# Patient Record
Sex: MALE | Race: WHITE | NOT HISPANIC OR LATINO | URBAN - METROPOLITAN AREA
[De-identification: names, ages, dates, MRNs, and addresses within clinical notes are randomized per-mention and may not be internally consistent; named-entity substitution may affect disease eponyms.]

---

## 2018-09-30 ENCOUNTER — EMERGENCY (EMERGENCY)
Facility: HOSPITAL | Age: 73
LOS: 1 days | Discharge: HOME | End: 2018-09-30
Attending: EMERGENCY MEDICINE
Payer: OTHER GOVERNMENT

## 2018-09-30 VITALS
HEIGHT: 67 IN | HEART RATE: 92 BPM | RESPIRATION RATE: 22 BRPM | WEIGHT: 274.92 LBS | TEMPERATURE: 98 F | DIASTOLIC BLOOD PRESSURE: 88 MMHG | OXYGEN SATURATION: 92 % | SYSTOLIC BLOOD PRESSURE: 156 MMHG

## 2018-09-30 VITALS
RESPIRATION RATE: 20 BRPM | DIASTOLIC BLOOD PRESSURE: 79 MMHG | HEART RATE: 102 BPM | SYSTOLIC BLOOD PRESSURE: 180 MMHG | OXYGEN SATURATION: 91 % | TEMPERATURE: 98 F

## 2018-09-30 LAB
ALBUMIN SERPL ELPH-MCNC: 3.8 G/DL — SIGNIFICANT CHANGE UP (ref 3.3–5.2)
ALP SERPL-CCNC: 46 U/L — SIGNIFICANT CHANGE UP (ref 40–120)
ALT FLD-CCNC: 17 U/L — SIGNIFICANT CHANGE UP
AMPHET UR-MCNC: NEGATIVE — SIGNIFICANT CHANGE UP
ANION GAP SERPL CALC-SCNC: 22 MMOL/L — HIGH (ref 5–17)
APPEARANCE UR: CLEAR — SIGNIFICANT CHANGE UP
AST SERPL-CCNC: 18 U/L — SIGNIFICANT CHANGE UP
BACTERIA # UR AUTO: ABNORMAL
BARBITURATES UR SCN-MCNC: NEGATIVE — SIGNIFICANT CHANGE UP
BENZODIAZ UR-MCNC: NEGATIVE — SIGNIFICANT CHANGE UP
BILIRUB SERPL-MCNC: 0.2 MG/DL — LOW (ref 0.4–2)
BILIRUB UR-MCNC: NEGATIVE — SIGNIFICANT CHANGE UP
BUN SERPL-MCNC: 19 MG/DL — SIGNIFICANT CHANGE UP (ref 8–20)
CALCIUM SERPL-MCNC: 9.6 MG/DL — SIGNIFICANT CHANGE UP (ref 8.6–10.2)
CHLORIDE SERPL-SCNC: 102 MMOL/L — SIGNIFICANT CHANGE UP (ref 98–107)
CK MB CFR SERPL CALC: 3.1 NG/ML — SIGNIFICANT CHANGE UP (ref 0–6.7)
CK SERPL-CCNC: 152 U/L — SIGNIFICANT CHANGE UP (ref 30–200)
CO2 SERPL-SCNC: 17 MMOL/L — LOW (ref 22–29)
COCAINE METAB.OTHER UR-MCNC: NEGATIVE — SIGNIFICANT CHANGE UP
COLOR SPEC: YELLOW — SIGNIFICANT CHANGE UP
CREAT SERPL-MCNC: 1.3 MG/DL — SIGNIFICANT CHANGE UP (ref 0.5–1.3)
DIFF PNL FLD: ABNORMAL
EPI CELLS # UR: SIGNIFICANT CHANGE UP
ETHANOL SERPL-MCNC: <10 MG/DL — SIGNIFICANT CHANGE UP
GLUCOSE SERPL-MCNC: 327 MG/DL — HIGH (ref 70–115)
GLUCOSE UR QL: 1000 MG/DL
HCT VFR BLD CALC: 40.8 % — LOW (ref 42–52)
HGB BLD-MCNC: 13.1 G/DL — LOW (ref 14–18)
HYALINE CASTS # UR AUTO: ABNORMAL /LPF
KETONES UR-MCNC: ABNORMAL
LEUKOCYTE ESTERASE UR-ACNC: NEGATIVE — SIGNIFICANT CHANGE UP
MCHC RBC-ENTMCNC: 30.8 PG — SIGNIFICANT CHANGE UP (ref 27–31)
MCHC RBC-ENTMCNC: 32.1 G/DL — SIGNIFICANT CHANGE UP (ref 32–36)
MCV RBC AUTO: 95.8 FL — HIGH (ref 80–94)
METHADONE UR-MCNC: NEGATIVE — SIGNIFICANT CHANGE UP
NITRITE UR-MCNC: NEGATIVE — SIGNIFICANT CHANGE UP
OPIATES UR-MCNC: NEGATIVE — SIGNIFICANT CHANGE UP
PCP SPEC-MCNC: SIGNIFICANT CHANGE UP
PCP UR-MCNC: NEGATIVE — SIGNIFICANT CHANGE UP
PH UR: 5 — SIGNIFICANT CHANGE UP (ref 5–8)
PLATELET # BLD AUTO: 212 K/UL — SIGNIFICANT CHANGE UP (ref 150–400)
POTASSIUM SERPL-MCNC: 4 MMOL/L — SIGNIFICANT CHANGE UP (ref 3.5–5.3)
POTASSIUM SERPL-SCNC: 4 MMOL/L — SIGNIFICANT CHANGE UP (ref 3.5–5.3)
PROT SERPL-MCNC: 7 G/DL — SIGNIFICANT CHANGE UP (ref 6.6–8.7)
PROT UR-MCNC: 100 MG/DL
RBC # BLD: 4.26 M/UL — LOW (ref 4.6–6.2)
RBC # FLD: 14.9 % — SIGNIFICANT CHANGE UP (ref 11–15.6)
RBC CASTS # UR COMP ASSIST: SIGNIFICANT CHANGE UP /HPF (ref 0–4)
SODIUM SERPL-SCNC: 141 MMOL/L — SIGNIFICANT CHANGE UP (ref 135–145)
SP GR SPEC: 1.02 — SIGNIFICANT CHANGE UP (ref 1.01–1.02)
THC UR QL: NEGATIVE — SIGNIFICANT CHANGE UP
TROPONIN T SERPL-MCNC: <0.01 NG/ML — SIGNIFICANT CHANGE UP (ref 0–0.06)
UROBILINOGEN FLD QL: NEGATIVE MG/DL — SIGNIFICANT CHANGE UP
WBC # BLD: 8.8 K/UL — SIGNIFICANT CHANGE UP (ref 4.8–10.8)
WBC # FLD AUTO: 8.8 K/UL — SIGNIFICANT CHANGE UP (ref 4.8–10.8)
WBC UR QL: SIGNIFICANT CHANGE UP

## 2018-09-30 PROCEDURE — 99284 EMERGENCY DEPT VISIT MOD MDM: CPT | Mod: 25

## 2018-09-30 PROCEDURE — 82553 CREATINE MB FRACTION: CPT

## 2018-09-30 PROCEDURE — 72131 CT LUMBAR SPINE W/O DYE: CPT | Mod: 26

## 2018-09-30 PROCEDURE — 80307 DRUG TEST PRSMV CHEM ANLYZR: CPT

## 2018-09-30 PROCEDURE — 81001 URINALYSIS AUTO W/SCOPE: CPT

## 2018-09-30 PROCEDURE — 80053 COMPREHEN METABOLIC PANEL: CPT

## 2018-09-30 PROCEDURE — 93005 ELECTROCARDIOGRAM TRACING: CPT

## 2018-09-30 PROCEDURE — 72131 CT LUMBAR SPINE W/O DYE: CPT

## 2018-09-30 PROCEDURE — 85027 COMPLETE CBC AUTOMATED: CPT

## 2018-09-30 PROCEDURE — 93010 ELECTROCARDIOGRAM REPORT: CPT

## 2018-09-30 PROCEDURE — 99053 MED SERV 10PM-8AM 24 HR FAC: CPT

## 2018-09-30 PROCEDURE — 36415 COLL VENOUS BLD VENIPUNCTURE: CPT

## 2018-09-30 PROCEDURE — 84484 ASSAY OF TROPONIN QUANT: CPT

## 2018-09-30 PROCEDURE — 82550 ASSAY OF CK (CPK): CPT

## 2018-09-30 NOTE — ED ADULT NURSE NOTE - OBJECTIVE STATEMENT
Pt states "I was on the toilet taking a leak when I slid off, I didn't not fall off, and I couldn't get back up because my legs felt weak", pt AOx4, denies hitting head or LOC, denies n/v, hx of DM, no obvious deformity/injury noted at this time, denies needing to use assistive devices at home, states "Edith been lazy lately and not doing as much, my daughter goes food shopping for me now, I just had a physical through the [facility] and they told me my blood work was good"

## 2018-09-30 NOTE — ED PROVIDER NOTE - OBJECTIVE STATEMENT
74 y/o M with hx of DM, HTN presents to the ED c/o weakness to lower extremities. Pt states he was in bathroom, as he went to stand up from toilet bowl his feet slipped and landed onto his lower back. Pt had difficulty standing back up as feet constantly slipped from underneath him. Pt  required assistance to stand up. PO intake normal today, medications taken. Pt states he consumed EtOH drinks today, no abnormal amount. Denies fevers/chills, ha, loc, focal neuro deficits, cp/sob/palp, cough, abd pain/n/v/d, urinary symptoms, recent travel and sick contacts. Pt lives alone.

## 2018-09-30 NOTE — ED ADULT NURSE NOTE - NSIMPLEMENTINTERV_GEN_ALL_ED
Implemented All Fall Risk Interventions:  [place] to call system. Call bell, personal items and telephone within reach. Instruct patient to call for assistance. Room bathroom lighting operational. Non-slip footwear when patient is off stretcher. Physically safe environment: no spills, clutter or unnecessary equipment. Stretcher in lowest position, wheels locked, appropriate side rails in place. Provide visual cue, wrist band, yellow gown, etc. Monitor gait and stability. Monitor for mental status changes and reorient to person, place, and time. Review medications for side effects contributing to fall risk. Reinforce activity limits and safety measures with patient and family.

## 2018-09-30 NOTE — ED PROVIDER NOTE - PHYSICAL EXAMINATION
Constitutional: Appears comfortably, talking in full sentences  Head: NC/AT, no swelling  Eyes: EOMI, no swelling  Mouth: mm moist  Neck: supple, trachea is midline  Chest: Bilateral air entry, symmetrical chest expansion, no distress  Heart: S1 S2 distant  Abdomen: abd soft, non tender  Musc/Skel: extremities with no swelling, no deformity, no spine tenderness, distal pulses present  Neuro: A&Ox3, no focal deficits sitting comfortably, talking in full sentences  pupilsa reactive, eomi,   mm moist, no oral injury, no facial pain  supple, no c/t/l spine tenderness, from, trachea in midline  Markie chest expansion, no cheat wall tenderness, no rib tenderness, no deformity, no clavicular pain  S1 S2 distant  abd soft, obese, non tender, no g/r,   no pelvic pain  moves all ext, trace edema b/l lower extremities  Neuro aao3, cn intact grossly, no focal deficits  skin no bruises, no swelling

## 2018-09-30 NOTE — ED ADULT TRIAGE NOTE - CHIEF COMPLAINT QUOTE
Pt c/o lower extremity weakness.  Pt states he was using the bathroom when he slipped off the toilet bowl on a wet floor.  Pt states he exhausted himself trying to get off the floor.  Denies cp.  Reports cardiac hx and DM.  No acute distress noted.

## 2018-09-30 NOTE — ED PROVIDER NOTE - NS ED ROS FT
no weight change, no fever or chills  no recent travel, no recent abox, no sick contacts  no recent change in medications  no rash, no bruises  no visual changes no eye discharge  no cough cold or congestion,   no sob, no chest pain  no orthopnea, no pnd  no abd pain, no n/v/d  no hematuria, no change in urinary habits  no joint pain, no deformity  no headache, no paresthesia +weakness to legs

## 2018-09-30 NOTE — ED PROVIDER NOTE - CONDUCTED A DETAILED DISCUSSION WITH PATIENT AND/OR GUARDIAN REGARDING, MDM
radiology results/lab results need for outpatient follow-up/return to ED if symptoms worsen, persist or questions arise/radiology results/lab results

## 2019-10-18 NOTE — ED ADULT NURSE REASSESSMENT NOTE - NS ED NURSE REASSESS COMMENT FT1
Pt able to ambulate safely and steadily w/out assistance, denies dizziness/weakness upon standing, IV removed, pt d/c home w/ family, ambulated to ED exit w/ assistance of wheelchair.
Pt ambulated well w/ use of wheelchair to restroom, pt tolerated well.
No

## 2020-01-01 ENCOUNTER — INPATIENT (INPATIENT)
Facility: HOSPITAL | Age: 75
LOS: 36 days | DRG: 207 | End: 2020-05-09
Attending: SURGERY | Admitting: FAMILY MEDICINE
Payer: MEDICARE

## 2020-01-01 VITALS
SYSTOLIC BLOOD PRESSURE: 135 MMHG | OXYGEN SATURATION: 85 % | HEIGHT: 66 IN | WEIGHT: 289.91 LBS | DIASTOLIC BLOOD PRESSURE: 84 MMHG | RESPIRATION RATE: 22 BRPM | TEMPERATURE: 100 F | HEART RATE: 65 BPM

## 2020-01-01 VITALS — TEMPERATURE: 99 F

## 2020-01-01 DIAGNOSIS — E87.1 HYPO-OSMOLALITY AND HYPONATREMIA: ICD-10-CM

## 2020-01-01 DIAGNOSIS — R55 SYNCOPE AND COLLAPSE: ICD-10-CM

## 2020-01-01 DIAGNOSIS — E11.65 TYPE 2 DIABETES MELLITUS WITH HYPERGLYCEMIA: ICD-10-CM

## 2020-01-01 DIAGNOSIS — N17.9 ACUTE KIDNEY FAILURE, UNSPECIFIED: ICD-10-CM

## 2020-01-01 DIAGNOSIS — B34.2 CORONAVIRUS INFECTION, UNSPECIFIED: ICD-10-CM

## 2020-01-01 DIAGNOSIS — I10 ESSENTIAL (PRIMARY) HYPERTENSION: ICD-10-CM

## 2020-01-01 DIAGNOSIS — E11.9 TYPE 2 DIABETES MELLITUS WITHOUT COMPLICATIONS: ICD-10-CM

## 2020-01-01 DIAGNOSIS — Z71.89 OTHER SPECIFIED COUNSELING: ICD-10-CM

## 2020-01-01 DIAGNOSIS — Z29.9 ENCOUNTER FOR PROPHYLACTIC MEASURES, UNSPECIFIED: ICD-10-CM

## 2020-01-01 DIAGNOSIS — R09.02 HYPOXEMIA: ICD-10-CM

## 2020-01-01 LAB
-  AMIKACIN: SIGNIFICANT CHANGE UP
-  AMIKACIN: SIGNIFICANT CHANGE UP
-  AMOXICILLIN/CLAVULANIC ACID: SIGNIFICANT CHANGE UP
-  AMOXICILLIN/CLAVULANIC ACID: SIGNIFICANT CHANGE UP
-  AMPICILLIN/SULBACTAM: SIGNIFICANT CHANGE UP
-  AMPICILLIN: SIGNIFICANT CHANGE UP
-  AMPICILLIN: SIGNIFICANT CHANGE UP
-  AZTREONAM: SIGNIFICANT CHANGE UP
-  AZTREONAM: SIGNIFICANT CHANGE UP
-  CANDIDA TROPICALIS: SIGNIFICANT CHANGE UP
-  CEFAZOLIN: SIGNIFICANT CHANGE UP
-  CEFEPIME: SIGNIFICANT CHANGE UP
-  CEFEPIME: SIGNIFICANT CHANGE UP
-  CEFOXITIN: SIGNIFICANT CHANGE UP
-  CEFOXITIN: SIGNIFICANT CHANGE UP
-  CEFTRIAXONE: SIGNIFICANT CHANGE UP
-  CEFTRIAXONE: SIGNIFICANT CHANGE UP
-  CIPROFLOXACIN: SIGNIFICANT CHANGE UP
-  CIPROFLOXACIN: SIGNIFICANT CHANGE UP
-  CLINDAMYCIN: SIGNIFICANT CHANGE UP
-  CLINDAMYCIN: SIGNIFICANT CHANGE UP
-  ERTAPENEM: SIGNIFICANT CHANGE UP
-  ERTAPENEM: SIGNIFICANT CHANGE UP
-  ERYTHROMYCIN: SIGNIFICANT CHANGE UP
-  ERYTHROMYCIN: SIGNIFICANT CHANGE UP
-  GENTAMICIN: SIGNIFICANT CHANGE UP
-  IMIPENEM: SIGNIFICANT CHANGE UP
-  IMIPENEM: SIGNIFICANT CHANGE UP
-  LEVOFLOXACIN: SIGNIFICANT CHANGE UP
-  LEVOFLOXACIN: SIGNIFICANT CHANGE UP
-  LINEZOLID: SIGNIFICANT CHANGE UP
-  LINEZOLID: SIGNIFICANT CHANGE UP
-  MEROPENEM: SIGNIFICANT CHANGE UP
-  MEROPENEM: SIGNIFICANT CHANGE UP
-  OXACILLIN: SIGNIFICANT CHANGE UP
-  OXACILLIN: SIGNIFICANT CHANGE UP
-  PENICILLIN: SIGNIFICANT CHANGE UP
-  PENICILLIN: SIGNIFICANT CHANGE UP
-  PIPERACILLIN/TAZOBACTAM: SIGNIFICANT CHANGE UP
-  PIPERACILLIN/TAZOBACTAM: SIGNIFICANT CHANGE UP
-  RIFAMPIN: SIGNIFICANT CHANGE UP
-  RIFAMPIN: SIGNIFICANT CHANGE UP
-  TETRACYCLINE: SIGNIFICANT CHANGE UP
-  TETRACYCLINE: SIGNIFICANT CHANGE UP
-  TOBRAMYCIN: SIGNIFICANT CHANGE UP
-  TOBRAMYCIN: SIGNIFICANT CHANGE UP
-  TRIMETHOPRIM/SULFAMETHOXAZOLE: SIGNIFICANT CHANGE UP
-  VANCOMYCIN: SIGNIFICANT CHANGE UP
-  VANCOMYCIN: SIGNIFICANT CHANGE UP
ABO RH CONFIRMATION: SIGNIFICANT CHANGE UP
ALBUMIN SERPL ELPH-MCNC: 1.9 G/DL — LOW (ref 3.3–5.2)
ALBUMIN SERPL ELPH-MCNC: 2 G/DL — LOW (ref 3.3–5.2)
ALBUMIN SERPL ELPH-MCNC: 2 G/DL — LOW (ref 3.3–5.2)
ALBUMIN SERPL ELPH-MCNC: 2.1 G/DL — LOW (ref 3.3–5.2)
ALBUMIN SERPL ELPH-MCNC: 2.3 G/DL — LOW (ref 3.3–5.2)
ALBUMIN SERPL ELPH-MCNC: 2.3 G/DL — LOW (ref 3.3–5.2)
ALBUMIN SERPL ELPH-MCNC: 2.5 G/DL — LOW (ref 3.3–5.2)
ALBUMIN SERPL ELPH-MCNC: 2.7 G/DL — LOW (ref 3.3–5.2)
ALBUMIN SERPL ELPH-MCNC: 2.7 G/DL — LOW (ref 3.3–5.2)
ALBUMIN SERPL ELPH-MCNC: 2.8 G/DL — LOW (ref 3.3–5.2)
ALBUMIN SERPL ELPH-MCNC: 3.1 G/DL — LOW (ref 3.3–5.2)
ALBUMIN SERPL ELPH-MCNC: 3.6 G/DL — SIGNIFICANT CHANGE UP (ref 3.3–5.2)
ALP SERPL-CCNC: 35 U/L — LOW (ref 40–120)
ALP SERPL-CCNC: 37 U/L — LOW (ref 40–120)
ALP SERPL-CCNC: 42 U/L — SIGNIFICANT CHANGE UP (ref 40–120)
ALP SERPL-CCNC: 43 U/L — SIGNIFICANT CHANGE UP (ref 40–120)
ALP SERPL-CCNC: 44 U/L — SIGNIFICANT CHANGE UP (ref 40–120)
ALP SERPL-CCNC: 45 U/L — SIGNIFICANT CHANGE UP (ref 40–120)
ALP SERPL-CCNC: 48 U/L — SIGNIFICANT CHANGE UP (ref 40–120)
ALP SERPL-CCNC: 51 U/L — SIGNIFICANT CHANGE UP (ref 40–120)
ALP SERPL-CCNC: 52 U/L — SIGNIFICANT CHANGE UP (ref 40–120)
ALP SERPL-CCNC: 53 U/L — SIGNIFICANT CHANGE UP (ref 40–120)
ALP SERPL-CCNC: 54 U/L — SIGNIFICANT CHANGE UP (ref 40–120)
ALP SERPL-CCNC: 55 U/L — SIGNIFICANT CHANGE UP (ref 40–120)
ALP SERPL-CCNC: 60 U/L — SIGNIFICANT CHANGE UP (ref 40–120)
ALP SERPL-CCNC: 62 U/L — SIGNIFICANT CHANGE UP (ref 40–120)
ALT FLD-CCNC: 18 U/L — SIGNIFICANT CHANGE UP
ALT FLD-CCNC: 19 U/L — SIGNIFICANT CHANGE UP
ALT FLD-CCNC: 19 U/L — SIGNIFICANT CHANGE UP
ALT FLD-CCNC: 21 U/L — SIGNIFICANT CHANGE UP
ALT FLD-CCNC: 23 U/L — SIGNIFICANT CHANGE UP
ALT FLD-CCNC: 24 U/L — SIGNIFICANT CHANGE UP
ALT FLD-CCNC: 27 U/L — SIGNIFICANT CHANGE UP
ALT FLD-CCNC: 30 U/L — SIGNIFICANT CHANGE UP
ALT FLD-CCNC: 30 U/L — SIGNIFICANT CHANGE UP
ALT FLD-CCNC: 32 U/L — SIGNIFICANT CHANGE UP
ALT FLD-CCNC: 33 U/L — SIGNIFICANT CHANGE UP
ALT FLD-CCNC: 40 U/L — SIGNIFICANT CHANGE UP
ALT FLD-CCNC: 48 U/L — HIGH
ALT FLD-CCNC: 53 U/L — HIGH
ANION GAP SERPL CALC-SCNC: 10 MMOL/L — SIGNIFICANT CHANGE UP (ref 5–17)
ANION GAP SERPL CALC-SCNC: 11 MMOL/L — SIGNIFICANT CHANGE UP (ref 5–17)
ANION GAP SERPL CALC-SCNC: 12 MMOL/L — SIGNIFICANT CHANGE UP (ref 5–17)
ANION GAP SERPL CALC-SCNC: 13 MMOL/L — SIGNIFICANT CHANGE UP (ref 5–17)
ANION GAP SERPL CALC-SCNC: 14 MMOL/L — SIGNIFICANT CHANGE UP (ref 5–17)
ANION GAP SERPL CALC-SCNC: 15 MMOL/L — SIGNIFICANT CHANGE UP (ref 5–17)
ANION GAP SERPL CALC-SCNC: 16 MMOL/L — SIGNIFICANT CHANGE UP (ref 5–17)
ANION GAP SERPL CALC-SCNC: 18 MMOL/L — HIGH (ref 5–17)
ANION GAP SERPL CALC-SCNC: 9 MMOL/L — SIGNIFICANT CHANGE UP (ref 5–17)
ANISOCYTOSIS BLD QL: SLIGHT — SIGNIFICANT CHANGE UP
APPEARANCE UR: CLEAR — SIGNIFICANT CHANGE UP
APTT BLD: 109.5 SEC — HIGH (ref 27.5–36.3)
APTT BLD: 111.6 SEC — HIGH (ref 27.5–36.3)
APTT BLD: 122.5 SEC — CRITICAL HIGH (ref 27.5–36.3)
APTT BLD: 123.7 SEC — CRITICAL HIGH (ref 27.5–36.3)
APTT BLD: 124.1 SEC — CRITICAL HIGH (ref 27.5–36.3)
APTT BLD: 25.4 SEC — LOW (ref 27.5–36.3)
APTT BLD: 26.4 SEC — LOW (ref 27.5–36.3)
APTT BLD: 27.4 SEC — LOW (ref 27.5–36.3)
APTT BLD: 38.5 SEC — HIGH (ref 27.5–36.3)
APTT BLD: 48.8 SEC — HIGH (ref 27.5–36.3)
APTT BLD: 52.1 SEC — HIGH (ref 27.5–36.3)
APTT BLD: 54.3 SEC — HIGH (ref 27.5–36.3)
APTT BLD: 56.1 SEC — HIGH (ref 27.5–36.3)
APTT BLD: 56.3 SEC — HIGH (ref 27.5–36.3)
APTT BLD: 57.8 SEC — HIGH (ref 27.5–36.3)
APTT BLD: 57.8 SEC — HIGH (ref 27.5–36.3)
APTT BLD: 61 SEC — HIGH (ref 27.5–36.3)
APTT BLD: 66 SEC — HIGH (ref 27.5–36.3)
APTT BLD: 66.8 SEC — HIGH (ref 27.5–36.3)
APTT BLD: 68.2 SEC — HIGH (ref 27.5–36.3)
APTT BLD: 68.3 SEC — HIGH (ref 27.5–36.3)
APTT BLD: 68.8 SEC — HIGH (ref 27.5–36.3)
APTT BLD: 68.9 SEC — HIGH (ref 27.5–36.3)
APTT BLD: 69.9 SEC — HIGH (ref 27.5–36.3)
APTT BLD: 70.2 SEC — HIGH (ref 27.5–36.3)
APTT BLD: 74.8 SEC — HIGH (ref 27.5–36.3)
APTT BLD: 75 SEC — HIGH (ref 27.5–36.3)
APTT BLD: 75.3 SEC — HIGH (ref 27.5–36.3)
APTT BLD: 79.3 SEC — HIGH (ref 27.5–36.3)
APTT BLD: 80 SEC — HIGH (ref 27.5–36.3)
APTT BLD: 81.9 SEC — HIGH (ref 27.5–36.3)
APTT BLD: 83.5 SEC — HIGH (ref 27.5–36.3)
APTT BLD: 83.5 SEC — HIGH (ref 27.5–36.3)
APTT BLD: 83.7 SEC — HIGH (ref 27.5–36.3)
APTT BLD: 85.4 SEC — HIGH (ref 27.5–36.3)
APTT BLD: 85.5 SEC — HIGH (ref 27.5–36.3)
APTT BLD: 86.7 SEC — HIGH (ref 27.5–36.3)
APTT BLD: 88.2 SEC — HIGH (ref 27.5–36.3)
APTT BLD: 88.4 SEC — HIGH (ref 27.5–36.3)
APTT BLD: 91.2 SEC — HIGH (ref 27.5–36.3)
APTT BLD: 96.4 SEC — HIGH (ref 27.5–36.3)
APTT BLD: 97.2 SEC — HIGH (ref 27.5–36.3)
APTT BLD: 97.8 SEC — HIGH (ref 27.5–36.3)
APTT BLD: 98.4 SEC — HIGH (ref 27.5–36.3)
APTT BLD: 99.4 SEC — HIGH (ref 27.5–36.3)
APTT BLD: >200 SEC — CRITICAL HIGH (ref 27.5–36.3)
AST SERPL-CCNC: 142 U/L — HIGH
AST SERPL-CCNC: 16 U/L — SIGNIFICANT CHANGE UP
AST SERPL-CCNC: 19 U/L — SIGNIFICANT CHANGE UP
AST SERPL-CCNC: 21 U/L — SIGNIFICANT CHANGE UP
AST SERPL-CCNC: 22 U/L — SIGNIFICANT CHANGE UP
AST SERPL-CCNC: 22 U/L — SIGNIFICANT CHANGE UP
AST SERPL-CCNC: 23 U/L — SIGNIFICANT CHANGE UP
AST SERPL-CCNC: 23 U/L — SIGNIFICANT CHANGE UP
AST SERPL-CCNC: 29 U/L — SIGNIFICANT CHANGE UP
AST SERPL-CCNC: 32 U/L — SIGNIFICANT CHANGE UP
AST SERPL-CCNC: 32 U/L — SIGNIFICANT CHANGE UP
AST SERPL-CCNC: 36 U/L — SIGNIFICANT CHANGE UP
AST SERPL-CCNC: 55 U/L — HIGH
AST SERPL-CCNC: 62 U/L — HIGH
BACTERIA # UR AUTO: ABNORMAL
BASE EXCESS BLDA CALC-SCNC: -0.6 MMOL/L — SIGNIFICANT CHANGE UP (ref -3–3)
BASE EXCESS BLDA CALC-SCNC: -0.9 MMOL/L — SIGNIFICANT CHANGE UP (ref -3–3)
BASE EXCESS BLDA CALC-SCNC: 6.1 MMOL/L — HIGH (ref -2–2)
BASE EXCESS BLDA CALC-SCNC: 7.1 MMOL/L — HIGH (ref -2–2)
BASO STIPL BLD QL SMEAR: PRESENT — SIGNIFICANT CHANGE UP
BASOPHILS # BLD AUTO: 0 K/UL — SIGNIFICANT CHANGE UP (ref 0–0.2)
BASOPHILS # BLD AUTO: 0.01 K/UL — SIGNIFICANT CHANGE UP (ref 0–0.2)
BASOPHILS # BLD AUTO: 0.02 K/UL — SIGNIFICANT CHANGE UP (ref 0–0.2)
BASOPHILS # BLD AUTO: 0.03 K/UL — SIGNIFICANT CHANGE UP (ref 0–0.2)
BASOPHILS # BLD AUTO: 0.04 K/UL — SIGNIFICANT CHANGE UP (ref 0–0.2)
BASOPHILS # BLD AUTO: 0.05 K/UL — SIGNIFICANT CHANGE UP (ref 0–0.2)
BASOPHILS # BLD AUTO: 0.06 K/UL — SIGNIFICANT CHANGE UP (ref 0–0.2)
BASOPHILS NFR BLD AUTO: 0 % — SIGNIFICANT CHANGE UP (ref 0–2)
BASOPHILS NFR BLD AUTO: 0.1 % — SIGNIFICANT CHANGE UP (ref 0–2)
BASOPHILS NFR BLD AUTO: 0.2 % — SIGNIFICANT CHANGE UP (ref 0–2)
BASOPHILS NFR BLD AUTO: 0.3 % — SIGNIFICANT CHANGE UP (ref 0–2)
BASOPHILS NFR BLD AUTO: 0.4 % — SIGNIFICANT CHANGE UP (ref 0–2)
BASOPHILS NFR BLD AUTO: 0.5 % — SIGNIFICANT CHANGE UP (ref 0–2)
BASOPHILS NFR BLD AUTO: 0.6 % — SIGNIFICANT CHANGE UP (ref 0–2)
BASOPHILS NFR BLD AUTO: 0.6 % — SIGNIFICANT CHANGE UP (ref 0–2)
BASOPHILS NFR BLD AUTO: 0.7 % — SIGNIFICANT CHANGE UP (ref 0–2)
BASOPHILS NFR BLD AUTO: 0.9 % — SIGNIFICANT CHANGE UP (ref 0–2)
BASOPHILS NFR BLD AUTO: 1.6 % — SIGNIFICANT CHANGE UP (ref 0–2)
BILIRUB SERPL-MCNC: 0.3 MG/DL — LOW (ref 0.4–2)
BILIRUB SERPL-MCNC: 0.4 MG/DL — SIGNIFICANT CHANGE UP (ref 0.4–2)
BILIRUB SERPL-MCNC: 0.4 MG/DL — SIGNIFICANT CHANGE UP (ref 0.4–2)
BILIRUB SERPL-MCNC: 0.6 MG/DL — SIGNIFICANT CHANGE UP (ref 0.4–2)
BILIRUB SERPL-MCNC: <0.2 MG/DL — LOW (ref 0.4–2)
BILIRUB SERPL-MCNC: <0.2 MG/DL — LOW (ref 0.4–2)
BILIRUB UR-MCNC: NEGATIVE — SIGNIFICANT CHANGE UP
BLD GP AB SCN SERPL QL: SIGNIFICANT CHANGE UP
BLD GP AB SCN SERPL QL: SIGNIFICANT CHANGE UP
BLOOD GAS COMMENTS ARTERIAL: SIGNIFICANT CHANGE UP
BUN SERPL-MCNC: 21 MG/DL — HIGH (ref 8–20)
BUN SERPL-MCNC: 22 MG/DL — HIGH (ref 8–20)
BUN SERPL-MCNC: 25 MG/DL — HIGH (ref 8–20)
BUN SERPL-MCNC: 26 MG/DL — HIGH (ref 8–20)
BUN SERPL-MCNC: 29 MG/DL — HIGH (ref 8–20)
BUN SERPL-MCNC: 29 MG/DL — HIGH (ref 8–20)
BUN SERPL-MCNC: 31 MG/DL — HIGH (ref 8–20)
BUN SERPL-MCNC: 33 MG/DL — HIGH (ref 8–20)
BUN SERPL-MCNC: 33 MG/DL — HIGH (ref 8–20)
BUN SERPL-MCNC: 35 MG/DL — HIGH (ref 8–20)
BUN SERPL-MCNC: 35 MG/DL — HIGH (ref 8–20)
BUN SERPL-MCNC: 36 MG/DL — HIGH (ref 8–20)
BUN SERPL-MCNC: 37 MG/DL — HIGH (ref 8–20)
BUN SERPL-MCNC: 38 MG/DL — HIGH (ref 8–20)
BUN SERPL-MCNC: 40 MG/DL — HIGH (ref 8–20)
BUN SERPL-MCNC: 41 MG/DL — HIGH (ref 8–20)
BUN SERPL-MCNC: 43 MG/DL — HIGH (ref 8–20)
BUN SERPL-MCNC: 43 MG/DL — HIGH (ref 8–20)
BUN SERPL-MCNC: 44 MG/DL — HIGH (ref 8–20)
BUN SERPL-MCNC: 44 MG/DL — HIGH (ref 8–20)
BUN SERPL-MCNC: 45 MG/DL — HIGH (ref 8–20)
BUN SERPL-MCNC: 45 MG/DL — HIGH (ref 8–20)
BUN SERPL-MCNC: 49 MG/DL — HIGH (ref 8–20)
BUN SERPL-MCNC: 50 MG/DL — HIGH (ref 8–20)
BUN SERPL-MCNC: 50 MG/DL — HIGH (ref 8–20)
BUN SERPL-MCNC: 52 MG/DL — HIGH (ref 8–20)
BUN SERPL-MCNC: 53 MG/DL — HIGH (ref 8–20)
BUN SERPL-MCNC: 53 MG/DL — HIGH (ref 8–20)
BUN SERPL-MCNC: 55 MG/DL — HIGH (ref 8–20)
BUN SERPL-MCNC: 57 MG/DL — HIGH (ref 8–20)
BUN SERPL-MCNC: 58 MG/DL — HIGH (ref 8–20)
BUN SERPL-MCNC: 59 MG/DL — HIGH (ref 8–20)
BUN SERPL-MCNC: 60 MG/DL — HIGH (ref 8–20)
BUN SERPL-MCNC: 60 MG/DL — HIGH (ref 8–20)
BUN SERPL-MCNC: 64 MG/DL — HIGH (ref 8–20)
BUN SERPL-MCNC: 65 MG/DL — HIGH (ref 8–20)
BUN SERPL-MCNC: 73 MG/DL — HIGH (ref 8–20)
BUN SERPL-MCNC: 73 MG/DL — HIGH (ref 8–20)
BUN SERPL-MCNC: 79 MG/DL — HIGH (ref 8–20)
BUN SERPL-MCNC: 80 MG/DL — HIGH (ref 8–20)
BUN SERPL-MCNC: 80 MG/DL — HIGH (ref 8–20)
BUN SERPL-MCNC: 85 MG/DL — HIGH (ref 8–20)
BUN SERPL-MCNC: 85 MG/DL — HIGH (ref 8–20)
BUN SERPL-MCNC: 86 MG/DL — HIGH (ref 8–20)
BUN SERPL-MCNC: 88 MG/DL — HIGH (ref 8–20)
BUN SERPL-MCNC: 88 MG/DL — HIGH (ref 8–20)
CALCIUM SERPL-MCNC: 7.6 MG/DL — LOW (ref 8.6–10.2)
CALCIUM SERPL-MCNC: 7.7 MG/DL — LOW (ref 8.6–10.2)
CALCIUM SERPL-MCNC: 7.8 MG/DL — LOW (ref 8.6–10.2)
CALCIUM SERPL-MCNC: 7.8 MG/DL — LOW (ref 8.6–10.2)
CALCIUM SERPL-MCNC: 7.9 MG/DL — LOW (ref 8.6–10.2)
CALCIUM SERPL-MCNC: 8 MG/DL — LOW (ref 8.6–10.2)
CALCIUM SERPL-MCNC: 8.1 MG/DL — LOW (ref 8.6–10.2)
CALCIUM SERPL-MCNC: 8.2 MG/DL — LOW (ref 8.6–10.2)
CALCIUM SERPL-MCNC: 8.3 MG/DL — LOW (ref 8.6–10.2)
CALCIUM SERPL-MCNC: 8.4 MG/DL — LOW (ref 8.6–10.2)
CALCIUM SERPL-MCNC: 8.4 MG/DL — LOW (ref 8.6–10.2)
CALCIUM SERPL-MCNC: 8.5 MG/DL — LOW (ref 8.6–10.2)
CALCIUM SERPL-MCNC: 8.6 MG/DL — SIGNIFICANT CHANGE UP (ref 8.6–10.2)
CALCIUM SERPL-MCNC: 8.8 MG/DL — SIGNIFICANT CHANGE UP (ref 8.6–10.2)
CALCIUM SERPL-MCNC: 8.8 MG/DL — SIGNIFICANT CHANGE UP (ref 8.6–10.2)
CALCIUM SERPL-MCNC: 8.9 MG/DL — SIGNIFICANT CHANGE UP (ref 8.6–10.2)
CALCIUM SERPL-MCNC: 8.9 MG/DL — SIGNIFICANT CHANGE UP (ref 8.6–10.2)
CD3-/CD16+CD56+(%): 5 % — SIGNIFICANT CHANGE UP (ref 4–25)
CD3-CD16+CD56+(ABS): 46 CELLS/UL — LOW (ref 70–760)
CHLORIDE SERPL-SCNC: 100 MMOL/L — SIGNIFICANT CHANGE UP (ref 98–107)
CHLORIDE SERPL-SCNC: 100 MMOL/L — SIGNIFICANT CHANGE UP (ref 98–107)
CHLORIDE SERPL-SCNC: 101 MMOL/L — SIGNIFICANT CHANGE UP (ref 98–107)
CHLORIDE SERPL-SCNC: 102 MMOL/L — SIGNIFICANT CHANGE UP (ref 98–107)
CHLORIDE SERPL-SCNC: 103 MMOL/L — SIGNIFICANT CHANGE UP (ref 98–107)
CHLORIDE SERPL-SCNC: 103 MMOL/L — SIGNIFICANT CHANGE UP (ref 98–107)
CHLORIDE SERPL-SCNC: 105 MMOL/L — SIGNIFICANT CHANGE UP (ref 98–107)
CHLORIDE SERPL-SCNC: 105 MMOL/L — SIGNIFICANT CHANGE UP (ref 98–107)
CHLORIDE SERPL-SCNC: 106 MMOL/L — SIGNIFICANT CHANGE UP (ref 98–107)
CHLORIDE SERPL-SCNC: 106 MMOL/L — SIGNIFICANT CHANGE UP (ref 98–107)
CHLORIDE SERPL-SCNC: 107 MMOL/L — SIGNIFICANT CHANGE UP (ref 98–107)
CHLORIDE SERPL-SCNC: 107 MMOL/L — SIGNIFICANT CHANGE UP (ref 98–107)
CHLORIDE SERPL-SCNC: 110 MMOL/L — HIGH (ref 98–107)
CHLORIDE SERPL-SCNC: 110 MMOL/L — HIGH (ref 98–107)
CHLORIDE SERPL-SCNC: 111 MMOL/L — HIGH (ref 98–107)
CHLORIDE SERPL-SCNC: 112 MMOL/L — HIGH (ref 98–107)
CHLORIDE SERPL-SCNC: 114 MMOL/L — HIGH (ref 98–107)
CHLORIDE SERPL-SCNC: 115 MMOL/L — HIGH (ref 98–107)
CHLORIDE SERPL-SCNC: 116 MMOL/L — HIGH (ref 98–107)
CHLORIDE SERPL-SCNC: 117 MMOL/L — HIGH (ref 98–107)
CHLORIDE SERPL-SCNC: 117 MMOL/L — HIGH (ref 98–107)
CHLORIDE SERPL-SCNC: 118 MMOL/L — HIGH (ref 98–107)
CHLORIDE SERPL-SCNC: 119 MMOL/L — HIGH (ref 98–107)
CHLORIDE SERPL-SCNC: 120 MMOL/L — HIGH (ref 98–107)
CHLORIDE SERPL-SCNC: 121 MMOL/L — HIGH (ref 98–107)
CHLORIDE SERPL-SCNC: 89 MMOL/L — LOW (ref 98–107)
CHLORIDE SERPL-SCNC: 92 MMOL/L — LOW (ref 98–107)
CHLORIDE SERPL-SCNC: 93 MMOL/L — LOW (ref 98–107)
CHLORIDE SERPL-SCNC: 94 MMOL/L — LOW (ref 98–107)
CHLORIDE SERPL-SCNC: 95 MMOL/L — LOW (ref 98–107)
CHLORIDE SERPL-SCNC: 96 MMOL/L — LOW (ref 98–107)
CHLORIDE SERPL-SCNC: 98 MMOL/L — SIGNIFICANT CHANGE UP (ref 98–107)
CHLORIDE SERPL-SCNC: 99 MMOL/L — SIGNIFICANT CHANGE UP (ref 98–107)
CHLORIDE SERPL-SCNC: 99 MMOL/L — SIGNIFICANT CHANGE UP (ref 98–107)
CHOLEST SERPL-MCNC: 80 MG/DL — LOW (ref 110–199)
CHOLEST SERPL-MCNC: 94 MG/DL — LOW (ref 110–199)
CK MB CFR SERPL CALC: 2.9 NG/ML — SIGNIFICANT CHANGE UP (ref 0–6.7)
CK MB CFR SERPL CALC: 6.4 NG/ML — SIGNIFICANT CHANGE UP (ref 0–6.7)
CK MB CFR SERPL CALC: <1 NG/ML — SIGNIFICANT CHANGE UP (ref 0–6.7)
CK SERPL-CCNC: 383 U/L — HIGH (ref 30–200)
CK SERPL-CCNC: 397 U/L — HIGH (ref 30–200)
CK SERPL-CCNC: 577 U/L — HIGH (ref 30–200)
CK SERPL-CCNC: 75 U/L — SIGNIFICANT CHANGE UP (ref 30–200)
CO2 SERPL-SCNC: 20 MMOL/L — LOW (ref 22–29)
CO2 SERPL-SCNC: 21 MMOL/L — LOW (ref 22–29)
CO2 SERPL-SCNC: 22 MMOL/L — SIGNIFICANT CHANGE UP (ref 22–29)
CO2 SERPL-SCNC: 23 MMOL/L — SIGNIFICANT CHANGE UP (ref 22–29)
CO2 SERPL-SCNC: 24 MMOL/L — SIGNIFICANT CHANGE UP (ref 22–29)
CO2 SERPL-SCNC: 24 MMOL/L — SIGNIFICANT CHANGE UP (ref 22–29)
CO2 SERPL-SCNC: 25 MMOL/L — SIGNIFICANT CHANGE UP (ref 22–29)
CO2 SERPL-SCNC: 26 MMOL/L — SIGNIFICANT CHANGE UP (ref 22–29)
CO2 SERPL-SCNC: 27 MMOL/L — SIGNIFICANT CHANGE UP (ref 22–29)
CO2 SERPL-SCNC: 28 MMOL/L — SIGNIFICANT CHANGE UP (ref 22–29)
CO2 SERPL-SCNC: 29 MMOL/L — SIGNIFICANT CHANGE UP (ref 22–29)
CO2 SERPL-SCNC: 29 MMOL/L — SIGNIFICANT CHANGE UP (ref 22–29)
CO2 SERPL-SCNC: 30 MMOL/L — HIGH (ref 22–29)
CO2 SERPL-SCNC: 31 MMOL/L — HIGH (ref 22–29)
COLOR SPEC: YELLOW — SIGNIFICANT CHANGE UP
CREAT SERPL-MCNC: 0.77 MG/DL — SIGNIFICANT CHANGE UP (ref 0.5–1.3)
CREAT SERPL-MCNC: 0.8 MG/DL — SIGNIFICANT CHANGE UP (ref 0.5–1.3)
CREAT SERPL-MCNC: 0.88 MG/DL — SIGNIFICANT CHANGE UP (ref 0.5–1.3)
CREAT SERPL-MCNC: 0.89 MG/DL — SIGNIFICANT CHANGE UP (ref 0.5–1.3)
CREAT SERPL-MCNC: 0.92 MG/DL — SIGNIFICANT CHANGE UP (ref 0.5–1.3)
CREAT SERPL-MCNC: 0.93 MG/DL — SIGNIFICANT CHANGE UP (ref 0.5–1.3)
CREAT SERPL-MCNC: 1.03 MG/DL — SIGNIFICANT CHANGE UP (ref 0.5–1.3)
CREAT SERPL-MCNC: 1.12 MG/DL — SIGNIFICANT CHANGE UP (ref 0.5–1.3)
CREAT SERPL-MCNC: 1.21 MG/DL — SIGNIFICANT CHANGE UP (ref 0.5–1.3)
CREAT SERPL-MCNC: 1.23 MG/DL — SIGNIFICANT CHANGE UP (ref 0.5–1.3)
CREAT SERPL-MCNC: 1.25 MG/DL — SIGNIFICANT CHANGE UP (ref 0.5–1.3)
CREAT SERPL-MCNC: 1.26 MG/DL — SIGNIFICANT CHANGE UP (ref 0.5–1.3)
CREAT SERPL-MCNC: 1.26 MG/DL — SIGNIFICANT CHANGE UP (ref 0.5–1.3)
CREAT SERPL-MCNC: 1.28 MG/DL — SIGNIFICANT CHANGE UP (ref 0.5–1.3)
CREAT SERPL-MCNC: 1.38 MG/DL — HIGH (ref 0.5–1.3)
CREAT SERPL-MCNC: 1.39 MG/DL — HIGH (ref 0.5–1.3)
CREAT SERPL-MCNC: 1.42 MG/DL — HIGH (ref 0.5–1.3)
CREAT SERPL-MCNC: 1.44 MG/DL — HIGH (ref 0.5–1.3)
CREAT SERPL-MCNC: 1.49 MG/DL — HIGH (ref 0.5–1.3)
CREAT SERPL-MCNC: 1.61 MG/DL — HIGH (ref 0.5–1.3)
CREAT SERPL-MCNC: 1.63 MG/DL — HIGH (ref 0.5–1.3)
CREAT SERPL-MCNC: 1.67 MG/DL — HIGH (ref 0.5–1.3)
CREAT SERPL-MCNC: 1.67 MG/DL — HIGH (ref 0.5–1.3)
CREAT SERPL-MCNC: 1.7 MG/DL — HIGH (ref 0.5–1.3)
CREAT SERPL-MCNC: 1.7 MG/DL — HIGH (ref 0.5–1.3)
CREAT SERPL-MCNC: 1.71 MG/DL — HIGH (ref 0.5–1.3)
CREAT SERPL-MCNC: 1.72 MG/DL — HIGH (ref 0.5–1.3)
CREAT SERPL-MCNC: 1.73 MG/DL — HIGH (ref 0.5–1.3)
CREAT SERPL-MCNC: 1.73 MG/DL — HIGH (ref 0.5–1.3)
CREAT SERPL-MCNC: 1.74 MG/DL — HIGH (ref 0.5–1.3)
CREAT SERPL-MCNC: 1.76 MG/DL — HIGH (ref 0.5–1.3)
CREAT SERPL-MCNC: 1.76 MG/DL — HIGH (ref 0.5–1.3)
CREAT SERPL-MCNC: 1.77 MG/DL — HIGH (ref 0.5–1.3)
CREAT SERPL-MCNC: 1.78 MG/DL — HIGH (ref 0.5–1.3)
CREAT SERPL-MCNC: 1.78 MG/DL — HIGH (ref 0.5–1.3)
CREAT SERPL-MCNC: 1.79 MG/DL — HIGH (ref 0.5–1.3)
CREAT SERPL-MCNC: 1.79 MG/DL — HIGH (ref 0.5–1.3)
CREAT SERPL-MCNC: 1.8 MG/DL — HIGH (ref 0.5–1.3)
CREAT SERPL-MCNC: 1.82 MG/DL — HIGH (ref 0.5–1.3)
CREAT SERPL-MCNC: 1.83 MG/DL — HIGH (ref 0.5–1.3)
CREAT SERPL-MCNC: 1.87 MG/DL — HIGH (ref 0.5–1.3)
CREAT SERPL-MCNC: 1.88 MG/DL — HIGH (ref 0.5–1.3)
CREAT SERPL-MCNC: 1.88 MG/DL — HIGH (ref 0.5–1.3)
CREAT SERPL-MCNC: 1.97 MG/DL — HIGH (ref 0.5–1.3)
CREAT SERPL-MCNC: 2 MG/DL — HIGH (ref 0.5–1.3)
CREAT SERPL-MCNC: 2.01 MG/DL — HIGH (ref 0.5–1.3)
CRP SERPL-MCNC: 23.64 MG/DL — HIGH (ref 0–0.4)
CRP SERPL-MCNC: 6.89 MG/DL — HIGH (ref 0–0.4)
CRP SERPL-MCNC: 7.27 MG/DL — HIGH (ref 0–0.4)
CRP SERPL-MCNC: 7.79 MG/DL — HIGH (ref 0–0.4)
CRP SERPL-MCNC: 8.55 MG/DL — HIGH (ref 0–0.4)
CRP SERPL-MCNC: 9.96 MG/DL — HIGH (ref 0–0.4)
CULTURE RESULTS: SIGNIFICANT CHANGE UP
D DIMER BLD IA.RAPID-MCNC: 349 NG/ML DDU — HIGH
D DIMER BLD IA.RAPID-MCNC: 368 NG/ML DDU — HIGH
D DIMER BLD IA.RAPID-MCNC: 509 NG/ML DDU — HIGH
D DIMER BLD IA.RAPID-MCNC: 521 NG/ML DDU — HIGH
D DIMER BLD IA.RAPID-MCNC: 541 NG/ML DDU — HIGH
D DIMER BLD IA.RAPID-MCNC: 554 NG/ML DDU — HIGH
D DIMER BLD IA.RAPID-MCNC: 573 NG/ML DDU — HIGH
D DIMER BLD IA.RAPID-MCNC: 786 NG/ML DDU — HIGH
D DIMER BLD IA.RAPID-MCNC: 840 NG/ML DDU — HIGH
DACRYOCYTES BLD QL SMEAR: SLIGHT — SIGNIFICANT CHANGE UP
DIFF PNL FLD: ABNORMAL
ELLIPTOCYTES BLD QL SMEAR: SLIGHT — SIGNIFICANT CHANGE UP
EOSINOPHIL # BLD AUTO: 0 K/UL — SIGNIFICANT CHANGE UP (ref 0–0.5)
EOSINOPHIL # BLD AUTO: 0.01 K/UL — SIGNIFICANT CHANGE UP (ref 0–0.5)
EOSINOPHIL # BLD AUTO: 0.02 K/UL — SIGNIFICANT CHANGE UP (ref 0–0.5)
EOSINOPHIL # BLD AUTO: 0.03 K/UL — SIGNIFICANT CHANGE UP (ref 0–0.5)
EOSINOPHIL # BLD AUTO: 0.04 K/UL — SIGNIFICANT CHANGE UP (ref 0–0.5)
EOSINOPHIL # BLD AUTO: 0.06 K/UL — SIGNIFICANT CHANGE UP (ref 0–0.5)
EOSINOPHIL # BLD AUTO: 0.06 K/UL — SIGNIFICANT CHANGE UP (ref 0–0.5)
EOSINOPHIL # BLD AUTO: 0.07 K/UL — SIGNIFICANT CHANGE UP (ref 0–0.5)
EOSINOPHIL NFR BLD AUTO: 0 % — SIGNIFICANT CHANGE UP (ref 0–6)
EOSINOPHIL NFR BLD AUTO: 0.1 % — SIGNIFICANT CHANGE UP (ref 0–6)
EOSINOPHIL NFR BLD AUTO: 0.2 % — SIGNIFICANT CHANGE UP (ref 0–6)
EOSINOPHIL NFR BLD AUTO: 0.3 % — SIGNIFICANT CHANGE UP (ref 0–6)
EOSINOPHIL NFR BLD AUTO: 0.4 % — SIGNIFICANT CHANGE UP (ref 0–6)
EOSINOPHIL NFR BLD AUTO: 0.7 % — SIGNIFICANT CHANGE UP (ref 0–6)
EOSINOPHIL NFR BLD AUTO: 0.8 % — SIGNIFICANT CHANGE UP (ref 0–6)
EOSINOPHIL NFR BLD AUTO: 0.9 % — SIGNIFICANT CHANGE UP (ref 0–6)
EOSINOPHIL NFR BLD AUTO: 1.1 % — SIGNIFICANT CHANGE UP (ref 0–6)
EOSINOPHIL NFR BLD AUTO: 1.2 % — SIGNIFICANT CHANGE UP (ref 0–6)
EOSINOPHIL NFR BLD AUTO: 1.6 % — SIGNIFICANT CHANGE UP (ref 0–6)
EPI CELLS # UR: NEGATIVE — SIGNIFICANT CHANGE UP
ERYTHROCYTE [SEDIMENTATION RATE] IN BLOOD: 42 MM/HR — HIGH (ref 0–20)
ERYTHROCYTE [SEDIMENTATION RATE] IN BLOOD: 49 MM/HR — HIGH (ref 0–20)
ERYTHROCYTE [SEDIMENTATION RATE] IN BLOOD: 55 MM/HR — HIGH (ref 0–20)
ERYTHROCYTE [SEDIMENTATION RATE] IN BLOOD: 64 MM/HR — HIGH (ref 0–20)
ERYTHROCYTE [SEDIMENTATION RATE] IN BLOOD: 70 MM/HR — HIGH (ref 0–20)
FERRITIN SERPL-MCNC: 398 NG/ML — SIGNIFICANT CHANGE UP (ref 30–400)
FERRITIN SERPL-MCNC: 400 NG/ML — SIGNIFICANT CHANGE UP (ref 30–400)
FERRITIN SERPL-MCNC: 441 NG/ML — HIGH (ref 30–400)
FERRITIN SERPL-MCNC: 462 NG/ML — HIGH (ref 30–400)
FERRITIN SERPL-MCNC: 764 NG/ML — HIGH (ref 30–400)
FERRITIN SERPL-MCNC: 793 NG/ML — HIGH (ref 30–400)
FIBRINOGEN PPP-MCNC: 391 MG/DL — SIGNIFICANT CHANGE UP (ref 300–520)
FIBRINOGEN PPP-MCNC: 525 MG/DL — HIGH (ref 300–520)
FIBRINOGEN PPP-MCNC: 687 MG/DL — HIGH (ref 300–520)
GAS PNL BLDA: SIGNIFICANT CHANGE UP
GAS PNL BLDV: SIGNIFICANT CHANGE UP
GIANT PLATELETS BLD QL SMEAR: PRESENT — SIGNIFICANT CHANGE UP
GLUCOSE BLDC GLUCOMTR-MCNC: 100 MG/DL — HIGH (ref 70–99)
GLUCOSE BLDC GLUCOMTR-MCNC: 101 MG/DL — HIGH (ref 70–99)
GLUCOSE BLDC GLUCOMTR-MCNC: 103 MG/DL — HIGH (ref 70–99)
GLUCOSE BLDC GLUCOMTR-MCNC: 105 MG/DL — HIGH (ref 70–99)
GLUCOSE BLDC GLUCOMTR-MCNC: 107 MG/DL — HIGH (ref 70–99)
GLUCOSE BLDC GLUCOMTR-MCNC: 108 MG/DL — HIGH (ref 70–99)
GLUCOSE BLDC GLUCOMTR-MCNC: 108 MG/DL — HIGH (ref 70–99)
GLUCOSE BLDC GLUCOMTR-MCNC: 111 MG/DL — HIGH (ref 70–99)
GLUCOSE BLDC GLUCOMTR-MCNC: 111 MG/DL — HIGH (ref 70–99)
GLUCOSE BLDC GLUCOMTR-MCNC: 112 MG/DL — HIGH (ref 70–99)
GLUCOSE BLDC GLUCOMTR-MCNC: 113 MG/DL — HIGH (ref 70–99)
GLUCOSE BLDC GLUCOMTR-MCNC: 113 MG/DL — HIGH (ref 70–99)
GLUCOSE BLDC GLUCOMTR-MCNC: 114 MG/DL — HIGH (ref 70–99)
GLUCOSE BLDC GLUCOMTR-MCNC: 114 MG/DL — HIGH (ref 70–99)
GLUCOSE BLDC GLUCOMTR-MCNC: 115 MG/DL — HIGH (ref 70–99)
GLUCOSE BLDC GLUCOMTR-MCNC: 116 MG/DL — HIGH (ref 70–99)
GLUCOSE BLDC GLUCOMTR-MCNC: 116 MG/DL — HIGH (ref 70–99)
GLUCOSE BLDC GLUCOMTR-MCNC: 118 MG/DL — HIGH (ref 70–99)
GLUCOSE BLDC GLUCOMTR-MCNC: 120 MG/DL — HIGH (ref 70–99)
GLUCOSE BLDC GLUCOMTR-MCNC: 122 MG/DL — HIGH (ref 70–99)
GLUCOSE BLDC GLUCOMTR-MCNC: 124 MG/DL — HIGH (ref 70–99)
GLUCOSE BLDC GLUCOMTR-MCNC: 126 MG/DL — HIGH (ref 70–99)
GLUCOSE BLDC GLUCOMTR-MCNC: 128 MG/DL — HIGH (ref 70–99)
GLUCOSE BLDC GLUCOMTR-MCNC: 129 MG/DL — HIGH (ref 70–99)
GLUCOSE BLDC GLUCOMTR-MCNC: 129 MG/DL — HIGH (ref 70–99)
GLUCOSE BLDC GLUCOMTR-MCNC: 130 MG/DL — HIGH (ref 70–99)
GLUCOSE BLDC GLUCOMTR-MCNC: 131 MG/DL — HIGH (ref 70–99)
GLUCOSE BLDC GLUCOMTR-MCNC: 132 MG/DL — HIGH (ref 70–99)
GLUCOSE BLDC GLUCOMTR-MCNC: 133 MG/DL — HIGH (ref 70–99)
GLUCOSE BLDC GLUCOMTR-MCNC: 134 MG/DL — HIGH (ref 70–99)
GLUCOSE BLDC GLUCOMTR-MCNC: 135 MG/DL — HIGH (ref 70–99)
GLUCOSE BLDC GLUCOMTR-MCNC: 136 MG/DL — HIGH (ref 70–99)
GLUCOSE BLDC GLUCOMTR-MCNC: 136 MG/DL — HIGH (ref 70–99)
GLUCOSE BLDC GLUCOMTR-MCNC: 137 MG/DL — HIGH (ref 70–99)
GLUCOSE BLDC GLUCOMTR-MCNC: 138 MG/DL — HIGH (ref 70–99)
GLUCOSE BLDC GLUCOMTR-MCNC: 139 MG/DL — HIGH (ref 70–99)
GLUCOSE BLDC GLUCOMTR-MCNC: 140 MG/DL — HIGH (ref 70–99)
GLUCOSE BLDC GLUCOMTR-MCNC: 141 MG/DL — HIGH (ref 70–99)
GLUCOSE BLDC GLUCOMTR-MCNC: 141 MG/DL — HIGH (ref 70–99)
GLUCOSE BLDC GLUCOMTR-MCNC: 142 MG/DL — HIGH (ref 70–99)
GLUCOSE BLDC GLUCOMTR-MCNC: 144 MG/DL — HIGH (ref 70–99)
GLUCOSE BLDC GLUCOMTR-MCNC: 145 MG/DL — HIGH (ref 70–99)
GLUCOSE BLDC GLUCOMTR-MCNC: 145 MG/DL — HIGH (ref 70–99)
GLUCOSE BLDC GLUCOMTR-MCNC: 146 MG/DL — HIGH (ref 70–99)
GLUCOSE BLDC GLUCOMTR-MCNC: 147 MG/DL — HIGH (ref 70–99)
GLUCOSE BLDC GLUCOMTR-MCNC: 148 MG/DL — HIGH (ref 70–99)
GLUCOSE BLDC GLUCOMTR-MCNC: 148 MG/DL — HIGH (ref 70–99)
GLUCOSE BLDC GLUCOMTR-MCNC: 149 MG/DL — HIGH (ref 70–99)
GLUCOSE BLDC GLUCOMTR-MCNC: 150 MG/DL — HIGH (ref 70–99)
GLUCOSE BLDC GLUCOMTR-MCNC: 151 MG/DL — HIGH (ref 70–99)
GLUCOSE BLDC GLUCOMTR-MCNC: 152 MG/DL — HIGH (ref 70–99)
GLUCOSE BLDC GLUCOMTR-MCNC: 152 MG/DL — HIGH (ref 70–99)
GLUCOSE BLDC GLUCOMTR-MCNC: 153 MG/DL — HIGH (ref 70–99)
GLUCOSE BLDC GLUCOMTR-MCNC: 154 MG/DL — HIGH (ref 70–99)
GLUCOSE BLDC GLUCOMTR-MCNC: 154 MG/DL — HIGH (ref 70–99)
GLUCOSE BLDC GLUCOMTR-MCNC: 155 MG/DL — HIGH (ref 70–99)
GLUCOSE BLDC GLUCOMTR-MCNC: 155 MG/DL — HIGH (ref 70–99)
GLUCOSE BLDC GLUCOMTR-MCNC: 157 MG/DL — HIGH (ref 70–99)
GLUCOSE BLDC GLUCOMTR-MCNC: 157 MG/DL — HIGH (ref 70–99)
GLUCOSE BLDC GLUCOMTR-MCNC: 158 MG/DL — HIGH (ref 70–99)
GLUCOSE BLDC GLUCOMTR-MCNC: 160 MG/DL — HIGH (ref 70–99)
GLUCOSE BLDC GLUCOMTR-MCNC: 160 MG/DL — HIGH (ref 70–99)
GLUCOSE BLDC GLUCOMTR-MCNC: 162 MG/DL — HIGH (ref 70–99)
GLUCOSE BLDC GLUCOMTR-MCNC: 163 MG/DL — HIGH (ref 70–99)
GLUCOSE BLDC GLUCOMTR-MCNC: 164 MG/DL — HIGH (ref 70–99)
GLUCOSE BLDC GLUCOMTR-MCNC: 166 MG/DL — HIGH (ref 70–99)
GLUCOSE BLDC GLUCOMTR-MCNC: 166 MG/DL — HIGH (ref 70–99)
GLUCOSE BLDC GLUCOMTR-MCNC: 167 MG/DL — HIGH (ref 70–99)
GLUCOSE BLDC GLUCOMTR-MCNC: 168 MG/DL — HIGH (ref 70–99)
GLUCOSE BLDC GLUCOMTR-MCNC: 169 MG/DL — HIGH (ref 70–99)
GLUCOSE BLDC GLUCOMTR-MCNC: 170 MG/DL — HIGH (ref 70–99)
GLUCOSE BLDC GLUCOMTR-MCNC: 170 MG/DL — HIGH (ref 70–99)
GLUCOSE BLDC GLUCOMTR-MCNC: 171 MG/DL — HIGH (ref 70–99)
GLUCOSE BLDC GLUCOMTR-MCNC: 172 MG/DL — HIGH (ref 70–99)
GLUCOSE BLDC GLUCOMTR-MCNC: 173 MG/DL — HIGH (ref 70–99)
GLUCOSE BLDC GLUCOMTR-MCNC: 174 MG/DL — HIGH (ref 70–99)
GLUCOSE BLDC GLUCOMTR-MCNC: 175 MG/DL — HIGH (ref 70–99)
GLUCOSE BLDC GLUCOMTR-MCNC: 176 MG/DL — HIGH (ref 70–99)
GLUCOSE BLDC GLUCOMTR-MCNC: 177 MG/DL — HIGH (ref 70–99)
GLUCOSE BLDC GLUCOMTR-MCNC: 178 MG/DL — HIGH (ref 70–99)
GLUCOSE BLDC GLUCOMTR-MCNC: 178 MG/DL — HIGH (ref 70–99)
GLUCOSE BLDC GLUCOMTR-MCNC: 179 MG/DL — HIGH (ref 70–99)
GLUCOSE BLDC GLUCOMTR-MCNC: 179 MG/DL — HIGH (ref 70–99)
GLUCOSE BLDC GLUCOMTR-MCNC: 180 MG/DL — HIGH (ref 70–99)
GLUCOSE BLDC GLUCOMTR-MCNC: 181 MG/DL — HIGH (ref 70–99)
GLUCOSE BLDC GLUCOMTR-MCNC: 182 MG/DL — HIGH (ref 70–99)
GLUCOSE BLDC GLUCOMTR-MCNC: 182 MG/DL — HIGH (ref 70–99)
GLUCOSE BLDC GLUCOMTR-MCNC: 183 MG/DL — HIGH (ref 70–99)
GLUCOSE BLDC GLUCOMTR-MCNC: 184 MG/DL — HIGH (ref 70–99)
GLUCOSE BLDC GLUCOMTR-MCNC: 184 MG/DL — HIGH (ref 70–99)
GLUCOSE BLDC GLUCOMTR-MCNC: 185 MG/DL — HIGH (ref 70–99)
GLUCOSE BLDC GLUCOMTR-MCNC: 185 MG/DL — HIGH (ref 70–99)
GLUCOSE BLDC GLUCOMTR-MCNC: 187 MG/DL — HIGH (ref 70–99)
GLUCOSE BLDC GLUCOMTR-MCNC: 187 MG/DL — HIGH (ref 70–99)
GLUCOSE BLDC GLUCOMTR-MCNC: 188 MG/DL — HIGH (ref 70–99)
GLUCOSE BLDC GLUCOMTR-MCNC: 188 MG/DL — HIGH (ref 70–99)
GLUCOSE BLDC GLUCOMTR-MCNC: 189 MG/DL — HIGH (ref 70–99)
GLUCOSE BLDC GLUCOMTR-MCNC: 190 MG/DL — HIGH (ref 70–99)
GLUCOSE BLDC GLUCOMTR-MCNC: 192 MG/DL — HIGH (ref 70–99)
GLUCOSE BLDC GLUCOMTR-MCNC: 193 MG/DL — HIGH (ref 70–99)
GLUCOSE BLDC GLUCOMTR-MCNC: 193 MG/DL — HIGH (ref 70–99)
GLUCOSE BLDC GLUCOMTR-MCNC: 194 MG/DL — HIGH (ref 70–99)
GLUCOSE BLDC GLUCOMTR-MCNC: 195 MG/DL — HIGH (ref 70–99)
GLUCOSE BLDC GLUCOMTR-MCNC: 195 MG/DL — HIGH (ref 70–99)
GLUCOSE BLDC GLUCOMTR-MCNC: 197 MG/DL — HIGH (ref 70–99)
GLUCOSE BLDC GLUCOMTR-MCNC: 198 MG/DL — HIGH (ref 70–99)
GLUCOSE BLDC GLUCOMTR-MCNC: 200 MG/DL — HIGH (ref 70–99)
GLUCOSE BLDC GLUCOMTR-MCNC: 201 MG/DL — HIGH (ref 70–99)
GLUCOSE BLDC GLUCOMTR-MCNC: 202 MG/DL — HIGH (ref 70–99)
GLUCOSE BLDC GLUCOMTR-MCNC: 203 MG/DL — HIGH (ref 70–99)
GLUCOSE BLDC GLUCOMTR-MCNC: 205 MG/DL — HIGH (ref 70–99)
GLUCOSE BLDC GLUCOMTR-MCNC: 206 MG/DL — HIGH (ref 70–99)
GLUCOSE BLDC GLUCOMTR-MCNC: 206 MG/DL — HIGH (ref 70–99)
GLUCOSE BLDC GLUCOMTR-MCNC: 207 MG/DL — HIGH (ref 70–99)
GLUCOSE BLDC GLUCOMTR-MCNC: 208 MG/DL — HIGH (ref 70–99)
GLUCOSE BLDC GLUCOMTR-MCNC: 208 MG/DL — HIGH (ref 70–99)
GLUCOSE BLDC GLUCOMTR-MCNC: 209 MG/DL — HIGH (ref 70–99)
GLUCOSE BLDC GLUCOMTR-MCNC: 210 MG/DL — HIGH (ref 70–99)
GLUCOSE BLDC GLUCOMTR-MCNC: 212 MG/DL — HIGH (ref 70–99)
GLUCOSE BLDC GLUCOMTR-MCNC: 212 MG/DL — HIGH (ref 70–99)
GLUCOSE BLDC GLUCOMTR-MCNC: 213 MG/DL — HIGH (ref 70–99)
GLUCOSE BLDC GLUCOMTR-MCNC: 214 MG/DL — HIGH (ref 70–99)
GLUCOSE BLDC GLUCOMTR-MCNC: 215 MG/DL — HIGH (ref 70–99)
GLUCOSE BLDC GLUCOMTR-MCNC: 215 MG/DL — HIGH (ref 70–99)
GLUCOSE BLDC GLUCOMTR-MCNC: 216 MG/DL — HIGH (ref 70–99)
GLUCOSE BLDC GLUCOMTR-MCNC: 217 MG/DL — HIGH (ref 70–99)
GLUCOSE BLDC GLUCOMTR-MCNC: 217 MG/DL — HIGH (ref 70–99)
GLUCOSE BLDC GLUCOMTR-MCNC: 218 MG/DL — HIGH (ref 70–99)
GLUCOSE BLDC GLUCOMTR-MCNC: 219 MG/DL — HIGH (ref 70–99)
GLUCOSE BLDC GLUCOMTR-MCNC: 220 MG/DL — HIGH (ref 70–99)
GLUCOSE BLDC GLUCOMTR-MCNC: 220 MG/DL — HIGH (ref 70–99)
GLUCOSE BLDC GLUCOMTR-MCNC: 222 MG/DL — HIGH (ref 70–99)
GLUCOSE BLDC GLUCOMTR-MCNC: 222 MG/DL — HIGH (ref 70–99)
GLUCOSE BLDC GLUCOMTR-MCNC: 224 MG/DL — HIGH (ref 70–99)
GLUCOSE BLDC GLUCOMTR-MCNC: 228 MG/DL — HIGH (ref 70–99)
GLUCOSE BLDC GLUCOMTR-MCNC: 229 MG/DL — HIGH (ref 70–99)
GLUCOSE BLDC GLUCOMTR-MCNC: 230 MG/DL — HIGH (ref 70–99)
GLUCOSE BLDC GLUCOMTR-MCNC: 231 MG/DL — HIGH (ref 70–99)
GLUCOSE BLDC GLUCOMTR-MCNC: 232 MG/DL — HIGH (ref 70–99)
GLUCOSE BLDC GLUCOMTR-MCNC: 233 MG/DL — HIGH (ref 70–99)
GLUCOSE BLDC GLUCOMTR-MCNC: 234 MG/DL — HIGH (ref 70–99)
GLUCOSE BLDC GLUCOMTR-MCNC: 236 MG/DL — HIGH (ref 70–99)
GLUCOSE BLDC GLUCOMTR-MCNC: 238 MG/DL — HIGH (ref 70–99)
GLUCOSE BLDC GLUCOMTR-MCNC: 238 MG/DL — HIGH (ref 70–99)
GLUCOSE BLDC GLUCOMTR-MCNC: 239 MG/DL — HIGH (ref 70–99)
GLUCOSE BLDC GLUCOMTR-MCNC: 240 MG/DL — HIGH (ref 70–99)
GLUCOSE BLDC GLUCOMTR-MCNC: 241 MG/DL — HIGH (ref 70–99)
GLUCOSE BLDC GLUCOMTR-MCNC: 242 MG/DL — HIGH (ref 70–99)
GLUCOSE BLDC GLUCOMTR-MCNC: 242 MG/DL — HIGH (ref 70–99)
GLUCOSE BLDC GLUCOMTR-MCNC: 248 MG/DL — HIGH (ref 70–99)
GLUCOSE BLDC GLUCOMTR-MCNC: 251 MG/DL — HIGH (ref 70–99)
GLUCOSE BLDC GLUCOMTR-MCNC: 253 MG/DL — HIGH (ref 70–99)
GLUCOSE BLDC GLUCOMTR-MCNC: 253 MG/DL — HIGH (ref 70–99)
GLUCOSE BLDC GLUCOMTR-MCNC: 254 MG/DL — HIGH (ref 70–99)
GLUCOSE BLDC GLUCOMTR-MCNC: 255 MG/DL — HIGH (ref 70–99)
GLUCOSE BLDC GLUCOMTR-MCNC: 255 MG/DL — HIGH (ref 70–99)
GLUCOSE BLDC GLUCOMTR-MCNC: 256 MG/DL — HIGH (ref 70–99)
GLUCOSE BLDC GLUCOMTR-MCNC: 256 MG/DL — HIGH (ref 70–99)
GLUCOSE BLDC GLUCOMTR-MCNC: 258 MG/DL — HIGH (ref 70–99)
GLUCOSE BLDC GLUCOMTR-MCNC: 258 MG/DL — HIGH (ref 70–99)
GLUCOSE BLDC GLUCOMTR-MCNC: 259 MG/DL — HIGH (ref 70–99)
GLUCOSE BLDC GLUCOMTR-MCNC: 260 MG/DL — HIGH (ref 70–99)
GLUCOSE BLDC GLUCOMTR-MCNC: 261 MG/DL — HIGH (ref 70–99)
GLUCOSE BLDC GLUCOMTR-MCNC: 266 MG/DL — HIGH (ref 70–99)
GLUCOSE BLDC GLUCOMTR-MCNC: 268 MG/DL — HIGH (ref 70–99)
GLUCOSE BLDC GLUCOMTR-MCNC: 268 MG/DL — HIGH (ref 70–99)
GLUCOSE BLDC GLUCOMTR-MCNC: 278 MG/DL — HIGH (ref 70–99)
GLUCOSE BLDC GLUCOMTR-MCNC: 278 MG/DL — HIGH (ref 70–99)
GLUCOSE BLDC GLUCOMTR-MCNC: 280 MG/DL — HIGH (ref 70–99)
GLUCOSE BLDC GLUCOMTR-MCNC: 283 MG/DL — HIGH (ref 70–99)
GLUCOSE BLDC GLUCOMTR-MCNC: 284 MG/DL — HIGH (ref 70–99)
GLUCOSE BLDC GLUCOMTR-MCNC: 286 MG/DL — HIGH (ref 70–99)
GLUCOSE BLDC GLUCOMTR-MCNC: 286 MG/DL — HIGH (ref 70–99)
GLUCOSE BLDC GLUCOMTR-MCNC: 296 MG/DL — HIGH (ref 70–99)
GLUCOSE BLDC GLUCOMTR-MCNC: 299 MG/DL — HIGH (ref 70–99)
GLUCOSE BLDC GLUCOMTR-MCNC: 308 MG/DL — HIGH (ref 70–99)
GLUCOSE BLDC GLUCOMTR-MCNC: 309 MG/DL — HIGH (ref 70–99)
GLUCOSE BLDC GLUCOMTR-MCNC: 311 MG/DL — HIGH (ref 70–99)
GLUCOSE BLDC GLUCOMTR-MCNC: 367 MG/DL — HIGH (ref 70–99)
GLUCOSE BLDC GLUCOMTR-MCNC: 378 MG/DL — HIGH (ref 70–99)
GLUCOSE BLDC GLUCOMTR-MCNC: 382 MG/DL — HIGH (ref 70–99)
GLUCOSE BLDC GLUCOMTR-MCNC: 56 MG/DL — LOW (ref 70–99)
GLUCOSE BLDC GLUCOMTR-MCNC: 67 MG/DL — LOW (ref 70–99)
GLUCOSE BLDC GLUCOMTR-MCNC: 70 MG/DL — SIGNIFICANT CHANGE UP (ref 70–99)
GLUCOSE BLDC GLUCOMTR-MCNC: 71 MG/DL — SIGNIFICANT CHANGE UP (ref 70–99)
GLUCOSE BLDC GLUCOMTR-MCNC: 76 MG/DL — SIGNIFICANT CHANGE UP (ref 70–99)
GLUCOSE BLDC GLUCOMTR-MCNC: 77 MG/DL — SIGNIFICANT CHANGE UP (ref 70–99)
GLUCOSE BLDC GLUCOMTR-MCNC: 80 MG/DL — SIGNIFICANT CHANGE UP (ref 70–99)
GLUCOSE BLDC GLUCOMTR-MCNC: 83 MG/DL — SIGNIFICANT CHANGE UP (ref 70–99)
GLUCOSE BLDC GLUCOMTR-MCNC: 83 MG/DL — SIGNIFICANT CHANGE UP (ref 70–99)
GLUCOSE BLDC GLUCOMTR-MCNC: 91 MG/DL — SIGNIFICANT CHANGE UP (ref 70–99)
GLUCOSE BLDC GLUCOMTR-MCNC: 92 MG/DL — SIGNIFICANT CHANGE UP (ref 70–99)
GLUCOSE BLDC GLUCOMTR-MCNC: 92 MG/DL — SIGNIFICANT CHANGE UP (ref 70–99)
GLUCOSE BLDC GLUCOMTR-MCNC: 95 MG/DL — SIGNIFICANT CHANGE UP (ref 70–99)
GLUCOSE BLDC GLUCOMTR-MCNC: 97 MG/DL — SIGNIFICANT CHANGE UP (ref 70–99)
GLUCOSE BLDC GLUCOMTR-MCNC: 97 MG/DL — SIGNIFICANT CHANGE UP (ref 70–99)
GLUCOSE BLDC GLUCOMTR-MCNC: 99 MG/DL — SIGNIFICANT CHANGE UP (ref 70–99)
GLUCOSE BLDC GLUCOMTR-MCNC: >530 MG/DL — CRITICAL HIGH (ref 70–99)
GLUCOSE SERPL-MCNC: 104 MG/DL — HIGH (ref 70–99)
GLUCOSE SERPL-MCNC: 114 MG/DL — HIGH (ref 70–99)
GLUCOSE SERPL-MCNC: 124 MG/DL — HIGH (ref 70–99)
GLUCOSE SERPL-MCNC: 129 MG/DL — HIGH (ref 70–99)
GLUCOSE SERPL-MCNC: 133 MG/DL — HIGH (ref 70–99)
GLUCOSE SERPL-MCNC: 141 MG/DL — HIGH (ref 70–99)
GLUCOSE SERPL-MCNC: 141 MG/DL — HIGH (ref 70–99)
GLUCOSE SERPL-MCNC: 143 MG/DL — HIGH (ref 70–99)
GLUCOSE SERPL-MCNC: 151 MG/DL — HIGH (ref 70–99)
GLUCOSE SERPL-MCNC: 153 MG/DL — HIGH (ref 70–99)
GLUCOSE SERPL-MCNC: 157 MG/DL — HIGH (ref 70–99)
GLUCOSE SERPL-MCNC: 164 MG/DL — HIGH (ref 70–99)
GLUCOSE SERPL-MCNC: 172 MG/DL — HIGH (ref 70–99)
GLUCOSE SERPL-MCNC: 174 MG/DL — HIGH (ref 70–99)
GLUCOSE SERPL-MCNC: 181 MG/DL — HIGH (ref 70–99)
GLUCOSE SERPL-MCNC: 190 MG/DL — HIGH (ref 70–99)
GLUCOSE SERPL-MCNC: 191 MG/DL — HIGH (ref 70–99)
GLUCOSE SERPL-MCNC: 192 MG/DL — HIGH (ref 70–99)
GLUCOSE SERPL-MCNC: 192 MG/DL — HIGH (ref 70–99)
GLUCOSE SERPL-MCNC: 195 MG/DL — HIGH (ref 70–99)
GLUCOSE SERPL-MCNC: 196 MG/DL — HIGH (ref 70–99)
GLUCOSE SERPL-MCNC: 201 MG/DL — HIGH (ref 70–99)
GLUCOSE SERPL-MCNC: 204 MG/DL — HIGH (ref 70–99)
GLUCOSE SERPL-MCNC: 214 MG/DL — HIGH (ref 70–99)
GLUCOSE SERPL-MCNC: 217 MG/DL — HIGH (ref 70–99)
GLUCOSE SERPL-MCNC: 221 MG/DL — HIGH (ref 70–99)
GLUCOSE SERPL-MCNC: 221 MG/DL — HIGH (ref 70–99)
GLUCOSE SERPL-MCNC: 223 MG/DL — HIGH (ref 70–99)
GLUCOSE SERPL-MCNC: 223 MG/DL — HIGH (ref 70–99)
GLUCOSE SERPL-MCNC: 231 MG/DL — HIGH (ref 70–99)
GLUCOSE SERPL-MCNC: 235 MG/DL — HIGH (ref 70–99)
GLUCOSE SERPL-MCNC: 244 MG/DL — HIGH (ref 70–99)
GLUCOSE SERPL-MCNC: 246 MG/DL — HIGH (ref 70–99)
GLUCOSE SERPL-MCNC: 250 MG/DL — HIGH (ref 70–99)
GLUCOSE SERPL-MCNC: 254 MG/DL — HIGH (ref 70–99)
GLUCOSE SERPL-MCNC: 260 MG/DL — HIGH (ref 70–99)
GLUCOSE SERPL-MCNC: 278 MG/DL — HIGH (ref 70–99)
GLUCOSE SERPL-MCNC: 289 MG/DL — HIGH (ref 70–99)
GLUCOSE SERPL-MCNC: 304 MG/DL — HIGH (ref 70–99)
GLUCOSE SERPL-MCNC: 361 MG/DL — HIGH (ref 70–99)
GLUCOSE SERPL-MCNC: 78 MG/DL — SIGNIFICANT CHANGE UP (ref 70–99)
GLUCOSE SERPL-MCNC: 81 MG/DL — SIGNIFICANT CHANGE UP (ref 70–99)
GLUCOSE SERPL-MCNC: 95 MG/DL — SIGNIFICANT CHANGE UP (ref 70–99)
GLUCOSE SERPL-MCNC: 97 MG/DL — SIGNIFICANT CHANGE UP (ref 70–99)
GLUCOSE UR QL: NEGATIVE MG/DL — SIGNIFICANT CHANGE UP
GRAM STN FLD: SIGNIFICANT CHANGE UP
GRAM STN FLD: SIGNIFICANT CHANGE UP
HBA1C BLD-MCNC: 9.6 % — HIGH (ref 4–5.6)
HCO3 BLDA-SCNC: 24 MMOL/L — SIGNIFICANT CHANGE UP (ref 20–26)
HCO3 BLDA-SCNC: 24 MMOL/L — SIGNIFICANT CHANGE UP (ref 20–26)
HCO3 BLDA-SCNC: 30 MMOL/L — HIGH (ref 20–26)
HCO3 BLDA-SCNC: 31 MMOL/L — HIGH (ref 20–26)
HCT VFR BLD CALC: 21.7 % — LOW (ref 39–50)
HCT VFR BLD CALC: 21.8 % — LOW (ref 39–50)
HCT VFR BLD CALC: 22.6 % — LOW (ref 39–50)
HCT VFR BLD CALC: 23.3 % — LOW (ref 39–50)
HCT VFR BLD CALC: 23.4 % — LOW (ref 39–50)
HCT VFR BLD CALC: 23.7 % — LOW (ref 39–50)
HCT VFR BLD CALC: 23.8 % — LOW (ref 39–50)
HCT VFR BLD CALC: 24.3 % — LOW (ref 39–50)
HCT VFR BLD CALC: 24.8 % — LOW (ref 39–50)
HCT VFR BLD CALC: 25.3 % — LOW (ref 39–50)
HCT VFR BLD CALC: 25.6 % — LOW (ref 39–50)
HCT VFR BLD CALC: 26.1 % — LOW (ref 39–50)
HCT VFR BLD CALC: 26.8 % — LOW (ref 39–50)
HCT VFR BLD CALC: 26.8 % — LOW (ref 39–50)
HCT VFR BLD CALC: 26.9 % — LOW (ref 39–50)
HCT VFR BLD CALC: 27.2 % — LOW (ref 39–50)
HCT VFR BLD CALC: 27.5 % — LOW (ref 39–50)
HCT VFR BLD CALC: 28 % — LOW (ref 39–50)
HCT VFR BLD CALC: 28.5 % — LOW (ref 39–50)
HCT VFR BLD CALC: 28.8 % — LOW (ref 39–50)
HCT VFR BLD CALC: 28.9 % — LOW (ref 39–50)
HCT VFR BLD CALC: 29.2 % — LOW (ref 39–50)
HCT VFR BLD CALC: 29.6 % — LOW (ref 39–50)
HCT VFR BLD CALC: 30 % — LOW (ref 39–50)
HCT VFR BLD CALC: 30.2 % — LOW (ref 39–50)
HCT VFR BLD CALC: 32.2 % — LOW (ref 39–50)
HCT VFR BLD CALC: 32.4 % — LOW (ref 39–50)
HCT VFR BLD CALC: 33.2 % — LOW (ref 39–50)
HCT VFR BLD CALC: 33.7 % — LOW (ref 39–50)
HCT VFR BLD CALC: 34.3 % — LOW (ref 39–50)
HCT VFR BLD CALC: 34.7 % — LOW (ref 39–50)
HCT VFR BLD CALC: 35.8 % — LOW (ref 39–50)
HCT VFR BLD CALC: 35.9 % — LOW (ref 39–50)
HCT VFR BLD CALC: 36 % — LOW (ref 39–50)
HCT VFR BLD CALC: 36.8 % — LOW (ref 39–50)
HCT VFR BLD CALC: 37 % — LOW (ref 39–50)
HCT VFR BLD CALC: 37 % — LOW (ref 39–50)
HCT VFR BLD CALC: 37.8 % — LOW (ref 39–50)
HCT VFR BLD CALC: 40 % — SIGNIFICANT CHANGE UP (ref 39–50)
HCT VFR BLD CALC: 41.4 % — SIGNIFICANT CHANGE UP (ref 39–50)
HCT VFR BLD CALC: 41.9 % — SIGNIFICANT CHANGE UP (ref 39–50)
HCYS SERPL-MCNC: 8.3 UMOL/L — SIGNIFICANT CHANGE UP
HDLC SERPL-MCNC: 21 MG/DL — LOW
HDLC SERPL-MCNC: 49 MG/DL — SIGNIFICANT CHANGE UP
HGB BLD-MCNC: 10.5 G/DL — LOW (ref 13–17)
HGB BLD-MCNC: 11.3 G/DL — LOW (ref 13–17)
HGB BLD-MCNC: 11.6 G/DL — LOW (ref 13–17)
HGB BLD-MCNC: 11.7 G/DL — LOW (ref 13–17)
HGB BLD-MCNC: 11.7 G/DL — LOW (ref 13–17)
HGB BLD-MCNC: 11.8 G/DL — LOW (ref 13–17)
HGB BLD-MCNC: 11.9 G/DL — LOW (ref 13–17)
HGB BLD-MCNC: 11.9 G/DL — LOW (ref 13–17)
HGB BLD-MCNC: 12.1 G/DL — LOW (ref 13–17)
HGB BLD-MCNC: 13.2 G/DL — SIGNIFICANT CHANGE UP (ref 13–17)
HGB BLD-MCNC: 13.3 G/DL — SIGNIFICANT CHANGE UP (ref 13–17)
HGB BLD-MCNC: 13.4 G/DL — SIGNIFICANT CHANGE UP (ref 13–17)
HGB BLD-MCNC: 6.6 G/DL — CRITICAL LOW (ref 13–17)
HGB BLD-MCNC: 6.6 G/DL — CRITICAL LOW (ref 13–17)
HGB BLD-MCNC: 6.9 G/DL — CRITICAL LOW (ref 13–17)
HGB BLD-MCNC: 7 G/DL — CRITICAL LOW (ref 13–17)
HGB BLD-MCNC: 7.1 G/DL — LOW (ref 13–17)
HGB BLD-MCNC: 7.1 G/DL — LOW (ref 13–17)
HGB BLD-MCNC: 7.2 G/DL — LOW (ref 13–17)
HGB BLD-MCNC: 7.2 G/DL — LOW (ref 13–17)
HGB BLD-MCNC: 7.3 G/DL — LOW (ref 13–17)
HGB BLD-MCNC: 7.4 G/DL — LOW (ref 13–17)
HGB BLD-MCNC: 7.6 G/DL — LOW (ref 13–17)
HGB BLD-MCNC: 7.6 G/DL — LOW (ref 13–17)
HGB BLD-MCNC: 7.7 G/DL — LOW (ref 13–17)
HGB BLD-MCNC: 7.7 G/DL — LOW (ref 13–17)
HGB BLD-MCNC: 8 G/DL — LOW (ref 13–17)
HGB BLD-MCNC: 8.1 G/DL — LOW (ref 13–17)
HGB BLD-MCNC: 8.2 G/DL — LOW (ref 13–17)
HGB BLD-MCNC: 8.3 G/DL — LOW (ref 13–17)
HGB BLD-MCNC: 8.4 G/DL — LOW (ref 13–17)
HGB BLD-MCNC: 8.4 G/DL — LOW (ref 13–17)
HGB BLD-MCNC: 8.7 G/DL — LOW (ref 13–17)
HGB BLD-MCNC: 8.7 G/DL — LOW (ref 13–17)
HGB BLD-MCNC: 8.8 G/DL — LOW (ref 13–17)
HGB BLD-MCNC: 8.9 G/DL — LOW (ref 13–17)
HGB BLD-MCNC: 8.9 G/DL — LOW (ref 13–17)
HGB BLD-MCNC: 9.5 G/DL — LOW (ref 13–17)
HGB BLD-MCNC: 9.9 G/DL — LOW (ref 13–17)
HOROWITZ INDEX BLDA+IHG-RTO: SIGNIFICANT CHANGE UP
HYPOCHROMIA BLD QL: SLIGHT — SIGNIFICANT CHANGE UP
HYPOCHROMIA BLD QL: SLIGHT — SIGNIFICANT CHANGE UP
IMM GRANULOCYTES NFR BLD AUTO: 0.4 % — SIGNIFICANT CHANGE UP (ref 0–1.5)
IMM GRANULOCYTES NFR BLD AUTO: 0.6 % — SIGNIFICANT CHANGE UP (ref 0–1.5)
IMM GRANULOCYTES NFR BLD AUTO: 0.7 % — SIGNIFICANT CHANGE UP (ref 0–1.5)
IMM GRANULOCYTES NFR BLD AUTO: 0.8 % — SIGNIFICANT CHANGE UP (ref 0–1.5)
IMM GRANULOCYTES NFR BLD AUTO: 0.9 % — SIGNIFICANT CHANGE UP (ref 0–1.5)
IMM GRANULOCYTES NFR BLD AUTO: 1 % — SIGNIFICANT CHANGE UP (ref 0–1.5)
IMM GRANULOCYTES NFR BLD AUTO: 1 % — SIGNIFICANT CHANGE UP (ref 0–1.5)
IMM GRANULOCYTES NFR BLD AUTO: 1.1 % — SIGNIFICANT CHANGE UP (ref 0–1.5)
IMM GRANULOCYTES NFR BLD AUTO: 1.1 % — SIGNIFICANT CHANGE UP (ref 0–1.5)
IMM GRANULOCYTES NFR BLD AUTO: 1.3 % — SIGNIFICANT CHANGE UP (ref 0–1.5)
IMM GRANULOCYTES NFR BLD AUTO: 1.3 % — SIGNIFICANT CHANGE UP (ref 0–1.5)
IMM GRANULOCYTES NFR BLD AUTO: 1.5 % — SIGNIFICANT CHANGE UP (ref 0–1.5)
IMM GRANULOCYTES NFR BLD AUTO: 1.7 % — HIGH (ref 0–1.5)
IMM GRANULOCYTES NFR BLD AUTO: 1.8 % — HIGH (ref 0–1.5)
IMM GRANULOCYTES NFR BLD AUTO: 1.9 % — HIGH (ref 0–1.5)
IMM GRANULOCYTES NFR BLD AUTO: 2.1 % — HIGH (ref 0–1.5)
IMM GRANULOCYTES NFR BLD AUTO: 2.4 % — HIGH (ref 0–1.5)
IMM GRANULOCYTES NFR BLD AUTO: 2.4 % — HIGH (ref 0–1.5)
IMM GRANULOCYTES NFR BLD AUTO: 2.7 % — HIGH (ref 0–1.5)
IMM GRANULOCYTES NFR BLD AUTO: 2.7 % — HIGH (ref 0–1.5)
IMM GRANULOCYTES NFR BLD AUTO: 2.9 % — HIGH (ref 0–1.5)
IMM GRANULOCYTES NFR BLD AUTO: 3 % — HIGH (ref 0–1.5)
IMM GRANULOCYTES NFR BLD AUTO: 3.1 % — HIGH (ref 0–1.5)
IMM GRANULOCYTES NFR BLD AUTO: 3.2 % — HIGH (ref 0–1.5)
IMM GRANULOCYTES NFR BLD AUTO: 3.2 % — HIGH (ref 0–1.5)
IMM GRANULOCYTES NFR BLD AUTO: 3.3 % — HIGH (ref 0–1.5)
IMM GRANULOCYTES NFR BLD AUTO: 3.4 % — HIGH (ref 0–1.5)
IMM GRANULOCYTES NFR BLD AUTO: 3.7 % — HIGH (ref 0–1.5)
IMM GRANULOCYTES NFR BLD AUTO: 3.9 % — HIGH (ref 0–1.5)
IMM GRANULOCYTES NFR BLD AUTO: 4.8 % — HIGH (ref 0–1.5)
IMM GRANULOCYTES NFR BLD AUTO: 4.9 % — HIGH (ref 0–1.5)
INR BLD: 1.19 RATIO — HIGH (ref 0.88–1.16)
INR BLD: 1.28 RATIO — HIGH (ref 0.88–1.16)
INR BLD: 1.33 RATIO — HIGH (ref 0.88–1.16)
INR BLD: 1.33 RATIO — HIGH (ref 0.88–1.16)
INR BLD: 1.52 RATIO — HIGH (ref 0.88–1.16)
KETONES UR-MCNC: NEGATIVE — SIGNIFICANT CHANGE UP
LDH SERPL L TO P-CCNC: 249 U/L — HIGH (ref 98–192)
LDH SERPL L TO P-CCNC: 286 U/L — HIGH (ref 98–192)
LDH SERPL L TO P-CCNC: 296 U/L — HIGH (ref 98–192)
LDH SERPL L TO P-CCNC: 332 U/L — HIGH (ref 98–192)
LDH SERPL L TO P-CCNC: 607 U/L — HIGH (ref 98–192)
LEUKOCYTE ESTERASE UR-ACNC: NEGATIVE — SIGNIFICANT CHANGE UP
LIPID PNL WITH DIRECT LDL SERPL: 20 MG/DL — SIGNIFICANT CHANGE UP
LIPID PNL WITH DIRECT LDL SERPL: 31 MG/DL — SIGNIFICANT CHANGE UP
LYMPHOCYTES # BLD AUTO: 0.12 K/UL — LOW (ref 1–3.3)
LYMPHOCYTES # BLD AUTO: 0.15 K/UL — LOW (ref 1–3.3)
LYMPHOCYTES # BLD AUTO: 0.25 K/UL — LOW (ref 1–3.3)
LYMPHOCYTES # BLD AUTO: 0.26 K/UL — LOW (ref 1–3.3)
LYMPHOCYTES # BLD AUTO: 0.36 K/UL — LOW (ref 1–3.3)
LYMPHOCYTES # BLD AUTO: 0.37 K/UL — LOW (ref 1–3.3)
LYMPHOCYTES # BLD AUTO: 0.43 K/UL — LOW (ref 1–3.3)
LYMPHOCYTES # BLD AUTO: 0.44 K/UL — LOW (ref 1–3.3)
LYMPHOCYTES # BLD AUTO: 0.51 K/UL — LOW (ref 1–3.3)
LYMPHOCYTES # BLD AUTO: 0.54 K/UL — LOW (ref 1–3.3)
LYMPHOCYTES # BLD AUTO: 0.56 K/UL — LOW (ref 1–3.3)
LYMPHOCYTES # BLD AUTO: 0.56 K/UL — LOW (ref 1–3.3)
LYMPHOCYTES # BLD AUTO: 0.66 K/UL — LOW (ref 1–3.3)
LYMPHOCYTES # BLD AUTO: 0.69 K/UL — LOW (ref 1–3.3)
LYMPHOCYTES # BLD AUTO: 0.69 K/UL — LOW (ref 1–3.3)
LYMPHOCYTES # BLD AUTO: 0.76 K/UL — LOW (ref 1–3.3)
LYMPHOCYTES # BLD AUTO: 0.78 K/UL — LOW (ref 1–3.3)
LYMPHOCYTES # BLD AUTO: 0.78 K/UL — LOW (ref 1–3.3)
LYMPHOCYTES # BLD AUTO: 0.8 K/UL — LOW (ref 1–3.3)
LYMPHOCYTES # BLD AUTO: 0.84 K/UL — LOW (ref 1–3.3)
LYMPHOCYTES # BLD AUTO: 0.84 K/UL — LOW (ref 1–3.3)
LYMPHOCYTES # BLD AUTO: 0.85 K/UL — LOW (ref 1–3.3)
LYMPHOCYTES # BLD AUTO: 0.86 K/UL — LOW (ref 1–3.3)
LYMPHOCYTES # BLD AUTO: 0.89 K/UL — LOW (ref 1–3.3)
LYMPHOCYTES # BLD AUTO: 0.91 K/UL — LOW (ref 1–3.3)
LYMPHOCYTES # BLD AUTO: 0.93 K/UL — LOW (ref 1–3.3)
LYMPHOCYTES # BLD AUTO: 0.93 K/UL — LOW (ref 1–3.3)
LYMPHOCYTES # BLD AUTO: 0.94 K/UL — LOW (ref 1–3.3)
LYMPHOCYTES # BLD AUTO: 0.99 K/UL — LOW (ref 1–3.3)
LYMPHOCYTES # BLD AUTO: 1 K/UL — SIGNIFICANT CHANGE UP (ref 1–3.3)
LYMPHOCYTES # BLD AUTO: 1.06 K/UL — SIGNIFICANT CHANGE UP (ref 1–3.3)
LYMPHOCYTES # BLD AUTO: 1.07 K/UL — SIGNIFICANT CHANGE UP (ref 1–3.3)
LYMPHOCYTES # BLD AUTO: 1.08 K/UL — SIGNIFICANT CHANGE UP (ref 1–3.3)
LYMPHOCYTES # BLD AUTO: 1.11 K/UL — SIGNIFICANT CHANGE UP (ref 1–3.3)
LYMPHOCYTES # BLD AUTO: 1.13 K/UL — SIGNIFICANT CHANGE UP (ref 1–3.3)
LYMPHOCYTES # BLD AUTO: 1.14 K/UL — SIGNIFICANT CHANGE UP (ref 1–3.3)
LYMPHOCYTES # BLD AUTO: 1.15 K/UL — SIGNIFICANT CHANGE UP (ref 1–3.3)
LYMPHOCYTES # BLD AUTO: 1.24 K/UL — SIGNIFICANT CHANGE UP (ref 1–3.3)
LYMPHOCYTES # BLD AUTO: 1.7 % — LOW (ref 13–44)
LYMPHOCYTES # BLD AUTO: 1.7 % — LOW (ref 13–44)
LYMPHOCYTES # BLD AUTO: 10.5 % — LOW (ref 13–44)
LYMPHOCYTES # BLD AUTO: 11 % — LOW (ref 13–44)
LYMPHOCYTES # BLD AUTO: 11.1 % — LOW (ref 13–44)
LYMPHOCYTES # BLD AUTO: 12.2 % — LOW (ref 13–44)
LYMPHOCYTES # BLD AUTO: 12.2 % — LOW (ref 13–44)
LYMPHOCYTES # BLD AUTO: 12.6 % — LOW (ref 13–44)
LYMPHOCYTES # BLD AUTO: 12.9 % — LOW (ref 13–44)
LYMPHOCYTES # BLD AUTO: 13 % — SIGNIFICANT CHANGE UP (ref 13–44)
LYMPHOCYTES # BLD AUTO: 13.6 % — SIGNIFICANT CHANGE UP (ref 13–44)
LYMPHOCYTES # BLD AUTO: 13.7 % — SIGNIFICANT CHANGE UP (ref 13–44)
LYMPHOCYTES # BLD AUTO: 14.3 % — SIGNIFICANT CHANGE UP (ref 13–44)
LYMPHOCYTES # BLD AUTO: 14.4 % — SIGNIFICANT CHANGE UP (ref 13–44)
LYMPHOCYTES # BLD AUTO: 14.7 % — SIGNIFICANT CHANGE UP (ref 13–44)
LYMPHOCYTES # BLD AUTO: 15.1 % — SIGNIFICANT CHANGE UP (ref 13–44)
LYMPHOCYTES # BLD AUTO: 15.3 % — SIGNIFICANT CHANGE UP (ref 13–44)
LYMPHOCYTES # BLD AUTO: 17.3 % — SIGNIFICANT CHANGE UP (ref 13–44)
LYMPHOCYTES # BLD AUTO: 19.1 % — SIGNIFICANT CHANGE UP (ref 13–44)
LYMPHOCYTES # BLD AUTO: 19.2 % — SIGNIFICANT CHANGE UP (ref 13–44)
LYMPHOCYTES # BLD AUTO: 2.1 % — LOW (ref 13–44)
LYMPHOCYTES # BLD AUTO: 20.8 % — SIGNIFICANT CHANGE UP (ref 13–44)
LYMPHOCYTES # BLD AUTO: 22.1 % — SIGNIFICANT CHANGE UP (ref 13–44)
LYMPHOCYTES # BLD AUTO: 22.2 % — SIGNIFICANT CHANGE UP (ref 13–44)
LYMPHOCYTES # BLD AUTO: 24.6 % — SIGNIFICANT CHANGE UP (ref 13–44)
LYMPHOCYTES # BLD AUTO: 28 % — SIGNIFICANT CHANGE UP (ref 13–44)
LYMPHOCYTES # BLD AUTO: 3.1 % — LOW (ref 13–44)
LYMPHOCYTES # BLD AUTO: 3.3 % — LOW (ref 13–44)
LYMPHOCYTES # BLD AUTO: 4.3 % — LOW (ref 13–44)
LYMPHOCYTES # BLD AUTO: 4.4 % — LOW (ref 13–44)
LYMPHOCYTES # BLD AUTO: 5 % — LOW (ref 13–44)
LYMPHOCYTES # BLD AUTO: 5.2 % — LOW (ref 13–44)
LYMPHOCYTES # BLD AUTO: 5.9 % — LOW (ref 13–44)
LYMPHOCYTES # BLD AUTO: 6.4 % — LOW (ref 13–44)
LYMPHOCYTES # BLD AUTO: 6.5 % — LOW (ref 13–44)
LYMPHOCYTES # BLD AUTO: 6.7 % — LOW (ref 13–44)
LYMPHOCYTES # BLD AUTO: 8.3 % — LOW (ref 13–44)
LYMPHOCYTES # BLD AUTO: 8.7 % — LOW (ref 13–44)
LYMPHOCYTES # BLD AUTO: 9.2 % — LOW (ref 13–44)
LYMPHOCYTES # BLD AUTO: 9.6 % — LOW (ref 13–44)
LYMPHOCYTES, ABSOLUTE: 892 CELLS/UL — SIGNIFICANT CHANGE UP (ref 850–3900)
MACROCYTES BLD QL: SIGNIFICANT CHANGE UP
MACROCYTES BLD QL: SLIGHT — SIGNIFICANT CHANGE UP
MAGNESIUM SERPL-MCNC: 1.8 MG/DL — SIGNIFICANT CHANGE UP (ref 1.8–2.6)
MAGNESIUM SERPL-MCNC: 1.9 MG/DL — SIGNIFICANT CHANGE UP (ref 1.6–2.6)
MAGNESIUM SERPL-MCNC: 2 MG/DL — SIGNIFICANT CHANGE UP (ref 1.6–2.6)
MAGNESIUM SERPL-MCNC: 2 MG/DL — SIGNIFICANT CHANGE UP (ref 1.6–2.6)
MAGNESIUM SERPL-MCNC: 2 MG/DL — SIGNIFICANT CHANGE UP (ref 1.8–2.6)
MAGNESIUM SERPL-MCNC: 2 MG/DL — SIGNIFICANT CHANGE UP (ref 1.8–2.6)
MAGNESIUM SERPL-MCNC: 2.1 MG/DL — SIGNIFICANT CHANGE UP (ref 1.6–2.6)
MAGNESIUM SERPL-MCNC: 2.1 MG/DL — SIGNIFICANT CHANGE UP (ref 1.6–2.6)
MAGNESIUM SERPL-MCNC: 2.1 MG/DL — SIGNIFICANT CHANGE UP (ref 1.8–2.6)
MAGNESIUM SERPL-MCNC: 2.2 MG/DL — SIGNIFICANT CHANGE UP (ref 1.6–2.6)
MAGNESIUM SERPL-MCNC: 2.2 MG/DL — SIGNIFICANT CHANGE UP (ref 1.8–2.6)
MAGNESIUM SERPL-MCNC: 2.3 MG/DL — SIGNIFICANT CHANGE UP (ref 1.6–2.6)
MAGNESIUM SERPL-MCNC: 2.4 MG/DL — SIGNIFICANT CHANGE UP (ref 1.6–2.6)
MAGNESIUM SERPL-MCNC: 2.4 MG/DL — SIGNIFICANT CHANGE UP (ref 1.6–2.6)
MAGNESIUM SERPL-MCNC: 2.4 MG/DL — SIGNIFICANT CHANGE UP (ref 1.8–2.6)
MAGNESIUM SERPL-MCNC: 2.4 MG/DL — SIGNIFICANT CHANGE UP (ref 1.8–2.6)
MAGNESIUM SERPL-MCNC: 2.5 MG/DL — SIGNIFICANT CHANGE UP (ref 1.6–2.6)
MAGNESIUM SERPL-MCNC: 2.6 MG/DL — SIGNIFICANT CHANGE UP (ref 1.6–2.6)
MAGNESIUM SERPL-MCNC: 2.7 MG/DL — HIGH (ref 1.6–2.6)
MAGNESIUM SERPL-MCNC: 2.7 MG/DL — HIGH (ref 1.8–2.6)
MAGNESIUM SERPL-MCNC: 2.8 MG/DL — HIGH (ref 1.6–2.6)
MAGNESIUM SERPL-MCNC: 2.8 MG/DL — HIGH (ref 1.6–2.6)
MAGNESIUM SERPL-MCNC: 2.9 MG/DL — HIGH (ref 1.6–2.6)
MAGNESIUM SERPL-MCNC: 2.9 MG/DL — HIGH (ref 1.6–2.6)
MAGNESIUM SERPL-MCNC: 3 MG/DL — HIGH (ref 1.6–2.6)
MAGNESIUM SERPL-MCNC: 3 MG/DL — HIGH (ref 1.6–2.6)
MAGNESIUM SERPL-MCNC: 3.1 MG/DL — HIGH (ref 1.6–2.6)
MAGNESIUM SERPL-MCNC: 3.2 MG/DL — HIGH (ref 1.6–2.6)
MAGNESIUM SERPL-MCNC: 3.2 MG/DL — HIGH (ref 1.6–2.6)
MAGNESIUM SERPL-MCNC: 3.3 MG/DL — HIGH (ref 1.6–2.6)
MAGNESIUM SERPL-MCNC: 3.3 MG/DL — HIGH (ref 1.6–2.6)
MANUAL SMEAR VERIFICATION: SIGNIFICANT CHANGE UP
MCHC RBC-ENTMCNC: 28.3 GM/DL — LOW (ref 32–36)
MCHC RBC-ENTMCNC: 28.4 GM/DL — LOW (ref 32–36)
MCHC RBC-ENTMCNC: 28.5 GM/DL — LOW (ref 32–36)
MCHC RBC-ENTMCNC: 28.7 GM/DL — LOW (ref 32–36)
MCHC RBC-ENTMCNC: 28.8 GM/DL — LOW (ref 32–36)
MCHC RBC-ENTMCNC: 28.8 GM/DL — LOW (ref 32–36)
MCHC RBC-ENTMCNC: 28.9 GM/DL — LOW (ref 32–36)
MCHC RBC-ENTMCNC: 29.1 PG — SIGNIFICANT CHANGE UP (ref 27–34)
MCHC RBC-ENTMCNC: 29.3 GM/DL — LOW (ref 32–36)
MCHC RBC-ENTMCNC: 29.3 GM/DL — LOW (ref 32–36)
MCHC RBC-ENTMCNC: 29.4 GM/DL — LOW (ref 32–36)
MCHC RBC-ENTMCNC: 29.4 GM/DL — LOW (ref 32–36)
MCHC RBC-ENTMCNC: 29.4 PG — SIGNIFICANT CHANGE UP (ref 27–34)
MCHC RBC-ENTMCNC: 29.5 GM/DL — LOW (ref 32–36)
MCHC RBC-ENTMCNC: 29.5 GM/DL — LOW (ref 32–36)
MCHC RBC-ENTMCNC: 29.5 PG — SIGNIFICANT CHANGE UP (ref 27–34)
MCHC RBC-ENTMCNC: 29.6 PG — SIGNIFICANT CHANGE UP (ref 27–34)
MCHC RBC-ENTMCNC: 29.7 PG — SIGNIFICANT CHANGE UP (ref 27–34)
MCHC RBC-ENTMCNC: 29.7 PG — SIGNIFICANT CHANGE UP (ref 27–34)
MCHC RBC-ENTMCNC: 29.8 GM/DL — LOW (ref 32–36)
MCHC RBC-ENTMCNC: 29.8 GM/DL — LOW (ref 32–36)
MCHC RBC-ENTMCNC: 29.8 PG — SIGNIFICANT CHANGE UP (ref 27–34)
MCHC RBC-ENTMCNC: 29.8 PG — SIGNIFICANT CHANGE UP (ref 27–34)
MCHC RBC-ENTMCNC: 29.9 GM/DL — LOW (ref 32–36)
MCHC RBC-ENTMCNC: 29.9 PG — SIGNIFICANT CHANGE UP (ref 27–34)
MCHC RBC-ENTMCNC: 30 GM/DL — LOW (ref 32–36)
MCHC RBC-ENTMCNC: 30 GM/DL — LOW (ref 32–36)
MCHC RBC-ENTMCNC: 30 PG — SIGNIFICANT CHANGE UP (ref 27–34)
MCHC RBC-ENTMCNC: 30.1 GM/DL — LOW (ref 32–36)
MCHC RBC-ENTMCNC: 30.1 PG — SIGNIFICANT CHANGE UP (ref 27–34)
MCHC RBC-ENTMCNC: 30.2 GM/DL — LOW (ref 32–36)
MCHC RBC-ENTMCNC: 30.2 GM/DL — LOW (ref 32–36)
MCHC RBC-ENTMCNC: 30.3 GM/DL — LOW (ref 32–36)
MCHC RBC-ENTMCNC: 30.3 GM/DL — LOW (ref 32–36)
MCHC RBC-ENTMCNC: 30.3 PG — SIGNIFICANT CHANGE UP (ref 27–34)
MCHC RBC-ENTMCNC: 30.4 GM/DL — LOW (ref 32–36)
MCHC RBC-ENTMCNC: 30.4 PG — SIGNIFICANT CHANGE UP (ref 27–34)
MCHC RBC-ENTMCNC: 30.5 GM/DL — LOW (ref 32–36)
MCHC RBC-ENTMCNC: 30.5 GM/DL — LOW (ref 32–36)
MCHC RBC-ENTMCNC: 30.5 PG — SIGNIFICANT CHANGE UP (ref 27–34)
MCHC RBC-ENTMCNC: 30.6 GM/DL — LOW (ref 32–36)
MCHC RBC-ENTMCNC: 30.6 PG — SIGNIFICANT CHANGE UP (ref 27–34)
MCHC RBC-ENTMCNC: 30.7 GM/DL — LOW (ref 32–36)
MCHC RBC-ENTMCNC: 30.7 PG — SIGNIFICANT CHANGE UP (ref 27–34)
MCHC RBC-ENTMCNC: 30.9 GM/DL — LOW (ref 32–36)
MCHC RBC-ENTMCNC: 31 PG — SIGNIFICANT CHANGE UP (ref 27–34)
MCHC RBC-ENTMCNC: 31.2 GM/DL — LOW (ref 32–36)
MCHC RBC-ENTMCNC: 31.3 PG — SIGNIFICANT CHANGE UP (ref 27–34)
MCHC RBC-ENTMCNC: 31.7 GM/DL — LOW (ref 32–36)
MCHC RBC-ENTMCNC: 31.8 GM/DL — LOW (ref 32–36)
MCHC RBC-ENTMCNC: 31.9 GM/DL — LOW (ref 32–36)
MCHC RBC-ENTMCNC: 32.2 GM/DL — SIGNIFICANT CHANGE UP (ref 32–36)
MCHC RBC-ENTMCNC: 32.3 GM/DL — SIGNIFICANT CHANGE UP (ref 32–36)
MCHC RBC-ENTMCNC: 32.4 GM/DL — SIGNIFICANT CHANGE UP (ref 32–36)
MCHC RBC-ENTMCNC: 32.5 GM/DL — SIGNIFICANT CHANGE UP (ref 32–36)
MCHC RBC-ENTMCNC: 32.6 GM/DL — SIGNIFICANT CHANGE UP (ref 32–36)
MCHC RBC-ENTMCNC: 32.7 GM/DL — SIGNIFICANT CHANGE UP (ref 32–36)
MCHC RBC-ENTMCNC: 33 GM/DL — SIGNIFICANT CHANGE UP (ref 32–36)
MCHC RBC-ENTMCNC: 33.2 GM/DL — SIGNIFICANT CHANGE UP (ref 32–36)
MCV RBC AUTO: 100.3 FL — HIGH (ref 80–100)
MCV RBC AUTO: 100.3 FL — HIGH (ref 80–100)
MCV RBC AUTO: 100.9 FL — HIGH (ref 80–100)
MCV RBC AUTO: 101 FL — HIGH (ref 80–100)
MCV RBC AUTO: 101.4 FL — HIGH (ref 80–100)
MCV RBC AUTO: 101.5 FL — HIGH (ref 80–100)
MCV RBC AUTO: 101.6 FL — HIGH (ref 80–100)
MCV RBC AUTO: 101.7 FL — HIGH (ref 80–100)
MCV RBC AUTO: 102.1 FL — HIGH (ref 80–100)
MCV RBC AUTO: 102.9 FL — HIGH (ref 80–100)
MCV RBC AUTO: 103.1 FL — HIGH (ref 80–100)
MCV RBC AUTO: 103.3 FL — HIGH (ref 80–100)
MCV RBC AUTO: 104.1 FL — HIGH (ref 80–100)
MCV RBC AUTO: 107.2 FL — HIGH (ref 80–100)
MCV RBC AUTO: 107.2 FL — HIGH (ref 80–100)
MCV RBC AUTO: 91.6 FL — SIGNIFICANT CHANGE UP (ref 80–100)
MCV RBC AUTO: 92 FL — SIGNIFICANT CHANGE UP (ref 80–100)
MCV RBC AUTO: 92.1 FL — SIGNIFICANT CHANGE UP (ref 80–100)
MCV RBC AUTO: 92.2 FL — SIGNIFICANT CHANGE UP (ref 80–100)
MCV RBC AUTO: 92.5 FL — SIGNIFICANT CHANGE UP (ref 80–100)
MCV RBC AUTO: 92.7 FL — SIGNIFICANT CHANGE UP (ref 80–100)
MCV RBC AUTO: 93 FL — SIGNIFICANT CHANGE UP (ref 80–100)
MCV RBC AUTO: 93.1 FL — SIGNIFICANT CHANGE UP (ref 80–100)
MCV RBC AUTO: 93.2 FL — SIGNIFICANT CHANGE UP (ref 80–100)
MCV RBC AUTO: 94.1 FL — SIGNIFICANT CHANGE UP (ref 80–100)
MCV RBC AUTO: 97.2 FL — SIGNIFICANT CHANGE UP (ref 80–100)
MCV RBC AUTO: 97.2 FL — SIGNIFICANT CHANGE UP (ref 80–100)
MCV RBC AUTO: 97.3 FL — SIGNIFICANT CHANGE UP (ref 80–100)
MCV RBC AUTO: 97.3 FL — SIGNIFICANT CHANGE UP (ref 80–100)
MCV RBC AUTO: 97.7 FL — SIGNIFICANT CHANGE UP (ref 80–100)
MCV RBC AUTO: 97.8 FL — SIGNIFICANT CHANGE UP (ref 80–100)
MCV RBC AUTO: 97.9 FL — SIGNIFICANT CHANGE UP (ref 80–100)
MCV RBC AUTO: 98 FL — SIGNIFICANT CHANGE UP (ref 80–100)
MCV RBC AUTO: 98.2 FL — SIGNIFICANT CHANGE UP (ref 80–100)
MCV RBC AUTO: 98.3 FL — SIGNIFICANT CHANGE UP (ref 80–100)
MCV RBC AUTO: 98.3 FL — SIGNIFICANT CHANGE UP (ref 80–100)
MCV RBC AUTO: 99.1 FL — SIGNIFICANT CHANGE UP (ref 80–100)
MCV RBC AUTO: 99.1 FL — SIGNIFICANT CHANGE UP (ref 80–100)
MCV RBC AUTO: 99.3 FL — SIGNIFICANT CHANGE UP (ref 80–100)
METAMYELOCYTES # FLD: 0.8 % — HIGH (ref 0–0)
METAMYELOCYTES # FLD: 0.9 % — HIGH (ref 0–0)
METHOD TYPE: SIGNIFICANT CHANGE UP
MICROCYTES BLD QL: SLIGHT — SIGNIFICANT CHANGE UP
MONOCYTES # BLD AUTO: 0.07 K/UL — SIGNIFICANT CHANGE UP (ref 0–0.9)
MONOCYTES # BLD AUTO: 0.08 K/UL — SIGNIFICANT CHANGE UP (ref 0–0.9)
MONOCYTES # BLD AUTO: 0.27 K/UL — SIGNIFICANT CHANGE UP (ref 0–0.9)
MONOCYTES # BLD AUTO: 0.29 K/UL — SIGNIFICANT CHANGE UP (ref 0–0.9)
MONOCYTES # BLD AUTO: 0.29 K/UL — SIGNIFICANT CHANGE UP (ref 0–0.9)
MONOCYTES # BLD AUTO: 0.32 K/UL — SIGNIFICANT CHANGE UP (ref 0–0.9)
MONOCYTES # BLD AUTO: 0.32 K/UL — SIGNIFICANT CHANGE UP (ref 0–0.9)
MONOCYTES # BLD AUTO: 0.33 K/UL — SIGNIFICANT CHANGE UP (ref 0–0.9)
MONOCYTES # BLD AUTO: 0.34 K/UL — SIGNIFICANT CHANGE UP (ref 0–0.9)
MONOCYTES # BLD AUTO: 0.35 K/UL — SIGNIFICANT CHANGE UP (ref 0–0.9)
MONOCYTES # BLD AUTO: 0.35 K/UL — SIGNIFICANT CHANGE UP (ref 0–0.9)
MONOCYTES # BLD AUTO: 0.4 K/UL — SIGNIFICANT CHANGE UP (ref 0–0.9)
MONOCYTES # BLD AUTO: 0.4 K/UL — SIGNIFICANT CHANGE UP (ref 0–0.9)
MONOCYTES # BLD AUTO: 0.41 K/UL — SIGNIFICANT CHANGE UP (ref 0–0.9)
MONOCYTES # BLD AUTO: 0.44 K/UL — SIGNIFICANT CHANGE UP (ref 0–0.9)
MONOCYTES # BLD AUTO: 0.45 K/UL — SIGNIFICANT CHANGE UP (ref 0–0.9)
MONOCYTES # BLD AUTO: 0.47 K/UL — SIGNIFICANT CHANGE UP (ref 0–0.9)
MONOCYTES # BLD AUTO: 0.48 K/UL — SIGNIFICANT CHANGE UP (ref 0–0.9)
MONOCYTES # BLD AUTO: 0.48 K/UL — SIGNIFICANT CHANGE UP (ref 0–0.9)
MONOCYTES # BLD AUTO: 0.49 K/UL — SIGNIFICANT CHANGE UP (ref 0–0.9)
MONOCYTES # BLD AUTO: 0.5 K/UL — SIGNIFICANT CHANGE UP (ref 0–0.9)
MONOCYTES # BLD AUTO: 0.53 K/UL — SIGNIFICANT CHANGE UP (ref 0–0.9)
MONOCYTES # BLD AUTO: 0.54 K/UL — SIGNIFICANT CHANGE UP (ref 0–0.9)
MONOCYTES # BLD AUTO: 0.54 K/UL — SIGNIFICANT CHANGE UP (ref 0–0.9)
MONOCYTES # BLD AUTO: 0.55 K/UL — SIGNIFICANT CHANGE UP (ref 0–0.9)
MONOCYTES # BLD AUTO: 0.57 K/UL — SIGNIFICANT CHANGE UP (ref 0–0.9)
MONOCYTES # BLD AUTO: 0.59 K/UL — SIGNIFICANT CHANGE UP (ref 0–0.9)
MONOCYTES # BLD AUTO: 0.59 K/UL — SIGNIFICANT CHANGE UP (ref 0–0.9)
MONOCYTES # BLD AUTO: 0.6 K/UL — SIGNIFICANT CHANGE UP (ref 0–0.9)
MONOCYTES # BLD AUTO: 0.6 K/UL — SIGNIFICANT CHANGE UP (ref 0–0.9)
MONOCYTES # BLD AUTO: 0.63 K/UL — SIGNIFICANT CHANGE UP (ref 0–0.9)
MONOCYTES # BLD AUTO: 0.64 K/UL — SIGNIFICANT CHANGE UP (ref 0–0.9)
MONOCYTES # BLD AUTO: 0.64 K/UL — SIGNIFICANT CHANGE UP (ref 0–0.9)
MONOCYTES # BLD AUTO: 0.66 K/UL — SIGNIFICANT CHANGE UP (ref 0–0.9)
MONOCYTES # BLD AUTO: 0.7 K/UL — SIGNIFICANT CHANGE UP (ref 0–0.9)
MONOCYTES # BLD AUTO: 0.71 K/UL — SIGNIFICANT CHANGE UP (ref 0–0.9)
MONOCYTES # BLD AUTO: 0.73 K/UL — SIGNIFICANT CHANGE UP (ref 0–0.9)
MONOCYTES # BLD AUTO: 0.76 K/UL — SIGNIFICANT CHANGE UP (ref 0–0.9)
MONOCYTES # BLD AUTO: 0.84 K/UL — SIGNIFICANT CHANGE UP (ref 0–0.9)
MONOCYTES # BLD AUTO: 1.51 K/UL — HIGH (ref 0–0.9)
MONOCYTES NFR BLD AUTO: 0.8 % — LOW (ref 2–14)
MONOCYTES NFR BLD AUTO: 10.5 % — SIGNIFICANT CHANGE UP (ref 2–14)
MONOCYTES NFR BLD AUTO: 10.5 % — SIGNIFICANT CHANGE UP (ref 2–14)
MONOCYTES NFR BLD AUTO: 12.3 % — SIGNIFICANT CHANGE UP (ref 2–14)
MONOCYTES NFR BLD AUTO: 2.4 % — SIGNIFICANT CHANGE UP (ref 2–14)
MONOCYTES NFR BLD AUTO: 3.5 % — SIGNIFICANT CHANGE UP (ref 2–14)
MONOCYTES NFR BLD AUTO: 3.5 % — SIGNIFICANT CHANGE UP (ref 2–14)
MONOCYTES NFR BLD AUTO: 4.3 % — SIGNIFICANT CHANGE UP (ref 2–14)
MONOCYTES NFR BLD AUTO: 4.8 % — SIGNIFICANT CHANGE UP (ref 2–14)
MONOCYTES NFR BLD AUTO: 4.9 % — SIGNIFICANT CHANGE UP (ref 2–14)
MONOCYTES NFR BLD AUTO: 5.1 % — SIGNIFICANT CHANGE UP (ref 2–14)
MONOCYTES NFR BLD AUTO: 5.2 % — SIGNIFICANT CHANGE UP (ref 2–14)
MONOCYTES NFR BLD AUTO: 5.2 % — SIGNIFICANT CHANGE UP (ref 2–14)
MONOCYTES NFR BLD AUTO: 5.5 % — SIGNIFICANT CHANGE UP (ref 2–14)
MONOCYTES NFR BLD AUTO: 6 % — SIGNIFICANT CHANGE UP (ref 2–14)
MONOCYTES NFR BLD AUTO: 6.1 % — SIGNIFICANT CHANGE UP (ref 2–14)
MONOCYTES NFR BLD AUTO: 6.2 % — SIGNIFICANT CHANGE UP (ref 2–14)
MONOCYTES NFR BLD AUTO: 6.3 % — SIGNIFICANT CHANGE UP (ref 2–14)
MONOCYTES NFR BLD AUTO: 6.3 % — SIGNIFICANT CHANGE UP (ref 2–14)
MONOCYTES NFR BLD AUTO: 6.8 % — SIGNIFICANT CHANGE UP (ref 2–14)
MONOCYTES NFR BLD AUTO: 6.8 % — SIGNIFICANT CHANGE UP (ref 2–14)
MONOCYTES NFR BLD AUTO: 6.9 % — SIGNIFICANT CHANGE UP (ref 2–14)
MONOCYTES NFR BLD AUTO: 7 % — SIGNIFICANT CHANGE UP (ref 2–14)
MONOCYTES NFR BLD AUTO: 7 % — SIGNIFICANT CHANGE UP (ref 2–14)
MONOCYTES NFR BLD AUTO: 7.1 % — SIGNIFICANT CHANGE UP (ref 2–14)
MONOCYTES NFR BLD AUTO: 7.3 % — SIGNIFICANT CHANGE UP (ref 2–14)
MONOCYTES NFR BLD AUTO: 7.3 % — SIGNIFICANT CHANGE UP (ref 2–14)
MONOCYTES NFR BLD AUTO: 7.5 % — SIGNIFICANT CHANGE UP (ref 2–14)
MONOCYTES NFR BLD AUTO: 7.5 % — SIGNIFICANT CHANGE UP (ref 2–14)
MONOCYTES NFR BLD AUTO: 7.6 % — SIGNIFICANT CHANGE UP (ref 2–14)
MONOCYTES NFR BLD AUTO: 7.8 % — SIGNIFICANT CHANGE UP (ref 2–14)
MONOCYTES NFR BLD AUTO: 8 % — SIGNIFICANT CHANGE UP (ref 2–14)
MONOCYTES NFR BLD AUTO: 8 % — SIGNIFICANT CHANGE UP (ref 2–14)
MONOCYTES NFR BLD AUTO: 8.1 % — SIGNIFICANT CHANGE UP (ref 2–14)
MONOCYTES NFR BLD AUTO: 8.2 % — SIGNIFICANT CHANGE UP (ref 2–14)
MONOCYTES NFR BLD AUTO: 8.3 % — SIGNIFICANT CHANGE UP (ref 2–14)
MONOCYTES NFR BLD AUTO: 8.4 % — SIGNIFICANT CHANGE UP (ref 2–14)
MONOCYTES NFR BLD AUTO: 8.6 % — SIGNIFICANT CHANGE UP (ref 2–14)
MONOCYTES NFR BLD AUTO: 8.6 % — SIGNIFICANT CHANGE UP (ref 2–14)
MONOCYTES NFR BLD AUTO: 9 % — SIGNIFICANT CHANGE UP (ref 2–14)
MYELOCYTES NFR BLD: 0.9 % — HIGH (ref 0–0)
MYELOCYTES NFR BLD: 1.7 % — HIGH (ref 0–0)
MYELOCYTES NFR BLD: 1.7 % — HIGH (ref 0–0)
NEUTROPHILS # BLD AUTO: 10.77 K/UL — HIGH (ref 1.8–7.4)
NEUTROPHILS # BLD AUTO: 11.63 K/UL — HIGH (ref 1.8–7.4)
NEUTROPHILS # BLD AUTO: 13.41 K/UL — HIGH (ref 1.8–7.4)
NEUTROPHILS # BLD AUTO: 2.48 K/UL — SIGNIFICANT CHANGE UP (ref 1.8–7.4)
NEUTROPHILS # BLD AUTO: 2.51 K/UL — SIGNIFICANT CHANGE UP (ref 1.8–7.4)
NEUTROPHILS # BLD AUTO: 2.77 K/UL — SIGNIFICANT CHANGE UP (ref 1.8–7.4)
NEUTROPHILS # BLD AUTO: 2.88 K/UL — SIGNIFICANT CHANGE UP (ref 1.8–7.4)
NEUTROPHILS # BLD AUTO: 3.52 K/UL — SIGNIFICANT CHANGE UP (ref 1.8–7.4)
NEUTROPHILS # BLD AUTO: 3.76 K/UL — SIGNIFICANT CHANGE UP (ref 1.8–7.4)
NEUTROPHILS # BLD AUTO: 3.92 K/UL — SIGNIFICANT CHANGE UP (ref 1.8–7.4)
NEUTROPHILS # BLD AUTO: 4.05 K/UL — SIGNIFICANT CHANGE UP (ref 1.8–7.4)
NEUTROPHILS # BLD AUTO: 4.09 K/UL — SIGNIFICANT CHANGE UP (ref 1.8–7.4)
NEUTROPHILS # BLD AUTO: 4.19 K/UL — SIGNIFICANT CHANGE UP (ref 1.8–7.4)
NEUTROPHILS # BLD AUTO: 4.39 K/UL — SIGNIFICANT CHANGE UP (ref 1.8–7.4)
NEUTROPHILS # BLD AUTO: 4.61 K/UL — SIGNIFICANT CHANGE UP (ref 1.8–7.4)
NEUTROPHILS # BLD AUTO: 4.63 K/UL — SIGNIFICANT CHANGE UP (ref 1.8–7.4)
NEUTROPHILS # BLD AUTO: 5 K/UL — SIGNIFICANT CHANGE UP (ref 1.8–7.4)
NEUTROPHILS # BLD AUTO: 5.06 K/UL — SIGNIFICANT CHANGE UP (ref 1.8–7.4)
NEUTROPHILS # BLD AUTO: 5.11 K/UL — SIGNIFICANT CHANGE UP (ref 1.8–7.4)
NEUTROPHILS # BLD AUTO: 5.25 K/UL — SIGNIFICANT CHANGE UP (ref 1.8–7.4)
NEUTROPHILS # BLD AUTO: 5.29 K/UL — SIGNIFICANT CHANGE UP (ref 1.8–7.4)
NEUTROPHILS # BLD AUTO: 5.42 K/UL — SIGNIFICANT CHANGE UP (ref 1.8–7.4)
NEUTROPHILS # BLD AUTO: 5.88 K/UL — SIGNIFICANT CHANGE UP (ref 1.8–7.4)
NEUTROPHILS # BLD AUTO: 5.99 K/UL — SIGNIFICANT CHANGE UP (ref 1.8–7.4)
NEUTROPHILS # BLD AUTO: 6.14 K/UL — SIGNIFICANT CHANGE UP (ref 1.8–7.4)
NEUTROPHILS # BLD AUTO: 6.35 K/UL — SIGNIFICANT CHANGE UP (ref 1.8–7.4)
NEUTROPHILS # BLD AUTO: 6.39 K/UL — SIGNIFICANT CHANGE UP (ref 1.8–7.4)
NEUTROPHILS # BLD AUTO: 6.55 K/UL — SIGNIFICANT CHANGE UP (ref 1.8–7.4)
NEUTROPHILS # BLD AUTO: 6.58 K/UL — SIGNIFICANT CHANGE UP (ref 1.8–7.4)
NEUTROPHILS # BLD AUTO: 6.71 K/UL — SIGNIFICANT CHANGE UP (ref 1.8–7.4)
NEUTROPHILS # BLD AUTO: 7.29 K/UL — SIGNIFICANT CHANGE UP (ref 1.8–7.4)
NEUTROPHILS # BLD AUTO: 7.33 K/UL — SIGNIFICANT CHANGE UP (ref 1.8–7.4)
NEUTROPHILS # BLD AUTO: 7.37 K/UL — SIGNIFICANT CHANGE UP (ref 1.8–7.4)
NEUTROPHILS # BLD AUTO: 7.65 K/UL — HIGH (ref 1.8–7.4)
NEUTROPHILS # BLD AUTO: 7.77 K/UL — HIGH (ref 1.8–7.4)
NEUTROPHILS # BLD AUTO: 7.87 K/UL — HIGH (ref 1.8–7.4)
NEUTROPHILS # BLD AUTO: 8.49 K/UL — HIGH (ref 1.8–7.4)
NEUTROPHILS # BLD AUTO: 8.72 K/UL — HIGH (ref 1.8–7.4)
NEUTROPHILS # BLD AUTO: 9.06 K/UL — HIGH (ref 1.8–7.4)
NEUTROPHILS # BLD AUTO: 9.92 K/UL — HIGH (ref 1.8–7.4)
NEUTROPHILS NFR BLD AUTO: 55.5 % — SIGNIFICANT CHANGE UP (ref 43–77)
NEUTROPHILS NFR BLD AUTO: 62.5 % — SIGNIFICANT CHANGE UP (ref 43–77)
NEUTROPHILS NFR BLD AUTO: 62.6 % — SIGNIFICANT CHANGE UP (ref 43–77)
NEUTROPHILS NFR BLD AUTO: 66.9 % — SIGNIFICANT CHANGE UP (ref 43–77)
NEUTROPHILS NFR BLD AUTO: 68.2 % — SIGNIFICANT CHANGE UP (ref 43–77)
NEUTROPHILS NFR BLD AUTO: 69.7 % — SIGNIFICANT CHANGE UP (ref 43–77)
NEUTROPHILS NFR BLD AUTO: 69.9 % — SIGNIFICANT CHANGE UP (ref 43–77)
NEUTROPHILS NFR BLD AUTO: 71.1 % — SIGNIFICANT CHANGE UP (ref 43–77)
NEUTROPHILS NFR BLD AUTO: 72 % — SIGNIFICANT CHANGE UP (ref 43–77)
NEUTROPHILS NFR BLD AUTO: 72.4 % — SIGNIFICANT CHANGE UP (ref 43–77)
NEUTROPHILS NFR BLD AUTO: 73.9 % — SIGNIFICANT CHANGE UP (ref 43–77)
NEUTROPHILS NFR BLD AUTO: 74.3 % — SIGNIFICANT CHANGE UP (ref 43–77)
NEUTROPHILS NFR BLD AUTO: 74.7 % — SIGNIFICANT CHANGE UP (ref 43–77)
NEUTROPHILS NFR BLD AUTO: 75.3 % — SIGNIFICANT CHANGE UP (ref 43–77)
NEUTROPHILS NFR BLD AUTO: 75.7 % — SIGNIFICANT CHANGE UP (ref 43–77)
NEUTROPHILS NFR BLD AUTO: 76 % — SIGNIFICANT CHANGE UP (ref 43–77)
NEUTROPHILS NFR BLD AUTO: 78.1 % — HIGH (ref 43–77)
NEUTROPHILS NFR BLD AUTO: 78.3 % — HIGH (ref 43–77)
NEUTROPHILS NFR BLD AUTO: 78.4 % — HIGH (ref 43–77)
NEUTROPHILS NFR BLD AUTO: 79.3 % — HIGH (ref 43–77)
NEUTROPHILS NFR BLD AUTO: 79.8 % — HIGH (ref 43–77)
NEUTROPHILS NFR BLD AUTO: 79.9 % — HIGH (ref 43–77)
NEUTROPHILS NFR BLD AUTO: 80.4 % — HIGH (ref 43–77)
NEUTROPHILS NFR BLD AUTO: 80.7 % — HIGH (ref 43–77)
NEUTROPHILS NFR BLD AUTO: 81.4 % — HIGH (ref 43–77)
NEUTROPHILS NFR BLD AUTO: 81.5 % — HIGH (ref 43–77)
NEUTROPHILS NFR BLD AUTO: 84.5 % — HIGH (ref 43–77)
NEUTROPHILS NFR BLD AUTO: 85.4 % — HIGH (ref 43–77)
NEUTROPHILS NFR BLD AUTO: 86.6 % — HIGH (ref 43–77)
NEUTROPHILS NFR BLD AUTO: 86.6 % — HIGH (ref 43–77)
NEUTROPHILS NFR BLD AUTO: 87 % — HIGH (ref 43–77)
NEUTROPHILS NFR BLD AUTO: 87 % — HIGH (ref 43–77)
NEUTROPHILS NFR BLD AUTO: 87.6 % — HIGH (ref 43–77)
NEUTROPHILS NFR BLD AUTO: 88.3 % — HIGH (ref 43–77)
NEUTROPHILS NFR BLD AUTO: 88.6 % — HIGH (ref 43–77)
NEUTROPHILS NFR BLD AUTO: 90.4 % — HIGH (ref 43–77)
NEUTROPHILS NFR BLD AUTO: 91.2 % — HIGH (ref 43–77)
NEUTROPHILS NFR BLD AUTO: 91.8 % — HIGH (ref 43–77)
NEUTS BAND # BLD: 0.9 % — SIGNIFICANT CHANGE UP (ref 0–8)
NEUTS BAND # BLD: 15.9 % — HIGH (ref 0–8)
NEUTS BAND # BLD: 4.3 % — SIGNIFICANT CHANGE UP (ref 0–8)
NEUTS BAND # BLD: 5.3 % — SIGNIFICANT CHANGE UP (ref 0–8)
NITRITE UR-MCNC: NEGATIVE — SIGNIFICANT CHANGE UP
NRBC # BLD: 1 /100 WBCS — HIGH (ref 0–0)
NRBC # BLD: 1 /100 WBCS — HIGH (ref 0–0)
NRBC # BLD: 1 /100 — HIGH (ref 0–0)
NRBC # BLD: 2 /100 WBCS — HIGH (ref 0–0)
NRBC # BLD: 3 /100 WBCS — HIGH (ref 0–0)
NRBC # BLD: 3 /100 — HIGH (ref 0–0)
NRBC # BLD: 4 /100 WBCS — HIGH (ref 0–0)
NT-PROBNP SERPL-SCNC: 458 PG/ML — HIGH (ref 0–300)
ORGANISM # SPEC MICROSCOPIC CNT: SIGNIFICANT CHANGE UP
OVALOCYTES BLD QL SMEAR: SLIGHT — SIGNIFICANT CHANGE UP
PCO2 BLDA: 42 MMHG — SIGNIFICANT CHANGE UP (ref 35–45)
PCO2 BLDA: 43 MMHG — SIGNIFICANT CHANGE UP (ref 35–45)
PCO2 BLDA: 46 MMHG — HIGH (ref 35–45)
PCO2 BLDA: 59 MMHG — HIGH (ref 35–45)
PH BLDA: 7.27 — LOW (ref 7.35–7.45)
PH BLDA: 7.37 — SIGNIFICANT CHANGE UP (ref 7.35–7.45)
PH BLDA: 7.44 — SIGNIFICANT CHANGE UP (ref 7.35–7.45)
PH BLDA: 7.48 — HIGH (ref 7.35–7.45)
PH UR: 5 — SIGNIFICANT CHANGE UP (ref 5–8)
PHOSPHATE SERPL-MCNC: 1.9 MG/DL — LOW (ref 2.4–4.7)
PHOSPHATE SERPL-MCNC: 2.7 MG/DL — SIGNIFICANT CHANGE UP (ref 2.4–4.7)
PHOSPHATE SERPL-MCNC: 2.8 MG/DL — SIGNIFICANT CHANGE UP (ref 2.4–4.7)
PHOSPHATE SERPL-MCNC: 3.1 MG/DL — SIGNIFICANT CHANGE UP (ref 2.4–4.7)
PHOSPHATE SERPL-MCNC: 3.2 MG/DL — SIGNIFICANT CHANGE UP (ref 2.4–4.7)
PHOSPHATE SERPL-MCNC: 3.3 MG/DL — SIGNIFICANT CHANGE UP (ref 2.4–4.7)
PHOSPHATE SERPL-MCNC: 3.3 MG/DL — SIGNIFICANT CHANGE UP (ref 2.4–4.7)
PHOSPHATE SERPL-MCNC: 3.4 MG/DL — SIGNIFICANT CHANGE UP (ref 2.4–4.7)
PHOSPHATE SERPL-MCNC: 3.5 MG/DL — SIGNIFICANT CHANGE UP (ref 2.4–4.7)
PHOSPHATE SERPL-MCNC: 3.5 MG/DL — SIGNIFICANT CHANGE UP (ref 2.4–4.7)
PHOSPHATE SERPL-MCNC: 3.7 MG/DL — SIGNIFICANT CHANGE UP (ref 2.4–4.7)
PHOSPHATE SERPL-MCNC: 3.8 MG/DL — SIGNIFICANT CHANGE UP (ref 2.4–4.7)
PHOSPHATE SERPL-MCNC: 3.9 MG/DL — SIGNIFICANT CHANGE UP (ref 2.4–4.7)
PHOSPHATE SERPL-MCNC: 3.9 MG/DL — SIGNIFICANT CHANGE UP (ref 2.4–4.7)
PHOSPHATE SERPL-MCNC: 4 MG/DL — SIGNIFICANT CHANGE UP (ref 2.4–4.7)
PHOSPHATE SERPL-MCNC: 4 MG/DL — SIGNIFICANT CHANGE UP (ref 2.4–4.7)
PHOSPHATE SERPL-MCNC: 4.3 MG/DL — SIGNIFICANT CHANGE UP (ref 2.4–4.7)
PHOSPHATE SERPL-MCNC: 4.3 MG/DL — SIGNIFICANT CHANGE UP (ref 2.4–4.7)
PHOSPHATE SERPL-MCNC: 4.4 MG/DL — SIGNIFICANT CHANGE UP (ref 2.4–4.7)
PHOSPHATE SERPL-MCNC: 4.5 MG/DL — SIGNIFICANT CHANGE UP (ref 2.4–4.7)
PHOSPHATE SERPL-MCNC: 4.9 MG/DL — HIGH (ref 2.4–4.7)
PHOSPHATE SERPL-MCNC: 5.1 MG/DL — HIGH (ref 2.4–4.7)
PHOSPHATE SERPL-MCNC: 5.2 MG/DL — HIGH (ref 2.4–4.7)
PHOSPHATE SERPL-MCNC: 5.4 MG/DL — HIGH (ref 2.4–4.7)
PHOSPHATE SERPL-MCNC: 5.4 MG/DL — HIGH (ref 2.4–4.7)
PHOSPHATE SERPL-MCNC: 6.9 MG/DL — HIGH (ref 2.4–4.7)
PLAT MORPH BLD: NORMAL — SIGNIFICANT CHANGE UP
PLATELET # BLD AUTO: 122 K/UL — LOW (ref 150–400)
PLATELET # BLD AUTO: 131 K/UL — LOW (ref 150–400)
PLATELET # BLD AUTO: 135 K/UL — LOW (ref 150–400)
PLATELET # BLD AUTO: 135 K/UL — LOW (ref 150–400)
PLATELET # BLD AUTO: 140 K/UL — LOW (ref 150–400)
PLATELET # BLD AUTO: 145 K/UL — LOW (ref 150–400)
PLATELET # BLD AUTO: 145 K/UL — LOW (ref 150–400)
PLATELET # BLD AUTO: 155 K/UL — SIGNIFICANT CHANGE UP (ref 150–400)
PLATELET # BLD AUTO: 159 K/UL — SIGNIFICANT CHANGE UP (ref 150–400)
PLATELET # BLD AUTO: 160 K/UL — SIGNIFICANT CHANGE UP (ref 150–400)
PLATELET # BLD AUTO: 163 K/UL — SIGNIFICANT CHANGE UP (ref 150–400)
PLATELET # BLD AUTO: 164 K/UL — SIGNIFICANT CHANGE UP (ref 150–400)
PLATELET # BLD AUTO: 167 K/UL — SIGNIFICANT CHANGE UP (ref 150–400)
PLATELET # BLD AUTO: 170 K/UL — SIGNIFICANT CHANGE UP (ref 150–400)
PLATELET # BLD AUTO: 171 K/UL — SIGNIFICANT CHANGE UP (ref 150–400)
PLATELET # BLD AUTO: 176 K/UL — SIGNIFICANT CHANGE UP (ref 150–400)
PLATELET # BLD AUTO: 177 K/UL — SIGNIFICANT CHANGE UP (ref 150–400)
PLATELET # BLD AUTO: 180 K/UL — SIGNIFICANT CHANGE UP (ref 150–400)
PLATELET # BLD AUTO: 184 K/UL — SIGNIFICANT CHANGE UP (ref 150–400)
PLATELET # BLD AUTO: 187 K/UL — SIGNIFICANT CHANGE UP (ref 150–400)
PLATELET # BLD AUTO: 187 K/UL — SIGNIFICANT CHANGE UP (ref 150–400)
PLATELET # BLD AUTO: 188 K/UL — SIGNIFICANT CHANGE UP (ref 150–400)
PLATELET # BLD AUTO: 191 K/UL — SIGNIFICANT CHANGE UP (ref 150–400)
PLATELET # BLD AUTO: 191 K/UL — SIGNIFICANT CHANGE UP (ref 150–400)
PLATELET # BLD AUTO: 195 K/UL — SIGNIFICANT CHANGE UP (ref 150–400)
PLATELET # BLD AUTO: 214 K/UL — SIGNIFICANT CHANGE UP (ref 150–400)
PLATELET # BLD AUTO: 225 K/UL — SIGNIFICANT CHANGE UP (ref 150–400)
PLATELET # BLD AUTO: 227 K/UL — SIGNIFICANT CHANGE UP (ref 150–400)
PLATELET # BLD AUTO: 237 K/UL — SIGNIFICANT CHANGE UP (ref 150–400)
PLATELET # BLD AUTO: 249 K/UL — SIGNIFICANT CHANGE UP (ref 150–400)
PLATELET # BLD AUTO: 267 K/UL — SIGNIFICANT CHANGE UP (ref 150–400)
PLATELET # BLD AUTO: 271 K/UL — SIGNIFICANT CHANGE UP (ref 150–400)
PLATELET # BLD AUTO: 274 K/UL — SIGNIFICANT CHANGE UP (ref 150–400)
PLATELET # BLD AUTO: 275 K/UL — SIGNIFICANT CHANGE UP (ref 150–400)
PLATELET # BLD AUTO: 277 K/UL — SIGNIFICANT CHANGE UP (ref 150–400)
PLATELET # BLD AUTO: 289 K/UL — SIGNIFICANT CHANGE UP (ref 150–400)
PLATELET # BLD AUTO: 291 K/UL — SIGNIFICANT CHANGE UP (ref 150–400)
PLATELET # BLD AUTO: 294 K/UL — SIGNIFICANT CHANGE UP (ref 150–400)
PLATELET # BLD AUTO: 309 K/UL — SIGNIFICANT CHANGE UP (ref 150–400)
PLATELET # BLD AUTO: 317 K/UL — SIGNIFICANT CHANGE UP (ref 150–400)
PLATELET # BLD AUTO: 370 K/UL — SIGNIFICANT CHANGE UP (ref 150–400)
PLATELET COUNT - ESTIMATE: NORMAL — SIGNIFICANT CHANGE UP
PO2 BLDA: 65 MMHG — LOW (ref 83–108)
PO2 BLDA: 72 MMHG — LOW (ref 83–108)
PO2 BLDA: 91 MMHG — SIGNIFICANT CHANGE UP (ref 83–108)
PO2 BLDA: 95 MMHG — SIGNIFICANT CHANGE UP (ref 83–108)
POIKILOCYTOSIS BLD QL AUTO: SLIGHT — SIGNIFICANT CHANGE UP
POLYCHROMASIA BLD QL SMEAR: SIGNIFICANT CHANGE UP
POLYCHROMASIA BLD QL SMEAR: SLIGHT — SIGNIFICANT CHANGE UP
POTASSIUM SERPL-MCNC: 2.9 MMOL/L — CRITICAL LOW (ref 3.5–5.3)
POTASSIUM SERPL-MCNC: 3 MMOL/L — LOW (ref 3.5–5.3)
POTASSIUM SERPL-MCNC: 3.1 MMOL/L — LOW (ref 3.5–5.3)
POTASSIUM SERPL-MCNC: 4 MMOL/L — SIGNIFICANT CHANGE UP (ref 3.5–5.3)
POTASSIUM SERPL-MCNC: 4.1 MMOL/L — SIGNIFICANT CHANGE UP (ref 3.5–5.3)
POTASSIUM SERPL-MCNC: 4.2 MMOL/L — SIGNIFICANT CHANGE UP (ref 3.5–5.3)
POTASSIUM SERPL-MCNC: 4.3 MMOL/L — SIGNIFICANT CHANGE UP (ref 3.5–5.3)
POTASSIUM SERPL-MCNC: 4.4 MMOL/L — SIGNIFICANT CHANGE UP (ref 3.5–5.3)
POTASSIUM SERPL-MCNC: 4.5 MMOL/L — SIGNIFICANT CHANGE UP (ref 3.5–5.3)
POTASSIUM SERPL-MCNC: 4.5 MMOL/L — SIGNIFICANT CHANGE UP (ref 3.5–5.3)
POTASSIUM SERPL-MCNC: 4.6 MMOL/L — SIGNIFICANT CHANGE UP (ref 3.5–5.3)
POTASSIUM SERPL-MCNC: 4.7 MMOL/L — SIGNIFICANT CHANGE UP (ref 3.5–5.3)
POTASSIUM SERPL-MCNC: 4.8 MMOL/L — SIGNIFICANT CHANGE UP (ref 3.5–5.3)
POTASSIUM SERPL-MCNC: 4.9 MMOL/L — SIGNIFICANT CHANGE UP (ref 3.5–5.3)
POTASSIUM SERPL-MCNC: 5 MMOL/L — SIGNIFICANT CHANGE UP (ref 3.5–5.3)
POTASSIUM SERPL-MCNC: 5.1 MMOL/L — SIGNIFICANT CHANGE UP (ref 3.5–5.3)
POTASSIUM SERPL-MCNC: 5.2 MMOL/L — SIGNIFICANT CHANGE UP (ref 3.5–5.3)
POTASSIUM SERPL-MCNC: 5.2 MMOL/L — SIGNIFICANT CHANGE UP (ref 3.5–5.3)
POTASSIUM SERPL-MCNC: 5.3 MMOL/L — SIGNIFICANT CHANGE UP (ref 3.5–5.3)
POTASSIUM SERPL-MCNC: 5.4 MMOL/L — HIGH (ref 3.5–5.3)
POTASSIUM SERPL-MCNC: 5.5 MMOL/L — HIGH (ref 3.5–5.3)
POTASSIUM SERPL-MCNC: 5.7 MMOL/L — HIGH (ref 3.5–5.3)
POTASSIUM SERPL-MCNC: 5.8 MMOL/L — HIGH (ref 3.5–5.3)
POTASSIUM SERPL-MCNC: 6 MMOL/L — HIGH (ref 3.5–5.3)
POTASSIUM SERPL-MCNC: 6 MMOL/L — HIGH (ref 3.5–5.3)
POTASSIUM SERPL-MCNC: 6.1 MMOL/L — CRITICAL HIGH (ref 3.5–5.3)
POTASSIUM SERPL-SCNC: 2.9 MMOL/L — CRITICAL LOW (ref 3.5–5.3)
POTASSIUM SERPL-SCNC: 3 MMOL/L — LOW (ref 3.5–5.3)
POTASSIUM SERPL-SCNC: 3.1 MMOL/L — LOW (ref 3.5–5.3)
POTASSIUM SERPL-SCNC: 4 MMOL/L — SIGNIFICANT CHANGE UP (ref 3.5–5.3)
POTASSIUM SERPL-SCNC: 4.1 MMOL/L — SIGNIFICANT CHANGE UP (ref 3.5–5.3)
POTASSIUM SERPL-SCNC: 4.2 MMOL/L — SIGNIFICANT CHANGE UP (ref 3.5–5.3)
POTASSIUM SERPL-SCNC: 4.3 MMOL/L — SIGNIFICANT CHANGE UP (ref 3.5–5.3)
POTASSIUM SERPL-SCNC: 4.4 MMOL/L — SIGNIFICANT CHANGE UP (ref 3.5–5.3)
POTASSIUM SERPL-SCNC: 4.5 MMOL/L — SIGNIFICANT CHANGE UP (ref 3.5–5.3)
POTASSIUM SERPL-SCNC: 4.5 MMOL/L — SIGNIFICANT CHANGE UP (ref 3.5–5.3)
POTASSIUM SERPL-SCNC: 4.6 MMOL/L — SIGNIFICANT CHANGE UP (ref 3.5–5.3)
POTASSIUM SERPL-SCNC: 4.7 MMOL/L — SIGNIFICANT CHANGE UP (ref 3.5–5.3)
POTASSIUM SERPL-SCNC: 4.8 MMOL/L — SIGNIFICANT CHANGE UP (ref 3.5–5.3)
POTASSIUM SERPL-SCNC: 4.9 MMOL/L — SIGNIFICANT CHANGE UP (ref 3.5–5.3)
POTASSIUM SERPL-SCNC: 5 MMOL/L — SIGNIFICANT CHANGE UP (ref 3.5–5.3)
POTASSIUM SERPL-SCNC: 5.1 MMOL/L — SIGNIFICANT CHANGE UP (ref 3.5–5.3)
POTASSIUM SERPL-SCNC: 5.2 MMOL/L — SIGNIFICANT CHANGE UP (ref 3.5–5.3)
POTASSIUM SERPL-SCNC: 5.2 MMOL/L — SIGNIFICANT CHANGE UP (ref 3.5–5.3)
POTASSIUM SERPL-SCNC: 5.3 MMOL/L — SIGNIFICANT CHANGE UP (ref 3.5–5.3)
POTASSIUM SERPL-SCNC: 5.4 MMOL/L — HIGH (ref 3.5–5.3)
POTASSIUM SERPL-SCNC: 5.5 MMOL/L — HIGH (ref 3.5–5.3)
POTASSIUM SERPL-SCNC: 5.7 MMOL/L — HIGH (ref 3.5–5.3)
POTASSIUM SERPL-SCNC: 5.8 MMOL/L — HIGH (ref 3.5–5.3)
POTASSIUM SERPL-SCNC: 6 MMOL/L — HIGH (ref 3.5–5.3)
POTASSIUM SERPL-SCNC: 6 MMOL/L — HIGH (ref 3.5–5.3)
POTASSIUM SERPL-SCNC: 6.1 MMOL/L — CRITICAL HIGH (ref 3.5–5.3)
PROCALCITONIN SERPL-MCNC: 0.08 NG/ML — SIGNIFICANT CHANGE UP (ref 0.02–0.1)
PROCALCITONIN SERPL-MCNC: 0.16 NG/ML — HIGH (ref 0.02–0.1)
PROCALCITONIN SERPL-MCNC: 0.16 NG/ML — HIGH (ref 0.02–0.1)
PROCALCITONIN SERPL-MCNC: 0.27 NG/ML — HIGH (ref 0.02–0.1)
PROCALCITONIN SERPL-MCNC: 0.49 NG/ML — HIGH (ref 0.02–0.1)
PROCALCITONIN SERPL-MCNC: 3.36 NG/ML — HIGH (ref 0.02–0.1)
PROMYELOCYTES # FLD: 0.9 % — HIGH (ref 0–0)
PROMYELOCYTES # FLD: 0.9 % — HIGH (ref 0–0)
PROMYELOCYTES # FLD: 1.7 % — HIGH (ref 0–0)
PROT SERPL-MCNC: 5.3 G/DL — LOW (ref 6.6–8.7)
PROT SERPL-MCNC: 5.4 G/DL — LOW (ref 6.6–8.7)
PROT SERPL-MCNC: 5.7 G/DL — LOW (ref 6.6–8.7)
PROT SERPL-MCNC: 5.8 G/DL — LOW (ref 6.6–8.7)
PROT SERPL-MCNC: 5.8 G/DL — LOW (ref 6.6–8.7)
PROT SERPL-MCNC: 5.9 G/DL — LOW (ref 6.6–8.7)
PROT SERPL-MCNC: 6 G/DL — LOW (ref 6.6–8.7)
PROT SERPL-MCNC: 6.1 G/DL — LOW (ref 6.6–8.7)
PROT SERPL-MCNC: 7 G/DL — SIGNIFICANT CHANGE UP (ref 6.6–8.7)
PROT UR-MCNC: 30 MG/DL
PROTHROM AB SERPL-ACNC: 13.5 SEC — HIGH (ref 10–12.9)
PROTHROM AB SERPL-ACNC: 14.6 SEC — HIGH (ref 10–12.9)
PROTHROM AB SERPL-ACNC: 15.2 SEC — HIGH (ref 10–12.9)
PROTHROM AB SERPL-ACNC: 15.2 SEC — HIGH (ref 10–12.9)
PROTHROM AB SERPL-ACNC: 17.4 SEC — HIGH (ref 10–12.9)
RBC # BLD: 2.15 M/UL — LOW (ref 4.2–5.8)
RBC # BLD: 2.22 M/UL — LOW (ref 4.2–5.8)
RBC # BLD: 2.28 M/UL — LOW (ref 4.2–5.8)
RBC # BLD: 2.32 M/UL — LOW (ref 4.2–5.8)
RBC # BLD: 2.33 M/UL — LOW (ref 4.2–5.8)
RBC # BLD: 2.37 M/UL — LOW (ref 4.2–5.8)
RBC # BLD: 2.38 M/UL — LOW (ref 4.2–5.8)
RBC # BLD: 2.4 M/UL — LOW (ref 4.2–5.8)
RBC # BLD: 2.43 M/UL — LOW (ref 4.2–5.8)
RBC # BLD: 2.43 M/UL — LOW (ref 4.2–5.8)
RBC # BLD: 2.51 M/UL — LOW (ref 4.2–5.8)
RBC # BLD: 2.52 M/UL — LOW (ref 4.2–5.8)
RBC # BLD: 2.57 M/UL — LOW (ref 4.2–5.8)
RBC # BLD: 2.6 M/UL — LOW (ref 4.2–5.8)
RBC # BLD: 2.64 M/UL — LOW (ref 4.2–5.8)
RBC # BLD: 2.76 M/UL — LOW (ref 4.2–5.8)
RBC # BLD: 2.77 M/UL — LOW (ref 4.2–5.8)
RBC # BLD: 2.77 M/UL — LOW (ref 4.2–5.8)
RBC # BLD: 2.78 M/UL — LOW (ref 4.2–5.8)
RBC # BLD: 2.85 M/UL — LOW (ref 4.2–5.8)
RBC # BLD: 2.95 M/UL — LOW (ref 4.2–5.8)
RBC # BLD: 2.96 M/UL — LOW (ref 4.2–5.8)
RBC # BLD: 2.97 M/UL — LOW (ref 4.2–5.8)
RBC # BLD: 2.97 M/UL — LOW (ref 4.2–5.8)
RBC # BLD: 3.01 M/UL — LOW (ref 4.2–5.8)
RBC # BLD: 3.19 M/UL — LOW (ref 4.2–5.8)
RBC # BLD: 3.31 M/UL — LOW (ref 4.2–5.8)
RBC # BLD: 3.35 M/UL — LOW (ref 4.2–5.8)
RBC # BLD: 3.36 M/UL — LOW (ref 4.2–5.8)
RBC # BLD: 3.53 M/UL — LOW (ref 4.2–5.8)
RBC # BLD: 3.75 M/UL — LOW (ref 4.2–5.8)
RBC # BLD: 3.84 M/UL — LOW (ref 4.2–5.8)
RBC # BLD: 3.86 M/UL — LOW (ref 4.2–5.8)
RBC # BLD: 3.89 M/UL — LOW (ref 4.2–5.8)
RBC # BLD: 3.91 M/UL — LOW (ref 4.2–5.8)
RBC # BLD: 3.91 M/UL — LOW (ref 4.2–5.8)
RBC # BLD: 3.99 M/UL — LOW (ref 4.2–5.8)
RBC # BLD: 4.02 M/UL — LOW (ref 4.2–5.8)
RBC # BLD: 4.34 M/UL — SIGNIFICANT CHANGE UP (ref 4.2–5.8)
RBC # BLD: 4.5 M/UL — SIGNIFICANT CHANGE UP (ref 4.2–5.8)
RBC # BLD: 4.52 M/UL — SIGNIFICANT CHANGE UP (ref 4.2–5.8)
RBC # FLD: 14.4 % — SIGNIFICANT CHANGE UP (ref 10.3–14.5)
RBC # FLD: 14.5 % — SIGNIFICANT CHANGE UP (ref 10.3–14.5)
RBC # FLD: 14.6 % — HIGH (ref 10.3–14.5)
RBC # FLD: 14.7 % — HIGH (ref 10.3–14.5)
RBC # FLD: 14.8 % — HIGH (ref 10.3–14.5)
RBC # FLD: 14.9 % — HIGH (ref 10.3–14.5)
RBC # FLD: 15.5 % — HIGH (ref 10.3–14.5)
RBC # FLD: 15.6 % — HIGH (ref 10.3–14.5)
RBC # FLD: 15.7 % — HIGH (ref 10.3–14.5)
RBC # FLD: 15.7 % — HIGH (ref 10.3–14.5)
RBC # FLD: 15.8 % — HIGH (ref 10.3–14.5)
RBC # FLD: 15.9 % — HIGH (ref 10.3–14.5)
RBC # FLD: 16 % — HIGH (ref 10.3–14.5)
RBC # FLD: 16.3 % — HIGH (ref 10.3–14.5)
RBC # FLD: 16.4 % — HIGH (ref 10.3–14.5)
RBC # FLD: 16.7 % — HIGH (ref 10.3–14.5)
RBC # FLD: 17.3 % — HIGH (ref 10.3–14.5)
RBC # FLD: 18 % — HIGH (ref 10.3–14.5)
RBC # FLD: 18.8 % — HIGH (ref 10.3–14.5)
RBC # FLD: 19.1 % — HIGH (ref 10.3–14.5)
RBC # FLD: 19.3 % — HIGH (ref 10.3–14.5)
RBC # FLD: 19.4 % — HIGH (ref 10.3–14.5)
RBC # FLD: 19.8 % — HIGH (ref 10.3–14.5)
RBC # FLD: 19.9 % — HIGH (ref 10.3–14.5)
RBC # FLD: 20 % — HIGH (ref 10.3–14.5)
RBC # FLD: 20.1 % — HIGH (ref 10.3–14.5)
RBC # FLD: 20.3 % — HIGH (ref 10.3–14.5)
RBC # FLD: 20.3 % — HIGH (ref 10.3–14.5)
RBC # FLD: 20.5 % — HIGH (ref 10.3–14.5)
RBC # FLD: 20.5 % — HIGH (ref 10.3–14.5)
RBC # FLD: 20.9 % — HIGH (ref 10.3–14.5)
RBC # FLD: 21.1 % — HIGH (ref 10.3–14.5)
RBC # FLD: 21.3 % — HIGH (ref 10.3–14.5)
RBC # FLD: 21.5 % — HIGH (ref 10.3–14.5)
RBC BLD AUTO: ABNORMAL
RBC BLD AUTO: SIGNIFICANT CHANGE UP
RBC CASTS # UR COMP ASSIST: ABNORMAL /HPF (ref 0–4)
SAO2 % BLDA: 92 % — LOW (ref 95–99)
SAO2 % BLDA: 94 % — LOW (ref 95–99)
SAO2 % BLDA: 99 % — SIGNIFICANT CHANGE UP (ref 95–99)
SAO2 % BLDA: 99 % — SIGNIFICANT CHANGE UP (ref 95–99)
SARS-COV-2 RNA SPEC QL NAA+PROBE: DETECTED
SCHISTOCYTES BLD QL AUTO: SLIGHT — SIGNIFICANT CHANGE UP
SCHISTOCYTES BLD QL AUTO: SLIGHT — SIGNIFICANT CHANGE UP
SMUDGE CELLS # BLD: PRESENT — SIGNIFICANT CHANGE UP
SODIUM SERPL-SCNC: 128 MMOL/L — LOW (ref 135–145)
SODIUM SERPL-SCNC: 129 MMOL/L — LOW (ref 135–145)
SODIUM SERPL-SCNC: 131 MMOL/L — LOW (ref 135–145)
SODIUM SERPL-SCNC: 132 MMOL/L — LOW (ref 135–145)
SODIUM SERPL-SCNC: 133 MMOL/L — LOW (ref 135–145)
SODIUM SERPL-SCNC: 134 MMOL/L — LOW (ref 135–145)
SODIUM SERPL-SCNC: 135 MMOL/L — SIGNIFICANT CHANGE UP (ref 135–145)
SODIUM SERPL-SCNC: 136 MMOL/L — SIGNIFICANT CHANGE UP (ref 135–145)
SODIUM SERPL-SCNC: 137 MMOL/L — SIGNIFICANT CHANGE UP (ref 135–145)
SODIUM SERPL-SCNC: 137 MMOL/L — SIGNIFICANT CHANGE UP (ref 135–145)
SODIUM SERPL-SCNC: 138 MMOL/L — SIGNIFICANT CHANGE UP (ref 135–145)
SODIUM SERPL-SCNC: 140 MMOL/L — SIGNIFICANT CHANGE UP (ref 135–145)
SODIUM SERPL-SCNC: 140 MMOL/L — SIGNIFICANT CHANGE UP (ref 135–145)
SODIUM SERPL-SCNC: 141 MMOL/L — SIGNIFICANT CHANGE UP (ref 135–145)
SODIUM SERPL-SCNC: 142 MMOL/L — SIGNIFICANT CHANGE UP (ref 135–145)
SODIUM SERPL-SCNC: 143 MMOL/L — SIGNIFICANT CHANGE UP (ref 135–145)
SODIUM SERPL-SCNC: 143 MMOL/L — SIGNIFICANT CHANGE UP (ref 135–145)
SODIUM SERPL-SCNC: 144 MMOL/L — SIGNIFICANT CHANGE UP (ref 135–145)
SODIUM SERPL-SCNC: 144 MMOL/L — SIGNIFICANT CHANGE UP (ref 135–145)
SODIUM SERPL-SCNC: 146 MMOL/L — HIGH (ref 135–145)
SODIUM SERPL-SCNC: 146 MMOL/L — HIGH (ref 135–145)
SODIUM SERPL-SCNC: 147 MMOL/L — HIGH (ref 135–145)
SODIUM SERPL-SCNC: 148 MMOL/L — HIGH (ref 135–145)
SODIUM SERPL-SCNC: 148 MMOL/L — HIGH (ref 135–145)
SODIUM SERPL-SCNC: 149 MMOL/L — HIGH (ref 135–145)
SODIUM SERPL-SCNC: 149 MMOL/L — HIGH (ref 135–145)
SODIUM SERPL-SCNC: 151 MMOL/L — HIGH (ref 135–145)
SODIUM SERPL-SCNC: 154 MMOL/L — HIGH (ref 135–145)
SODIUM SERPL-SCNC: 155 MMOL/L — HIGH (ref 135–145)
SODIUM SERPL-SCNC: 156 MMOL/L — HIGH (ref 135–145)
SODIUM SERPL-SCNC: 159 MMOL/L — HIGH (ref 135–145)
SP GR SPEC: 1.01 — SIGNIFICANT CHANGE UP (ref 1.01–1.02)
SPECIMEN SOURCE: SIGNIFICANT CHANGE UP
STOMATOCYTES BLD QL SMEAR: SLIGHT — SIGNIFICANT CHANGE UP
TOTAL CHOLESTEROL/HDL RATIO MEASUREMENT: 2 RATIO — LOW (ref 3.4–9.6)
TOTAL CHOLESTEROL/HDL RATIO MEASUREMENT: 4 RATIO — SIGNIFICANT CHANGE UP (ref 3.4–9.6)
TRIGL SERPL-MCNC: 107 MG/DL — SIGNIFICANT CHANGE UP (ref 10–200)
TRIGL SERPL-MCNC: 117 MG/DL — SIGNIFICANT CHANGE UP (ref 10–200)
TRIGL SERPL-MCNC: 196 MG/DL — SIGNIFICANT CHANGE UP (ref 10–200)
TRIGL SERPL-MCNC: 68 MG/DL — SIGNIFICANT CHANGE UP (ref 10–200)
TROPONIN T SERPL-MCNC: 0.02 NG/ML — SIGNIFICANT CHANGE UP (ref 0–0.06)
TROPONIN T SERPL-MCNC: 0.08 NG/ML — HIGH (ref 0–0.06)
TROPONIN T SERPL-MCNC: 0.11 NG/ML — HIGH (ref 0–0.06)
TROPONIN T SERPL-MCNC: 0.13 NG/ML — HIGH (ref 0–0.06)
TROPONIN T SERPL-MCNC: 0.13 NG/ML — HIGH (ref 0–0.06)
TROPONIN T SERPL-MCNC: 0.22 NG/ML — HIGH (ref 0–0.06)
TSH SERPL-MCNC: 0.47 UIU/ML — SIGNIFICANT CHANGE UP (ref 0.27–4.2)
UROBILINOGEN FLD QL: NEGATIVE MG/DL — SIGNIFICANT CHANGE UP
VANCOMYCIN TROUGH SERPL-MCNC: 14.4 UG/ML — SIGNIFICANT CHANGE UP (ref 10–20)
VANCOMYCIN TROUGH SERPL-MCNC: 8.5 UG/ML — LOW (ref 10–20)
VANCOMYCIN TROUGH SERPL-MCNC: 9.5 UG/ML — LOW (ref 10–20)
VARIANT LYMPHS # BLD: 0.9 % — SIGNIFICANT CHANGE UP (ref 0–6)
VARIANT LYMPHS # BLD: 1.8 % — SIGNIFICANT CHANGE UP (ref 0–6)
WBC # BLD: 10.2 K/UL — SIGNIFICANT CHANGE UP (ref 3.8–10.5)
WBC # BLD: 10.26 K/UL — SIGNIFICANT CHANGE UP (ref 3.8–10.5)
WBC # BLD: 11.82 K/UL — HIGH (ref 3.8–10.5)
WBC # BLD: 12.29 K/UL — HIGH (ref 3.8–10.5)
WBC # BLD: 12.66 K/UL — HIGH (ref 3.8–10.5)
WBC # BLD: 15.49 K/UL — HIGH (ref 3.8–10.5)
WBC # BLD: 3.51 K/UL — LOW (ref 3.8–10.5)
WBC # BLD: 3.96 K/UL — SIGNIFICANT CHANGE UP (ref 3.8–10.5)
WBC # BLD: 4.14 K/UL — SIGNIFICANT CHANGE UP (ref 3.8–10.5)
WBC # BLD: 4.6 K/UL — SIGNIFICANT CHANGE UP (ref 3.8–10.5)
WBC # BLD: 5.16 K/UL — SIGNIFICANT CHANGE UP (ref 3.8–10.5)
WBC # BLD: 5.23 K/UL — SIGNIFICANT CHANGE UP (ref 3.8–10.5)
WBC # BLD: 5.45 K/UL — SIGNIFICANT CHANGE UP (ref 3.8–10.5)
WBC # BLD: 5.53 K/UL — SIGNIFICANT CHANGE UP (ref 3.8–10.5)
WBC # BLD: 5.61 K/UL — SIGNIFICANT CHANGE UP (ref 3.8–10.5)
WBC # BLD: 5.84 K/UL — SIGNIFICANT CHANGE UP (ref 3.8–10.5)
WBC # BLD: 5.9 K/UL — SIGNIFICANT CHANGE UP (ref 3.8–10.5)
WBC # BLD: 6.37 K/UL — SIGNIFICANT CHANGE UP (ref 3.8–10.5)
WBC # BLD: 6.42 K/UL — SIGNIFICANT CHANGE UP (ref 3.8–10.5)
WBC # BLD: 6.52 K/UL — SIGNIFICANT CHANGE UP (ref 3.8–10.5)
WBC # BLD: 6.6 K/UL — SIGNIFICANT CHANGE UP (ref 3.8–10.5)
WBC # BLD: 6.64 K/UL — SIGNIFICANT CHANGE UP (ref 3.8–10.5)
WBC # BLD: 6.77 K/UL — SIGNIFICANT CHANGE UP (ref 3.8–10.5)
WBC # BLD: 6.78 K/UL — SIGNIFICANT CHANGE UP (ref 3.8–10.5)
WBC # BLD: 6.92 K/UL — SIGNIFICANT CHANGE UP (ref 3.8–10.5)
WBC # BLD: 7.21 K/UL — SIGNIFICANT CHANGE UP (ref 3.8–10.5)
WBC # BLD: 7.23 K/UL — SIGNIFICANT CHANGE UP (ref 3.8–10.5)
WBC # BLD: 7.25 K/UL — SIGNIFICANT CHANGE UP (ref 3.8–10.5)
WBC # BLD: 7.5 K/UL — SIGNIFICANT CHANGE UP (ref 3.8–10.5)
WBC # BLD: 7.57 K/UL — SIGNIFICANT CHANGE UP (ref 3.8–10.5)
WBC # BLD: 7.61 K/UL — SIGNIFICANT CHANGE UP (ref 3.8–10.5)
WBC # BLD: 8 K/UL — SIGNIFICANT CHANGE UP (ref 3.8–10.5)
WBC # BLD: 8.38 K/UL — SIGNIFICANT CHANGE UP (ref 3.8–10.5)
WBC # BLD: 8.46 K/UL — SIGNIFICANT CHANGE UP (ref 3.8–10.5)
WBC # BLD: 8.47 K/UL — SIGNIFICANT CHANGE UP (ref 3.8–10.5)
WBC # BLD: 9 K/UL — SIGNIFICANT CHANGE UP (ref 3.8–10.5)
WBC # BLD: 9.04 K/UL — SIGNIFICANT CHANGE UP (ref 3.8–10.5)
WBC # BLD: 9.32 K/UL — SIGNIFICANT CHANGE UP (ref 3.8–10.5)
WBC # BLD: 9.67 K/UL — SIGNIFICANT CHANGE UP (ref 3.8–10.5)
WBC # BLD: 9.76 K/UL — SIGNIFICANT CHANGE UP (ref 3.8–10.5)
WBC # BLD: 9.84 K/UL — SIGNIFICANT CHANGE UP (ref 3.8–10.5)
WBC # FLD AUTO: 10.2 K/UL — SIGNIFICANT CHANGE UP (ref 3.8–10.5)
WBC # FLD AUTO: 10.26 K/UL — SIGNIFICANT CHANGE UP (ref 3.8–10.5)
WBC # FLD AUTO: 11.82 K/UL — HIGH (ref 3.8–10.5)
WBC # FLD AUTO: 12.29 K/UL — HIGH (ref 3.8–10.5)
WBC # FLD AUTO: 12.66 K/UL — HIGH (ref 3.8–10.5)
WBC # FLD AUTO: 15.49 K/UL — HIGH (ref 3.8–10.5)
WBC # FLD AUTO: 3.51 K/UL — LOW (ref 3.8–10.5)
WBC # FLD AUTO: 3.96 K/UL — SIGNIFICANT CHANGE UP (ref 3.8–10.5)
WBC # FLD AUTO: 4.14 K/UL — SIGNIFICANT CHANGE UP (ref 3.8–10.5)
WBC # FLD AUTO: 4.6 K/UL — SIGNIFICANT CHANGE UP (ref 3.8–10.5)
WBC # FLD AUTO: 5.16 K/UL — SIGNIFICANT CHANGE UP (ref 3.8–10.5)
WBC # FLD AUTO: 5.23 K/UL — SIGNIFICANT CHANGE UP (ref 3.8–10.5)
WBC # FLD AUTO: 5.45 K/UL — SIGNIFICANT CHANGE UP (ref 3.8–10.5)
WBC # FLD AUTO: 5.53 K/UL — SIGNIFICANT CHANGE UP (ref 3.8–10.5)
WBC # FLD AUTO: 5.61 K/UL — SIGNIFICANT CHANGE UP (ref 3.8–10.5)
WBC # FLD AUTO: 5.84 K/UL — SIGNIFICANT CHANGE UP (ref 3.8–10.5)
WBC # FLD AUTO: 5.9 K/UL — SIGNIFICANT CHANGE UP (ref 3.8–10.5)
WBC # FLD AUTO: 6.37 K/UL — SIGNIFICANT CHANGE UP (ref 3.8–10.5)
WBC # FLD AUTO: 6.42 K/UL — SIGNIFICANT CHANGE UP (ref 3.8–10.5)
WBC # FLD AUTO: 6.52 K/UL — SIGNIFICANT CHANGE UP (ref 3.8–10.5)
WBC # FLD AUTO: 6.6 K/UL — SIGNIFICANT CHANGE UP (ref 3.8–10.5)
WBC # FLD AUTO: 6.64 K/UL — SIGNIFICANT CHANGE UP (ref 3.8–10.5)
WBC # FLD AUTO: 6.77 K/UL — SIGNIFICANT CHANGE UP (ref 3.8–10.5)
WBC # FLD AUTO: 6.78 K/UL — SIGNIFICANT CHANGE UP (ref 3.8–10.5)
WBC # FLD AUTO: 6.92 K/UL — SIGNIFICANT CHANGE UP (ref 3.8–10.5)
WBC # FLD AUTO: 7.21 K/UL — SIGNIFICANT CHANGE UP (ref 3.8–10.5)
WBC # FLD AUTO: 7.23 K/UL — SIGNIFICANT CHANGE UP (ref 3.8–10.5)
WBC # FLD AUTO: 7.25 K/UL — SIGNIFICANT CHANGE UP (ref 3.8–10.5)
WBC # FLD AUTO: 7.5 K/UL — SIGNIFICANT CHANGE UP (ref 3.8–10.5)
WBC # FLD AUTO: 7.57 K/UL — SIGNIFICANT CHANGE UP (ref 3.8–10.5)
WBC # FLD AUTO: 7.61 K/UL — SIGNIFICANT CHANGE UP (ref 3.8–10.5)
WBC # FLD AUTO: 8 K/UL — SIGNIFICANT CHANGE UP (ref 3.8–10.5)
WBC # FLD AUTO: 8.38 K/UL — SIGNIFICANT CHANGE UP (ref 3.8–10.5)
WBC # FLD AUTO: 8.46 K/UL — SIGNIFICANT CHANGE UP (ref 3.8–10.5)
WBC # FLD AUTO: 8.47 K/UL — SIGNIFICANT CHANGE UP (ref 3.8–10.5)
WBC # FLD AUTO: 9 K/UL — SIGNIFICANT CHANGE UP (ref 3.8–10.5)
WBC # FLD AUTO: 9.04 K/UL — SIGNIFICANT CHANGE UP (ref 3.8–10.5)
WBC # FLD AUTO: 9.32 K/UL — SIGNIFICANT CHANGE UP (ref 3.8–10.5)
WBC # FLD AUTO: 9.67 K/UL — SIGNIFICANT CHANGE UP (ref 3.8–10.5)
WBC # FLD AUTO: 9.76 K/UL — SIGNIFICANT CHANGE UP (ref 3.8–10.5)
WBC # FLD AUTO: 9.84 K/UL — SIGNIFICANT CHANGE UP (ref 3.8–10.5)
WBC UR QL: SIGNIFICANT CHANGE UP

## 2020-01-01 PROCEDURE — 99233 SBSQ HOSP IP/OBS HIGH 50: CPT

## 2020-01-01 PROCEDURE — 99291 CRITICAL CARE FIRST HOUR: CPT

## 2020-01-01 PROCEDURE — 99223 1ST HOSP IP/OBS HIGH 75: CPT

## 2020-01-01 PROCEDURE — 71045 X-RAY EXAM CHEST 1 VIEW: CPT | Mod: 26

## 2020-01-01 PROCEDURE — 70450 CT HEAD/BRAIN W/O DYE: CPT | Mod: 26

## 2020-01-01 PROCEDURE — 74176 CT ABD & PELVIS W/O CONTRAST: CPT | Mod: 26

## 2020-01-01 PROCEDURE — 93010 ELECTROCARDIOGRAM REPORT: CPT

## 2020-01-01 PROCEDURE — 99233 SBSQ HOSP IP/OBS HIGH 50: CPT | Mod: CS

## 2020-01-01 PROCEDURE — 76937 US GUIDE VASCULAR ACCESS: CPT | Mod: 26

## 2020-01-01 PROCEDURE — 93880 EXTRACRANIAL BILAT STUDY: CPT | Mod: 26

## 2020-01-01 PROCEDURE — 99291 CRITICAL CARE FIRST HOUR: CPT | Mod: CS

## 2020-01-01 PROCEDURE — 99232 SBSQ HOSP IP/OBS MODERATE 35: CPT

## 2020-01-01 PROCEDURE — 99284 EMERGENCY DEPT VISIT MOD MDM: CPT | Mod: 25

## 2020-01-01 PROCEDURE — 71045 X-RAY EXAM CHEST 1 VIEW: CPT | Mod: 26,77

## 2020-01-01 PROCEDURE — 93010 ELECTROCARDIOGRAM REPORT: CPT | Mod: 77

## 2020-01-01 PROCEDURE — 36556 INSERT NON-TUNNEL CV CATH: CPT

## 2020-01-01 PROCEDURE — 31500 INSERT EMERGENCY AIRWAY: CPT

## 2020-01-01 PROCEDURE — 71250 CT THORAX DX C-: CPT | Mod: 26

## 2020-01-01 PROCEDURE — 93308 TTE F-UP OR LMTD: CPT | Mod: 26

## 2020-01-01 RX ORDER — HYDROXYCHLOROQUINE SULFATE 200 MG
200 TABLET ORAL EVERY 12 HOURS
Refills: 0 | Status: COMPLETED | OUTPATIENT
Start: 2020-01-01 | End: 2020-01-01

## 2020-01-01 RX ORDER — ROCURONIUM BROMIDE 10 MG/ML
50 VIAL (ML) INTRAVENOUS ONCE
Refills: 0 | Status: COMPLETED | OUTPATIENT
Start: 2020-01-01 | End: 2020-01-01

## 2020-01-01 RX ORDER — HYDROMORPHONE HYDROCHLORIDE 2 MG/ML
1 INJECTION INTRAMUSCULAR; INTRAVENOUS; SUBCUTANEOUS
Refills: 0 | Status: DISCONTINUED | OUTPATIENT
Start: 2020-01-01 | End: 2020-01-01

## 2020-01-01 RX ORDER — DEXTROSE 50 % IN WATER 50 %
12.5 SYRINGE (ML) INTRAVENOUS ONCE
Refills: 0 | Status: DISCONTINUED | OUTPATIENT
Start: 2020-01-01 | End: 2020-01-01

## 2020-01-01 RX ORDER — VANCOMYCIN HCL 1 G
VIAL (EA) INTRAVENOUS
Refills: 0 | Status: DISCONTINUED | OUTPATIENT
Start: 2020-01-01 | End: 2020-01-01

## 2020-01-01 RX ORDER — INSULIN LISPRO 100/ML
VIAL (ML) SUBCUTANEOUS EVERY 6 HOURS
Refills: 0 | Status: DISCONTINUED | OUTPATIENT
Start: 2020-01-01 | End: 2020-01-01

## 2020-01-01 RX ORDER — AMIODARONE HYDROCHLORIDE 400 MG/1
0.24 TABLET ORAL
Qty: 900 | Refills: 0 | Status: DISCONTINUED | OUTPATIENT
Start: 2020-01-01 | End: 2020-01-01

## 2020-01-01 RX ORDER — SODIUM CHLORIDE 9 MG/ML
1000 INJECTION INTRAMUSCULAR; INTRAVENOUS; SUBCUTANEOUS
Refills: 0 | Status: DISCONTINUED | OUTPATIENT
Start: 2020-01-01 | End: 2020-01-01

## 2020-01-01 RX ORDER — HEPARIN SODIUM 5000 [USP'U]/ML
INJECTION INTRAVENOUS; SUBCUTANEOUS
Qty: 25000 | Refills: 0 | Status: DISCONTINUED | OUTPATIENT
Start: 2020-01-01 | End: 2020-01-01

## 2020-01-01 RX ORDER — FENTANYL CITRATE 50 UG/ML
50 INJECTION INTRAVENOUS EVERY 4 HOURS
Refills: 0 | Status: DISCONTINUED | OUTPATIENT
Start: 2020-01-01 | End: 2020-01-01

## 2020-01-01 RX ORDER — POTASSIUM CHLORIDE 20 MEQ
20 PACKET (EA) ORAL ONCE
Refills: 0 | Status: DISCONTINUED | OUTPATIENT
Start: 2020-01-01 | End: 2020-01-01

## 2020-01-01 RX ORDER — INSULIN HUMAN 100 [IU]/ML
17 INJECTION, SOLUTION SUBCUTANEOUS EVERY 6 HOURS
Refills: 0 | Status: DISCONTINUED | OUTPATIENT
Start: 2020-01-01 | End: 2020-01-01

## 2020-01-01 RX ORDER — SODIUM CHLORIDE 9 MG/ML
1000 INJECTION, SOLUTION INTRAVENOUS
Refills: 0 | Status: DISCONTINUED | OUTPATIENT
Start: 2020-01-01 | End: 2020-01-01

## 2020-01-01 RX ORDER — DEXMEDETOMIDINE HYDROCHLORIDE IN 0.9% SODIUM CHLORIDE 4 UG/ML
0.3 INJECTION INTRAVENOUS
Qty: 200 | Refills: 0 | Status: DISCONTINUED | OUTPATIENT
Start: 2020-01-01 | End: 2020-01-01

## 2020-01-01 RX ORDER — SODIUM CHLORIDE 9 MG/ML
500 INJECTION, SOLUTION INTRAVENOUS ONCE
Refills: 0 | Status: COMPLETED | OUTPATIENT
Start: 2020-01-01 | End: 2020-01-01

## 2020-01-01 RX ORDER — ASPIRIN/CALCIUM CARB/MAGNESIUM 324 MG
81 TABLET ORAL DAILY
Refills: 0 | Status: DISCONTINUED | OUTPATIENT
Start: 2020-01-01 | End: 2020-01-01

## 2020-01-01 RX ORDER — HEPARIN SODIUM 5000 [USP'U]/ML
2100 INJECTION INTRAVENOUS; SUBCUTANEOUS
Qty: 25000 | Refills: 0 | Status: DISCONTINUED | OUTPATIENT
Start: 2020-01-01 | End: 2020-01-01

## 2020-01-01 RX ORDER — INSULIN GLARGINE 100 [IU]/ML
60 INJECTION, SOLUTION SUBCUTANEOUS EVERY MORNING
Refills: 0 | Status: DISCONTINUED | OUTPATIENT
Start: 2020-01-01 | End: 2020-01-01

## 2020-01-01 RX ORDER — CEFTRIAXONE 500 MG/1
1000 INJECTION, POWDER, FOR SOLUTION INTRAMUSCULAR; INTRAVENOUS ONCE
Refills: 0 | Status: COMPLETED | OUTPATIENT
Start: 2020-01-01 | End: 2020-01-01

## 2020-01-01 RX ORDER — DEXTROSE 50 % IN WATER 50 %
25 SYRINGE (ML) INTRAVENOUS ONCE
Refills: 0 | Status: DISCONTINUED | OUTPATIENT
Start: 2020-01-01 | End: 2020-01-01

## 2020-01-01 RX ORDER — LANOLIN ALCOHOL/MO/W.PET/CERES
5 CREAM (GRAM) TOPICAL AT BEDTIME
Refills: 0 | Status: DISCONTINUED | OUTPATIENT
Start: 2020-01-01 | End: 2020-01-01

## 2020-01-01 RX ORDER — POTASSIUM CHLORIDE 20 MEQ
20 PACKET (EA) ORAL
Refills: 0 | Status: COMPLETED | OUTPATIENT
Start: 2020-01-01 | End: 2020-01-01

## 2020-01-01 RX ORDER — FENTANYL CITRATE 50 UG/ML
25 INJECTION INTRAVENOUS ONCE
Refills: 0 | Status: DISCONTINUED | OUTPATIENT
Start: 2020-01-01 | End: 2020-01-01

## 2020-01-01 RX ORDER — HYDROXYCHLOROQUINE SULFATE 200 MG
400 TABLET ORAL EVERY 12 HOURS
Refills: 0 | Status: COMPLETED | OUTPATIENT
Start: 2020-01-01 | End: 2020-01-01

## 2020-01-01 RX ORDER — ENOXAPARIN SODIUM 100 MG/ML
40 INJECTION SUBCUTANEOUS DAILY
Refills: 0 | Status: DISCONTINUED | OUTPATIENT
Start: 2020-01-01 | End: 2020-01-01

## 2020-01-01 RX ORDER — HYDROXYCHLOROQUINE SULFATE 200 MG
TABLET ORAL
Refills: 0 | Status: DISCONTINUED | OUTPATIENT
Start: 2020-01-01 | End: 2020-01-01

## 2020-01-01 RX ORDER — HEPARIN SODIUM 5000 [USP'U]/ML
5000 INJECTION INTRAVENOUS; SUBCUTANEOUS EVERY 6 HOURS
Refills: 0 | Status: DISCONTINUED | OUTPATIENT
Start: 2020-01-01 | End: 2020-01-01

## 2020-01-01 RX ORDER — OXYCODONE HYDROCHLORIDE 5 MG/1
5 TABLET ORAL EVERY 4 HOURS
Refills: 0 | Status: DISCONTINUED | OUTPATIENT
Start: 2020-01-01 | End: 2020-01-01

## 2020-01-01 RX ORDER — FENTANYL CITRATE 50 UG/ML
100 INJECTION INTRAVENOUS ONCE
Refills: 0 | Status: DISCONTINUED | OUTPATIENT
Start: 2020-01-01 | End: 2020-01-01

## 2020-01-01 RX ORDER — HYDROMORPHONE HYDROCHLORIDE 2 MG/ML
1 INJECTION INTRAMUSCULAR; INTRAVENOUS; SUBCUTANEOUS
Qty: 10 | Refills: 0 | Status: DISCONTINUED | OUTPATIENT
Start: 2020-01-01 | End: 2020-01-01

## 2020-01-01 RX ORDER — VANCOMYCIN HCL 1 G
1000 VIAL (EA) INTRAVENOUS ONCE
Refills: 0 | Status: COMPLETED | OUTPATIENT
Start: 2020-01-01 | End: 2020-01-01

## 2020-01-01 RX ORDER — INSULIN GLARGINE 100 [IU]/ML
40 INJECTION, SOLUTION SUBCUTANEOUS AT BEDTIME
Refills: 0 | Status: DISCONTINUED | OUTPATIENT
Start: 2020-01-01 | End: 2020-01-01

## 2020-01-01 RX ORDER — METOPROLOL TARTRATE 50 MG
5 TABLET ORAL ONCE
Refills: 0 | Status: COMPLETED | OUTPATIENT
Start: 2020-01-01 | End: 2020-01-01

## 2020-01-01 RX ORDER — FLUCONAZOLE 150 MG/1
400 TABLET ORAL EVERY 24 HOURS
Refills: 0 | Status: DISCONTINUED | OUTPATIENT
Start: 2020-01-01 | End: 2020-01-01

## 2020-01-01 RX ORDER — INSULIN GLARGINE 100 [IU]/ML
75 INJECTION, SOLUTION SUBCUTANEOUS
Qty: 0 | Refills: 0 | DISCHARGE

## 2020-01-01 RX ORDER — INSULIN LISPRO 100/ML
3 VIAL (ML) SUBCUTANEOUS ONCE
Refills: 0 | Status: COMPLETED | OUTPATIENT
Start: 2020-01-01 | End: 2020-01-01

## 2020-01-01 RX ORDER — CLONAZEPAM 1 MG
0.5 TABLET ORAL EVERY 12 HOURS
Refills: 0 | Status: DISCONTINUED | OUTPATIENT
Start: 2020-01-01 | End: 2020-01-01

## 2020-01-01 RX ORDER — CEFEPIME 1 G/1
2000 INJECTION, POWDER, FOR SOLUTION INTRAMUSCULAR; INTRAVENOUS EVERY 8 HOURS
Refills: 0 | Status: COMPLETED | OUTPATIENT
Start: 2020-01-01 | End: 2020-01-01

## 2020-01-01 RX ORDER — HYDROXYCHLOROQUINE SULFATE 200 MG
TABLET ORAL
Refills: 0 | Status: COMPLETED | OUTPATIENT
Start: 2020-01-01 | End: 2020-01-01

## 2020-01-01 RX ORDER — HYDRALAZINE HCL 50 MG
10 TABLET ORAL EVERY 4 HOURS
Refills: 0 | Status: DISCONTINUED | OUTPATIENT
Start: 2020-01-01 | End: 2020-01-01

## 2020-01-01 RX ORDER — POTASSIUM CHLORIDE 20 MEQ
10 PACKET (EA) ORAL
Refills: 0 | Status: COMPLETED | OUTPATIENT
Start: 2020-01-01 | End: 2020-01-01

## 2020-01-01 RX ORDER — MIDAZOLAM HYDROCHLORIDE 1 MG/ML
4 INJECTION, SOLUTION INTRAMUSCULAR; INTRAVENOUS ONCE
Refills: 0 | Status: DISCONTINUED | OUTPATIENT
Start: 2020-01-01 | End: 2020-01-01

## 2020-01-01 RX ORDER — HYDROMORPHONE HYDROCHLORIDE 2 MG/ML
1 INJECTION INTRAMUSCULAR; INTRAVENOUS; SUBCUTANEOUS
Qty: 100 | Refills: 0 | Status: DISCONTINUED | OUTPATIENT
Start: 2020-01-01 | End: 2020-01-01

## 2020-01-01 RX ORDER — CHLORHEXIDINE GLUCONATE 213 G/1000ML
15 SOLUTION TOPICAL EVERY 12 HOURS
Refills: 0 | Status: DISCONTINUED | OUTPATIENT
Start: 2020-01-01 | End: 2020-01-01

## 2020-01-01 RX ORDER — FAMOTIDINE 10 MG/ML
20 INJECTION INTRAVENOUS EVERY 12 HOURS
Refills: 0 | Status: DISCONTINUED | OUTPATIENT
Start: 2020-01-01 | End: 2020-01-01

## 2020-01-01 RX ORDER — INSULIN LISPRO 100/ML
VIAL (ML) SUBCUTANEOUS EVERY 4 HOURS
Refills: 0 | Status: DISCONTINUED | OUTPATIENT
Start: 2020-01-01 | End: 2020-01-01

## 2020-01-01 RX ORDER — DEXTROSE 50 % IN WATER 50 %
25 SYRINGE (ML) INTRAVENOUS ONCE
Refills: 0 | Status: COMPLETED | OUTPATIENT
Start: 2020-01-01 | End: 2020-01-01

## 2020-01-01 RX ORDER — LINEZOLID 600 MG/300ML
600 INJECTION, SOLUTION INTRAVENOUS EVERY 12 HOURS
Refills: 0 | Status: COMPLETED | OUTPATIENT
Start: 2020-01-01 | End: 2020-01-01

## 2020-01-01 RX ORDER — MAGNESIUM SULFATE 500 MG/ML
2 VIAL (ML) INJECTION ONCE
Refills: 0 | Status: COMPLETED | OUTPATIENT
Start: 2020-01-01 | End: 2020-01-01

## 2020-01-01 RX ORDER — FENTANYL CITRATE 50 UG/ML
1 INJECTION INTRAVENOUS
Qty: 2500 | Refills: 0 | Status: DISCONTINUED | OUTPATIENT
Start: 2020-01-01 | End: 2020-01-01

## 2020-01-01 RX ORDER — HYDROMORPHONE HYDROCHLORIDE 2 MG/ML
1 INJECTION INTRAMUSCULAR; INTRAVENOUS; SUBCUTANEOUS ONCE
Refills: 0 | Status: DISCONTINUED | OUTPATIENT
Start: 2020-01-01 | End: 2020-01-01

## 2020-01-01 RX ORDER — HYDROXYCHLOROQUINE SULFATE 200 MG
400 TABLET ORAL EVERY 12 HOURS
Refills: 0 | Status: DISCONTINUED | OUTPATIENT
Start: 2020-01-01 | End: 2020-01-01

## 2020-01-01 RX ORDER — PANTOPRAZOLE SODIUM 20 MG/1
40 TABLET, DELAYED RELEASE ORAL DAILY
Refills: 0 | Status: DISCONTINUED | OUTPATIENT
Start: 2020-01-01 | End: 2020-01-01

## 2020-01-01 RX ORDER — ACETAMINOPHEN 500 MG
650 TABLET ORAL EVERY 6 HOURS
Refills: 0 | Status: DISCONTINUED | OUTPATIENT
Start: 2020-01-01 | End: 2020-01-01

## 2020-01-01 RX ORDER — HYDROMORPHONE HYDROCHLORIDE 2 MG/ML
2 INJECTION INTRAMUSCULAR; INTRAVENOUS; SUBCUTANEOUS ONCE
Refills: 0 | Status: DISCONTINUED | OUTPATIENT
Start: 2020-01-01 | End: 2020-01-01

## 2020-01-01 RX ORDER — INSULIN LISPRO 100/ML
VIAL (ML) SUBCUTANEOUS
Refills: 0 | Status: DISCONTINUED | OUTPATIENT
Start: 2020-01-01 | End: 2020-01-01

## 2020-01-01 RX ORDER — INSULIN GLARGINE 100 [IU]/ML
50 INJECTION, SOLUTION SUBCUTANEOUS AT BEDTIME
Refills: 0 | Status: DISCONTINUED | OUTPATIENT
Start: 2020-01-01 | End: 2020-01-01

## 2020-01-01 RX ORDER — HYDROCORTISONE 20 MG
50 TABLET ORAL EVERY 12 HOURS
Refills: 0 | Status: COMPLETED | OUTPATIENT
Start: 2020-01-01 | End: 2020-01-01

## 2020-01-01 RX ORDER — CHLORHEXIDINE GLUCONATE 213 G/1000ML
1 SOLUTION TOPICAL
Refills: 0 | Status: DISCONTINUED | OUTPATIENT
Start: 2020-01-01 | End: 2020-01-01

## 2020-01-01 RX ORDER — HYDROMORPHONE HYDROCHLORIDE 2 MG/ML
3 INJECTION INTRAMUSCULAR; INTRAVENOUS; SUBCUTANEOUS
Qty: 100 | Refills: 0 | Status: DISCONTINUED | OUTPATIENT
Start: 2020-01-01 | End: 2020-01-01

## 2020-01-01 RX ORDER — FENTANYL CITRATE 50 UG/ML
50 INJECTION INTRAVENOUS ONCE
Refills: 0 | Status: DISCONTINUED | OUTPATIENT
Start: 2020-01-01 | End: 2020-01-01

## 2020-01-01 RX ORDER — VANCOMYCIN HCL 1 G
1500 VIAL (EA) INTRAVENOUS EVERY 24 HOURS
Refills: 0 | Status: COMPLETED | OUTPATIENT
Start: 2020-01-01 | End: 2020-01-01

## 2020-01-01 RX ORDER — HEPARIN SODIUM 5000 [USP'U]/ML
10000 INJECTION INTRAVENOUS; SUBCUTANEOUS EVERY 6 HOURS
Refills: 0 | Status: DISCONTINUED | OUTPATIENT
Start: 2020-01-01 | End: 2020-01-01

## 2020-01-01 RX ORDER — PIPERACILLIN AND TAZOBACTAM 4; .5 G/20ML; G/20ML
3.38 INJECTION, POWDER, LYOPHILIZED, FOR SOLUTION INTRAVENOUS ONCE
Refills: 0 | Status: COMPLETED | OUTPATIENT
Start: 2020-01-01 | End: 2020-01-01

## 2020-01-01 RX ORDER — DEXTROSE 50 % IN WATER 50 %
15 SYRINGE (ML) INTRAVENOUS ONCE
Refills: 0 | Status: DISCONTINUED | OUTPATIENT
Start: 2020-01-01 | End: 2020-01-01

## 2020-01-01 RX ORDER — SENNA PLUS 8.6 MG/1
10 TABLET ORAL DAILY
Refills: 0 | Status: DISCONTINUED | OUTPATIENT
Start: 2020-01-01 | End: 2020-01-01

## 2020-01-01 RX ORDER — HYDRALAZINE HCL 50 MG
10 TABLET ORAL ONCE
Refills: 0 | Status: COMPLETED | OUTPATIENT
Start: 2020-01-01 | End: 2020-01-01

## 2020-01-01 RX ORDER — DEXAMETHASONE 0.5 MG/5ML
10 ELIXIR ORAL DAILY
Refills: 0 | Status: COMPLETED | OUTPATIENT
Start: 2020-01-01 | End: 2020-01-01

## 2020-01-01 RX ORDER — AMIODARONE HYDROCHLORIDE 400 MG/1
0.5 TABLET ORAL
Qty: 900 | Refills: 0 | Status: DISCONTINUED | OUTPATIENT
Start: 2020-01-01 | End: 2020-01-01

## 2020-01-01 RX ORDER — DEXMEDETOMIDINE HYDROCHLORIDE IN 0.9% SODIUM CHLORIDE 4 UG/ML
0.2 INJECTION INTRAVENOUS
Qty: 200 | Refills: 0 | Status: DISCONTINUED | OUTPATIENT
Start: 2020-01-01 | End: 2020-01-01

## 2020-01-01 RX ORDER — AMLODIPINE BESYLATE 2.5 MG/1
5 TABLET ORAL DAILY
Refills: 0 | Status: DISCONTINUED | OUTPATIENT
Start: 2020-01-01 | End: 2020-01-01

## 2020-01-01 RX ORDER — POTASSIUM CHLORIDE 20 MEQ
40 PACKET (EA) ORAL ONCE
Refills: 0 | Status: COMPLETED | OUTPATIENT
Start: 2020-01-01 | End: 2020-01-01

## 2020-01-01 RX ORDER — AMIODARONE HYDROCHLORIDE 400 MG/1
200 TABLET ORAL DAILY
Refills: 0 | Status: DISCONTINUED | OUTPATIENT
Start: 2020-01-01 | End: 2020-01-01

## 2020-01-01 RX ORDER — HEPARIN SODIUM 5000 [USP'U]/ML
7500 INJECTION INTRAVENOUS; SUBCUTANEOUS EVERY 8 HOURS
Refills: 0 | Status: DISCONTINUED | OUTPATIENT
Start: 2020-01-01 | End: 2020-01-01

## 2020-01-01 RX ORDER — DEXAMETHASONE 0.5 MG/5ML
10 ELIXIR ORAL DAILY
Refills: 0 | Status: DISCONTINUED | OUTPATIENT
Start: 2020-01-01 | End: 2020-01-01

## 2020-01-01 RX ORDER — INSULIN GLARGINE 100 [IU]/ML
60 INJECTION, SOLUTION SUBCUTANEOUS AT BEDTIME
Refills: 0 | Status: DISCONTINUED | OUTPATIENT
Start: 2020-01-01 | End: 2020-01-01

## 2020-01-01 RX ORDER — INSULIN GLARGINE 100 [IU]/ML
45 INJECTION, SOLUTION SUBCUTANEOUS AT BEDTIME
Refills: 0 | Status: DISCONTINUED | OUTPATIENT
Start: 2020-01-01 | End: 2020-01-01

## 2020-01-01 RX ORDER — INSULIN HUMAN 100 [IU]/ML
2 INJECTION, SOLUTION SUBCUTANEOUS
Qty: 100 | Refills: 0 | Status: DISCONTINUED | OUTPATIENT
Start: 2020-01-01 | End: 2020-01-01

## 2020-01-01 RX ORDER — INSULIN HUMAN 100 [IU]/ML
6 INJECTION, SOLUTION SUBCUTANEOUS ONCE
Refills: 0 | Status: COMPLETED | OUTPATIENT
Start: 2020-01-01 | End: 2020-01-01

## 2020-01-01 RX ORDER — LINEZOLID 600 MG/300ML
600 INJECTION, SOLUTION INTRAVENOUS EVERY 12 HOURS
Refills: 0 | Status: DISCONTINUED | OUTPATIENT
Start: 2020-01-01 | End: 2020-01-01

## 2020-01-01 RX ORDER — INSULIN GLARGINE 100 [IU]/ML
25 INJECTION, SOLUTION SUBCUTANEOUS EVERY MORNING
Refills: 0 | Status: DISCONTINUED | OUTPATIENT
Start: 2020-01-01 | End: 2020-01-01

## 2020-01-01 RX ORDER — HEPARIN SODIUM 5000 [USP'U]/ML
10000 INJECTION INTRAVENOUS; SUBCUTANEOUS ONCE
Refills: 0 | Status: COMPLETED | OUTPATIENT
Start: 2020-01-01 | End: 2020-01-01

## 2020-01-01 RX ORDER — ALBUTEROL 90 UG/1
2 AEROSOL, METERED ORAL EVERY 6 HOURS
Refills: 0 | Status: DISCONTINUED | OUTPATIENT
Start: 2020-01-01 | End: 2020-01-01

## 2020-01-01 RX ORDER — METOPROLOL TARTRATE 50 MG
25 TABLET ORAL
Refills: 0 | Status: DISCONTINUED | OUTPATIENT
Start: 2020-01-01 | End: 2020-01-01

## 2020-01-01 RX ORDER — LINEZOLID 600 MG/300ML
600 INJECTION, SOLUTION INTRAVENOUS ONCE
Refills: 0 | Status: COMPLETED | OUTPATIENT
Start: 2020-01-01 | End: 2020-01-01

## 2020-01-01 RX ORDER — VECURONIUM BROMIDE 20 MG/1
10 INJECTION, POWDER, FOR SOLUTION INTRAVENOUS ONCE
Refills: 0 | Status: COMPLETED | OUTPATIENT
Start: 2020-01-01 | End: 2020-01-01

## 2020-01-01 RX ORDER — ACETAMINOPHEN 500 MG
1000 TABLET ORAL ONCE
Refills: 0 | Status: COMPLETED | OUTPATIENT
Start: 2020-01-01 | End: 2020-01-01

## 2020-01-01 RX ORDER — MIDAZOLAM HYDROCHLORIDE 1 MG/ML
2 INJECTION, SOLUTION INTRAMUSCULAR; INTRAVENOUS ONCE
Refills: 0 | Status: DISCONTINUED | OUTPATIENT
Start: 2020-01-01 | End: 2020-01-01

## 2020-01-01 RX ORDER — HEPARIN SODIUM 5000 [USP'U]/ML
5000 INJECTION INTRAVENOUS; SUBCUTANEOUS EVERY 12 HOURS
Refills: 0 | Status: DISCONTINUED | OUTPATIENT
Start: 2020-01-01 | End: 2020-01-01

## 2020-01-01 RX ORDER — MEROPENEM 1 G/30ML
1000 INJECTION INTRAVENOUS EVERY 8 HOURS
Refills: 0 | Status: DISCONTINUED | OUTPATIENT
Start: 2020-01-01 | End: 2020-01-01

## 2020-01-01 RX ORDER — CALCIUM GLUCONATE 100 MG/ML
1 VIAL (ML) INTRAVENOUS ONCE
Refills: 0 | Status: COMPLETED | OUTPATIENT
Start: 2020-01-01 | End: 2020-01-01

## 2020-01-01 RX ORDER — HYDROMORPHONE HYDROCHLORIDE 2 MG/ML
0.5 INJECTION INTRAMUSCULAR; INTRAVENOUS; SUBCUTANEOUS
Qty: 100 | Refills: 0 | Status: DISCONTINUED | OUTPATIENT
Start: 2020-01-01 | End: 2020-01-01

## 2020-01-01 RX ORDER — HEPARIN SODIUM 5000 [USP'U]/ML
1800 INJECTION INTRAVENOUS; SUBCUTANEOUS
Qty: 25000 | Refills: 0 | Status: DISCONTINUED | OUTPATIENT
Start: 2020-01-01 | End: 2020-01-01

## 2020-01-01 RX ORDER — INSULIN GLARGINE 100 [IU]/ML
45 INJECTION, SOLUTION SUBCUTANEOUS EVERY MORNING
Refills: 0 | Status: DISCONTINUED | OUTPATIENT
Start: 2020-01-01 | End: 2020-01-01

## 2020-01-01 RX ORDER — INSULIN GLARGINE 100 [IU]/ML
40 INJECTION, SOLUTION SUBCUTANEOUS EVERY MORNING
Refills: 0 | Status: DISCONTINUED | OUTPATIENT
Start: 2020-01-01 | End: 2020-01-01

## 2020-01-01 RX ORDER — ALBUTEROL 90 UG/1
6 AEROSOL, METERED ORAL EVERY 6 HOURS
Refills: 0 | Status: DISCONTINUED | OUTPATIENT
Start: 2020-01-01 | End: 2020-01-01

## 2020-01-01 RX ORDER — ALBUTEROL 90 UG/1
6 AEROSOL, METERED ORAL EVERY 4 HOURS
Refills: 0 | Status: DISCONTINUED | OUTPATIENT
Start: 2020-01-01 | End: 2020-01-01

## 2020-01-01 RX ORDER — ALTEPLASE 100 MG
2 KIT INTRAVENOUS ONCE
Refills: 0 | Status: COMPLETED | OUTPATIENT
Start: 2020-01-01 | End: 2020-01-01

## 2020-01-01 RX ORDER — ENOXAPARIN SODIUM 100 MG/ML
40 INJECTION SUBCUTANEOUS EVERY 12 HOURS
Refills: 0 | Status: DISCONTINUED | OUTPATIENT
Start: 2020-01-01 | End: 2020-01-01

## 2020-01-01 RX ORDER — SODIUM CHLORIDE 9 MG/ML
1000 INJECTION INTRAMUSCULAR; INTRAVENOUS; SUBCUTANEOUS ONCE
Refills: 0 | Status: COMPLETED | OUTPATIENT
Start: 2020-01-01 | End: 2020-01-01

## 2020-01-01 RX ORDER — NOREPINEPHRINE BITARTRATE/D5W 8 MG/250ML
0.05 PLASTIC BAG, INJECTION (ML) INTRAVENOUS
Qty: 8 | Refills: 0 | Status: DISCONTINUED | OUTPATIENT
Start: 2020-01-01 | End: 2020-01-01

## 2020-01-01 RX ORDER — INSULIN GLARGINE 100 [IU]/ML
50 INJECTION, SOLUTION SUBCUTANEOUS EVERY MORNING
Refills: 0 | Status: DISCONTINUED | OUTPATIENT
Start: 2020-01-01 | End: 2020-01-01

## 2020-01-01 RX ORDER — LINEZOLID 600 MG/300ML
INJECTION, SOLUTION INTRAVENOUS
Refills: 0 | Status: DISCONTINUED | OUTPATIENT
Start: 2020-01-01 | End: 2020-01-01

## 2020-01-01 RX ORDER — INSULIN HUMAN 100 [IU]/ML
6 INJECTION, SOLUTION SUBCUTANEOUS
Qty: 100 | Refills: 0 | Status: DISCONTINUED | OUTPATIENT
Start: 2020-01-01 | End: 2020-01-01

## 2020-01-01 RX ORDER — FENTANYL CITRATE 50 UG/ML
50 INJECTION INTRAVENOUS
Refills: 0 | Status: DISCONTINUED | OUTPATIENT
Start: 2020-01-01 | End: 2020-01-01

## 2020-01-01 RX ORDER — CALCIUM GLUCONATE 100 MG/ML
2 VIAL (ML) INTRAVENOUS ONCE
Refills: 0 | Status: COMPLETED | OUTPATIENT
Start: 2020-01-01 | End: 2020-01-01

## 2020-01-01 RX ORDER — FUROSEMIDE 40 MG
20 TABLET ORAL ONCE
Refills: 0 | Status: COMPLETED | OUTPATIENT
Start: 2020-01-01 | End: 2020-01-01

## 2020-01-01 RX ORDER — HYDROXYCHLOROQUINE SULFATE 200 MG
200 TABLET ORAL EVERY 12 HOURS
Refills: 0 | Status: DISCONTINUED | OUTPATIENT
Start: 2020-01-01 | End: 2020-01-01

## 2020-01-01 RX ORDER — GLUCAGON INJECTION, SOLUTION 0.5 MG/.1ML
1 INJECTION, SOLUTION SUBCUTANEOUS ONCE
Refills: 0 | Status: DISCONTINUED | OUTPATIENT
Start: 2020-01-01 | End: 2020-01-01

## 2020-01-01 RX ORDER — ROCURONIUM BROMIDE 10 MG/ML
8 VIAL (ML) INTRAVENOUS
Qty: 500 | Refills: 0 | Status: DISCONTINUED | OUTPATIENT
Start: 2020-01-01 | End: 2020-01-01

## 2020-01-01 RX ORDER — FUROSEMIDE 40 MG
40 TABLET ORAL ONCE
Refills: 0 | Status: DISCONTINUED | OUTPATIENT
Start: 2020-01-01 | End: 2020-01-01

## 2020-01-01 RX ORDER — INSULIN GLARGINE 100 [IU]/ML
30 INJECTION, SOLUTION SUBCUTANEOUS AT BEDTIME
Refills: 0 | Status: DISCONTINUED | OUTPATIENT
Start: 2020-01-01 | End: 2020-01-01

## 2020-01-01 RX ORDER — CISATRACURIUM BESYLATE 2 MG/ML
3 INJECTION INTRAVENOUS
Qty: 200 | Refills: 0 | Status: DISCONTINUED | OUTPATIENT
Start: 2020-01-01 | End: 2020-01-01

## 2020-01-01 RX ORDER — INSULIN HUMAN 100 [IU]/ML
INJECTION, SOLUTION SUBCUTANEOUS EVERY 6 HOURS
Refills: 0 | Status: DISCONTINUED | OUTPATIENT
Start: 2020-01-01 | End: 2020-01-01

## 2020-01-01 RX ORDER — SODIUM CHLORIDE 9 MG/ML
1000 INJECTION, SOLUTION INTRAVENOUS ONCE
Refills: 0 | Status: COMPLETED | OUTPATIENT
Start: 2020-01-01 | End: 2020-01-01

## 2020-01-01 RX ORDER — INSULIN LISPRO 100/ML
VIAL (ML) SUBCUTANEOUS AT BEDTIME
Refills: 0 | Status: DISCONTINUED | OUTPATIENT
Start: 2020-01-01 | End: 2020-01-01

## 2020-01-01 RX ORDER — PROPOFOL 10 MG/ML
5 INJECTION, EMULSION INTRAVENOUS
Qty: 1000 | Refills: 0 | Status: DISCONTINUED | OUTPATIENT
Start: 2020-01-01 | End: 2020-01-01

## 2020-01-01 RX ORDER — INSULIN GLARGINE 100 [IU]/ML
40 INJECTION, SOLUTION SUBCUTANEOUS
Refills: 0 | Status: DISCONTINUED | OUTPATIENT
Start: 2020-01-01 | End: 2020-01-01

## 2020-01-01 RX ORDER — CHLORHEXIDINE GLUCONATE 213 G/1000ML
15 SOLUTION TOPICAL
Refills: 0 | Status: DISCONTINUED | OUTPATIENT
Start: 2020-01-01 | End: 2020-01-01

## 2020-01-01 RX ORDER — HYDROMORPHONE HYDROCHLORIDE 2 MG/ML
0.25 INJECTION INTRAMUSCULAR; INTRAVENOUS; SUBCUTANEOUS
Qty: 100 | Refills: 0 | Status: DISCONTINUED | OUTPATIENT
Start: 2020-01-01 | End: 2020-01-01

## 2020-01-01 RX ORDER — METFORMIN HYDROCHLORIDE 850 MG/1
1 TABLET ORAL
Qty: 0 | Refills: 0 | DISCHARGE

## 2020-01-01 RX ORDER — AZITHROMYCIN 500 MG/1
500 TABLET, FILM COATED ORAL ONCE
Refills: 0 | Status: COMPLETED | OUTPATIENT
Start: 2020-01-01 | End: 2020-01-01

## 2020-01-01 RX ORDER — PIPERACILLIN AND TAZOBACTAM 4; .5 G/20ML; G/20ML
3.38 INJECTION, POWDER, LYOPHILIZED, FOR SOLUTION INTRAVENOUS EVERY 8 HOURS
Refills: 0 | Status: DISCONTINUED | OUTPATIENT
Start: 2020-01-01 | End: 2020-01-01

## 2020-01-01 RX ORDER — PROPOFOL 10 MG/ML
10 INJECTION, EMULSION INTRAVENOUS
Qty: 1000 | Refills: 0 | Status: DISCONTINUED | OUTPATIENT
Start: 2020-01-01 | End: 2020-01-01

## 2020-01-01 RX ORDER — ALBUTEROL 90 UG/1
6 AEROSOL, METERED ORAL EVERY 4 HOURS
Refills: 0 | Status: COMPLETED | OUTPATIENT
Start: 2020-01-01 | End: 2021-03-02

## 2020-01-01 RX ORDER — MICAFUNGIN SODIUM 100 MG/1
100 INJECTION, POWDER, LYOPHILIZED, FOR SOLUTION INTRAVENOUS EVERY 24 HOURS
Refills: 0 | Status: DISCONTINUED | OUTPATIENT
Start: 2020-01-01 | End: 2020-01-01

## 2020-01-01 RX ORDER — SODIUM ZIRCONIUM CYCLOSILICATE 10 G/10G
10 POWDER, FOR SUSPENSION ORAL THREE TIMES A DAY
Refills: 0 | Status: COMPLETED | OUTPATIENT
Start: 2020-01-01 | End: 2020-01-01

## 2020-01-01 RX ORDER — CHLORHEXIDINE GLUCONATE 213 G/1000ML
1 SOLUTION TOPICAL DAILY
Refills: 0 | Status: DISCONTINUED | OUTPATIENT
Start: 2020-01-01 | End: 2020-01-01

## 2020-01-01 RX ORDER — FLUCONAZOLE 150 MG/1
800 TABLET ORAL ONCE
Refills: 0 | Status: COMPLETED | OUTPATIENT
Start: 2020-01-01 | End: 2020-01-01

## 2020-01-01 RX ORDER — LINEZOLID 600 MG/300ML
INJECTION, SOLUTION INTRAVENOUS
Refills: 0 | Status: COMPLETED | OUTPATIENT
Start: 2020-01-01 | End: 2020-01-01

## 2020-01-01 RX ORDER — SODIUM CHLORIDE 9 MG/ML
500 INJECTION, SOLUTION INTRAVENOUS
Refills: 0 | Status: DISCONTINUED | OUTPATIENT
Start: 2020-01-01 | End: 2020-01-01

## 2020-01-01 RX ORDER — SODIUM CHLORIDE 9 MG/ML
10 INJECTION INTRAMUSCULAR; INTRAVENOUS; SUBCUTANEOUS
Refills: 0 | Status: DISCONTINUED | OUTPATIENT
Start: 2020-01-01 | End: 2020-01-01

## 2020-01-01 RX ORDER — INSULIN HUMAN 100 [IU]/ML
10 INJECTION, SOLUTION SUBCUTANEOUS ONCE
Refills: 0 | Status: COMPLETED | OUTPATIENT
Start: 2020-01-01 | End: 2020-01-01

## 2020-01-01 RX ORDER — AMIODARONE HYDROCHLORIDE 400 MG/1
150 TABLET ORAL ONCE
Refills: 0 | Status: COMPLETED | OUTPATIENT
Start: 2020-01-01 | End: 2020-01-01

## 2020-01-01 RX ORDER — FLUCONAZOLE 150 MG/1
400 TABLET ORAL EVERY 24 HOURS
Refills: 0 | Status: COMPLETED | OUTPATIENT
Start: 2020-01-01 | End: 2020-01-01

## 2020-01-01 RX ORDER — VANCOMYCIN HCL 1 G
1000 VIAL (EA) INTRAVENOUS EVERY 12 HOURS
Refills: 0 | Status: DISCONTINUED | OUTPATIENT
Start: 2020-01-01 | End: 2020-01-01

## 2020-01-01 RX ORDER — CEFEPIME 1 G/1
1000 INJECTION, POWDER, FOR SOLUTION INTRAMUSCULAR; INTRAVENOUS EVERY 8 HOURS
Refills: 0 | Status: COMPLETED | OUTPATIENT
Start: 2020-01-01 | End: 2020-01-01

## 2020-01-01 RX ORDER — POTASSIUM PHOSPHATE, MONOBASIC POTASSIUM PHOSPHATE, DIBASIC 236; 224 MG/ML; MG/ML
15 INJECTION, SOLUTION INTRAVENOUS ONCE
Refills: 0 | Status: COMPLETED | OUTPATIENT
Start: 2020-01-01 | End: 2020-01-01

## 2020-01-01 RX ORDER — NOREPINEPHRINE BITARTRATE/D5W 8 MG/250ML
0.05 PLASTIC BAG, INJECTION (ML) INTRAVENOUS
Qty: 16 | Refills: 0 | Status: DISCONTINUED | OUTPATIENT
Start: 2020-01-01 | End: 2020-01-01

## 2020-01-01 RX ADMIN — Medication 50 GRAM(S): at 07:46

## 2020-01-01 RX ADMIN — CHLORHEXIDINE GLUCONATE 1 APPLICATION(S): 213 SOLUTION TOPICAL at 04:54

## 2020-01-01 RX ADMIN — Medication 8: at 13:59

## 2020-01-01 RX ADMIN — HEPARIN SODIUM 0 UNIT(S)/HR: 5000 INJECTION INTRAVENOUS; SUBCUTANEOUS at 07:07

## 2020-01-01 RX ADMIN — SENNA PLUS 10 MILLILITER(S): 8.6 TABLET ORAL at 11:09

## 2020-01-01 RX ADMIN — CHLORHEXIDINE GLUCONATE 15 MILLILITER(S): 213 SOLUTION TOPICAL at 16:50

## 2020-01-01 RX ADMIN — INSULIN GLARGINE 50 UNIT(S): 100 INJECTION, SOLUTION SUBCUTANEOUS at 22:06

## 2020-01-01 RX ADMIN — CHLORHEXIDINE GLUCONATE 15 MILLILITER(S): 213 SOLUTION TOPICAL at 16:36

## 2020-01-01 RX ADMIN — INSULIN GLARGINE 60 UNIT(S): 100 INJECTION, SOLUTION SUBCUTANEOUS at 22:43

## 2020-01-01 RX ADMIN — CEFEPIME 100 MILLIGRAM(S): 1 INJECTION, POWDER, FOR SOLUTION INTRAMUSCULAR; INTRAVENOUS at 16:00

## 2020-01-01 RX ADMIN — MEROPENEM 100 MILLIGRAM(S): 1 INJECTION INTRAVENOUS at 13:53

## 2020-01-01 RX ADMIN — Medication 12: at 18:12

## 2020-01-01 RX ADMIN — Medication 4: at 05:38

## 2020-01-01 RX ADMIN — ALBUTEROL 6 PUFF(S): 90 AEROSOL, METERED ORAL at 20:33

## 2020-01-01 RX ADMIN — ALBUTEROL 6 PUFF(S): 90 AEROSOL, METERED ORAL at 17:30

## 2020-01-01 RX ADMIN — FLUCONAZOLE 100 MILLIGRAM(S): 150 TABLET ORAL at 13:25

## 2020-01-01 RX ADMIN — DEXMEDETOMIDINE HYDROCHLORIDE IN 0.9% SODIUM CHLORIDE 9.42 MICROGRAM(S)/KG/HR: 4 INJECTION INTRAVENOUS at 02:24

## 2020-01-01 RX ADMIN — Medication 12: at 01:58

## 2020-01-01 RX ADMIN — Medication 40 MILLIEQUIVALENT(S): at 07:46

## 2020-01-01 RX ADMIN — HEPARIN SODIUM 800 UNIT(S)/HR: 5000 INJECTION INTRAVENOUS; SUBCUTANEOUS at 15:44

## 2020-01-01 RX ADMIN — DEXMEDETOMIDINE HYDROCHLORIDE IN 0.9% SODIUM CHLORIDE 9.42 MICROGRAM(S)/KG/HR: 4 INJECTION INTRAVENOUS at 18:04

## 2020-01-01 RX ADMIN — SODIUM CHLORIDE 42 MILLILITER(S): 9 INJECTION, SOLUTION INTRAVENOUS at 17:44

## 2020-01-01 RX ADMIN — DEXMEDETOMIDINE HYDROCHLORIDE IN 0.9% SODIUM CHLORIDE 9.42 MICROGRAM(S)/KG/HR: 4 INJECTION INTRAVENOUS at 13:58

## 2020-01-01 RX ADMIN — HEPARIN SODIUM 1800 UNIT(S)/HR: 5000 INJECTION INTRAVENOUS; SUBCUTANEOUS at 00:36

## 2020-01-01 RX ADMIN — Medication 10 MILLIGRAM(S): at 22:41

## 2020-01-01 RX ADMIN — PROPOFOL 7.54 MICROGRAM(S)/KG/MIN: 10 INJECTION, EMULSION INTRAVENOUS at 03:00

## 2020-01-01 RX ADMIN — Medication 8: at 05:41

## 2020-01-01 RX ADMIN — HEPARIN SODIUM 1800 UNIT(S)/HR: 5000 INJECTION INTRAVENOUS; SUBCUTANEOUS at 12:09

## 2020-01-01 RX ADMIN — INSULIN GLARGINE 40 UNIT(S): 100 INJECTION, SOLUTION SUBCUTANEOUS at 12:17

## 2020-01-01 RX ADMIN — AMIODARONE HYDROCHLORIDE 200 MILLIGRAM(S): 400 TABLET ORAL at 05:59

## 2020-01-01 RX ADMIN — Medication 20 MILLIGRAM(S): at 11:02

## 2020-01-01 RX ADMIN — HEPARIN SODIUM 1500 UNIT(S)/HR: 5000 INJECTION INTRAVENOUS; SUBCUTANEOUS at 05:52

## 2020-01-01 RX ADMIN — SODIUM CHLORIDE 1000 MILLILITER(S): 9 INJECTION, SOLUTION INTRAVENOUS at 13:00

## 2020-01-01 RX ADMIN — Medication 50 MILLIGRAM(S): at 04:28

## 2020-01-01 RX ADMIN — Medication 650 MILLIGRAM(S): at 19:44

## 2020-01-01 RX ADMIN — Medication 81 MILLIGRAM(S): at 10:50

## 2020-01-01 RX ADMIN — HYDROMORPHONE HYDROCHLORIDE 2 MILLIGRAM(S): 2 INJECTION INTRAMUSCULAR; INTRAVENOUS; SUBCUTANEOUS at 02:40

## 2020-01-01 RX ADMIN — ALBUTEROL 6 PUFF(S): 90 AEROSOL, METERED ORAL at 03:40

## 2020-01-01 RX ADMIN — HEPARIN SODIUM 1200 UNIT(S)/HR: 5000 INJECTION INTRAVENOUS; SUBCUTANEOUS at 03:27

## 2020-01-01 RX ADMIN — Medication 650 MILLIGRAM(S): at 21:05

## 2020-01-01 RX ADMIN — SENNA PLUS 10 MILLILITER(S): 8.6 TABLET ORAL at 10:12

## 2020-01-01 RX ADMIN — CHLORHEXIDINE GLUCONATE 1 APPLICATION(S): 213 SOLUTION TOPICAL at 11:16

## 2020-01-01 RX ADMIN — CHLORHEXIDINE GLUCONATE 15 MILLILITER(S): 213 SOLUTION TOPICAL at 18:13

## 2020-01-01 RX ADMIN — HEPARIN SODIUM 2600 UNIT(S)/HR: 5000 INJECTION INTRAVENOUS; SUBCUTANEOUS at 05:29

## 2020-01-01 RX ADMIN — CHLORHEXIDINE GLUCONATE 15 MILLILITER(S): 213 SOLUTION TOPICAL at 04:01

## 2020-01-01 RX ADMIN — CHLORHEXIDINE GLUCONATE 1 APPLICATION(S): 213 SOLUTION TOPICAL at 11:12

## 2020-01-01 RX ADMIN — CHLORHEXIDINE GLUCONATE 1 APPLICATION(S): 213 SOLUTION TOPICAL at 13:44

## 2020-01-01 RX ADMIN — INSULIN GLARGINE 60 UNIT(S): 100 INJECTION, SOLUTION SUBCUTANEOUS at 21:53

## 2020-01-01 RX ADMIN — ALBUTEROL 6 PUFF(S): 90 AEROSOL, METERED ORAL at 16:26

## 2020-01-01 RX ADMIN — ALBUTEROL 6 PUFF(S): 90 AEROSOL, METERED ORAL at 03:18

## 2020-01-01 RX ADMIN — CHLORHEXIDINE GLUCONATE 15 MILLILITER(S): 213 SOLUTION TOPICAL at 17:13

## 2020-01-01 RX ADMIN — Medication 81 MILLIGRAM(S): at 12:29

## 2020-01-01 RX ADMIN — INSULIN GLARGINE 30 UNIT(S): 100 INJECTION, SOLUTION SUBCUTANEOUS at 22:28

## 2020-01-01 RX ADMIN — CHLORHEXIDINE GLUCONATE 15 MILLILITER(S): 213 SOLUTION TOPICAL at 05:59

## 2020-01-01 RX ADMIN — CHLORHEXIDINE GLUCONATE 15 MILLILITER(S): 213 SOLUTION TOPICAL at 06:29

## 2020-01-01 RX ADMIN — CHLORHEXIDINE GLUCONATE 15 MILLILITER(S): 213 SOLUTION TOPICAL at 04:57

## 2020-01-01 RX ADMIN — CHLORHEXIDINE GLUCONATE 15 MILLILITER(S): 213 SOLUTION TOPICAL at 05:46

## 2020-01-01 RX ADMIN — DEXMEDETOMIDINE HYDROCHLORIDE IN 0.9% SODIUM CHLORIDE 9.42 MICROGRAM(S)/KG/HR: 4 INJECTION INTRAVENOUS at 18:39

## 2020-01-01 RX ADMIN — DEXMEDETOMIDINE HYDROCHLORIDE IN 0.9% SODIUM CHLORIDE 6.28 MICROGRAM(S)/KG/HR: 4 INJECTION INTRAVENOUS at 20:26

## 2020-01-01 RX ADMIN — Medication 650 MILLIGRAM(S): at 22:12

## 2020-01-01 RX ADMIN — CHLORHEXIDINE GLUCONATE 15 MILLILITER(S): 213 SOLUTION TOPICAL at 17:20

## 2020-01-01 RX ADMIN — Medication 81 MILLIGRAM(S): at 08:44

## 2020-01-01 RX ADMIN — Medication 81 MILLIGRAM(S): at 11:43

## 2020-01-01 RX ADMIN — CHLORHEXIDINE GLUCONATE 1 APPLICATION(S): 213 SOLUTION TOPICAL at 12:48

## 2020-01-01 RX ADMIN — HYDROMORPHONE HYDROCHLORIDE 1 MILLIGRAM(S): 2 INJECTION INTRAMUSCULAR; INTRAVENOUS; SUBCUTANEOUS at 03:21

## 2020-01-01 RX ADMIN — LINEZOLID 300 MILLIGRAM(S): 600 INJECTION, SOLUTION INTRAVENOUS at 20:53

## 2020-01-01 RX ADMIN — Medication 8: at 13:19

## 2020-01-01 RX ADMIN — Medication 4: at 21:19

## 2020-01-01 RX ADMIN — HEPARIN SODIUM 7500 UNIT(S): 5000 INJECTION INTRAVENOUS; SUBCUTANEOUS at 05:14

## 2020-01-01 RX ADMIN — PANTOPRAZOLE SODIUM 40 MILLIGRAM(S): 20 TABLET, DELAYED RELEASE ORAL at 11:55

## 2020-01-01 RX ADMIN — FLUCONAZOLE 100 MILLIGRAM(S): 150 TABLET ORAL at 14:55

## 2020-01-01 RX ADMIN — HEPARIN SODIUM 2600 UNIT(S)/HR: 5000 INJECTION INTRAVENOUS; SUBCUTANEOUS at 04:58

## 2020-01-01 RX ADMIN — Medication 5 MILLIGRAM(S): at 23:00

## 2020-01-01 RX ADMIN — CHLORHEXIDINE GLUCONATE 1 APPLICATION(S): 213 SOLUTION TOPICAL at 06:40

## 2020-01-01 RX ADMIN — DEXMEDETOMIDINE HYDROCHLORIDE IN 0.9% SODIUM CHLORIDE 9.42 MICROGRAM(S)/KG/HR: 4 INJECTION INTRAVENOUS at 10:48

## 2020-01-01 RX ADMIN — Medication 4: at 23:24

## 2020-01-01 RX ADMIN — Medication 0.5 MILLIGRAM(S): at 18:13

## 2020-01-01 RX ADMIN — Medication 0.5 MILLIGRAM(S): at 17:21

## 2020-01-01 RX ADMIN — CEFEPIME 100 MILLIGRAM(S): 1 INJECTION, POWDER, FOR SOLUTION INTRAMUSCULAR; INTRAVENOUS at 22:41

## 2020-01-01 RX ADMIN — CHLORHEXIDINE GLUCONATE 15 MILLILITER(S): 213 SOLUTION TOPICAL at 05:07

## 2020-01-01 RX ADMIN — CHLORHEXIDINE GLUCONATE 15 MILLILITER(S): 213 SOLUTION TOPICAL at 17:08

## 2020-01-01 RX ADMIN — Medication 8: at 06:39

## 2020-01-01 RX ADMIN — Medication 1 PATCH: at 08:00

## 2020-01-01 RX ADMIN — CEFEPIME 100 MILLIGRAM(S): 1 INJECTION, POWDER, FOR SOLUTION INTRAMUSCULAR; INTRAVENOUS at 23:02

## 2020-01-01 RX ADMIN — Medication 3: at 06:09

## 2020-01-01 RX ADMIN — HEPARIN SODIUM 2300 UNIT(S)/HR: 5000 INJECTION INTRAVENOUS; SUBCUTANEOUS at 06:10

## 2020-01-01 RX ADMIN — AMLODIPINE BESYLATE 5 MILLIGRAM(S): 2.5 TABLET ORAL at 04:00

## 2020-01-01 RX ADMIN — HEPARIN SODIUM 0 UNIT(S)/HR: 5000 INJECTION INTRAVENOUS; SUBCUTANEOUS at 17:30

## 2020-01-01 RX ADMIN — CEFEPIME 100 MILLIGRAM(S): 1 INJECTION, POWDER, FOR SOLUTION INTRAMUSCULAR; INTRAVENOUS at 05:22

## 2020-01-01 RX ADMIN — SENNA PLUS 10 MILLILITER(S): 8.6 TABLET ORAL at 11:24

## 2020-01-01 RX ADMIN — Medication 81 MILLIGRAM(S): at 13:06

## 2020-01-01 RX ADMIN — Medication 6: at 17:30

## 2020-01-01 RX ADMIN — CHLORHEXIDINE GLUCONATE 1 APPLICATION(S): 213 SOLUTION TOPICAL at 11:24

## 2020-01-01 RX ADMIN — CHLORHEXIDINE GLUCONATE 15 MILLILITER(S): 213 SOLUTION TOPICAL at 05:24

## 2020-01-01 RX ADMIN — CEFEPIME 100 MILLIGRAM(S): 1 INJECTION, POWDER, FOR SOLUTION INTRAMUSCULAR; INTRAVENOUS at 05:37

## 2020-01-01 RX ADMIN — Medication 81 MILLIGRAM(S): at 12:21

## 2020-01-01 RX ADMIN — Medication 650 MILLIGRAM(S): at 12:21

## 2020-01-01 RX ADMIN — INSULIN GLARGINE 60 UNIT(S): 100 INJECTION, SOLUTION SUBCUTANEOUS at 01:25

## 2020-01-01 RX ADMIN — FENTANYL CITRATE 25 MICROGRAM(S): 50 INJECTION INTRAVENOUS at 22:46

## 2020-01-01 RX ADMIN — ALBUTEROL 6 PUFF(S): 90 AEROSOL, METERED ORAL at 10:10

## 2020-01-01 RX ADMIN — Medication 400 MILLIGRAM(S): at 12:22

## 2020-01-01 RX ADMIN — CHLORHEXIDINE GLUCONATE 15 MILLILITER(S): 213 SOLUTION TOPICAL at 17:21

## 2020-01-01 RX ADMIN — CEFEPIME 100 MILLIGRAM(S): 1 INJECTION, POWDER, FOR SOLUTION INTRAMUSCULAR; INTRAVENOUS at 13:05

## 2020-01-01 RX ADMIN — INSULIN HUMAN 6 UNIT(S)/HR: 100 INJECTION, SOLUTION SUBCUTANEOUS at 03:38

## 2020-01-01 RX ADMIN — INSULIN GLARGINE 40 UNIT(S): 100 INJECTION, SOLUTION SUBCUTANEOUS at 22:12

## 2020-01-01 RX ADMIN — CHLORHEXIDINE GLUCONATE 15 MILLILITER(S): 213 SOLUTION TOPICAL at 04:33

## 2020-01-01 RX ADMIN — DEXMEDETOMIDINE HYDROCHLORIDE IN 0.9% SODIUM CHLORIDE 9.42 MICROGRAM(S)/KG/HR: 4 INJECTION INTRAVENOUS at 05:49

## 2020-01-01 RX ADMIN — FLUCONAZOLE 100 MILLIGRAM(S): 150 TABLET ORAL at 12:15

## 2020-01-01 RX ADMIN — Medication 0.5 MILLIGRAM(S): at 12:54

## 2020-01-01 RX ADMIN — CHLORHEXIDINE GLUCONATE 15 MILLILITER(S): 213 SOLUTION TOPICAL at 16:48

## 2020-01-01 RX ADMIN — FLUCONAZOLE 100 MILLIGRAM(S): 150 TABLET ORAL at 13:05

## 2020-01-01 RX ADMIN — ALBUTEROL 6 PUFF(S): 90 AEROSOL, METERED ORAL at 02:44

## 2020-01-01 RX ADMIN — ALBUTEROL 6 PUFF(S): 90 AEROSOL, METERED ORAL at 09:09

## 2020-01-01 RX ADMIN — INSULIN GLARGINE 40 UNIT(S): 100 INJECTION, SOLUTION SUBCUTANEOUS at 11:08

## 2020-01-01 RX ADMIN — CEFEPIME 100 MILLIGRAM(S): 1 INJECTION, POWDER, FOR SOLUTION INTRAMUSCULAR; INTRAVENOUS at 04:57

## 2020-01-01 RX ADMIN — Medication 14: at 23:43

## 2020-01-01 RX ADMIN — HEPARIN SODIUM 7500 UNIT(S): 5000 INJECTION INTRAVENOUS; SUBCUTANEOUS at 06:29

## 2020-01-01 RX ADMIN — HEPARIN SODIUM 1500 UNIT(S)/HR: 5000 INJECTION INTRAVENOUS; SUBCUTANEOUS at 18:36

## 2020-01-01 RX ADMIN — ALBUTEROL 6 PUFF(S): 90 AEROSOL, METERED ORAL at 16:08

## 2020-01-01 RX ADMIN — ALBUTEROL 6 PUFF(S): 90 AEROSOL, METERED ORAL at 15:15

## 2020-01-01 RX ADMIN — AMIODARONE HYDROCHLORIDE 200 MILLIGRAM(S): 400 TABLET ORAL at 04:00

## 2020-01-01 RX ADMIN — SODIUM CHLORIDE 500 MILLILITER(S): 9 INJECTION, SOLUTION INTRAVENOUS at 08:47

## 2020-01-01 RX ADMIN — Medication 300 MILLIGRAM(S): at 04:54

## 2020-01-01 RX ADMIN — PANTOPRAZOLE SODIUM 40 MILLIGRAM(S): 20 TABLET, DELAYED RELEASE ORAL at 10:04

## 2020-01-01 RX ADMIN — CHLORHEXIDINE GLUCONATE 15 MILLILITER(S): 213 SOLUTION TOPICAL at 04:27

## 2020-01-01 RX ADMIN — Medication 4: at 23:02

## 2020-01-01 RX ADMIN — HEPARIN SODIUM 2300 UNIT(S)/HR: 5000 INJECTION INTRAVENOUS; SUBCUTANEOUS at 06:53

## 2020-01-01 RX ADMIN — DEXMEDETOMIDINE HYDROCHLORIDE IN 0.9% SODIUM CHLORIDE 9.42 MICROGRAM(S)/KG/HR: 4 INJECTION INTRAVENOUS at 11:56

## 2020-01-01 RX ADMIN — CHLORHEXIDINE GLUCONATE 15 MILLILITER(S): 213 SOLUTION TOPICAL at 16:30

## 2020-01-01 RX ADMIN — LINEZOLID 300 MILLIGRAM(S): 600 INJECTION, SOLUTION INTRAVENOUS at 18:11

## 2020-01-01 RX ADMIN — ALBUTEROL 6 PUFF(S): 90 AEROSOL, METERED ORAL at 09:54

## 2020-01-01 RX ADMIN — CHLORHEXIDINE GLUCONATE 15 MILLILITER(S): 213 SOLUTION TOPICAL at 17:16

## 2020-01-01 RX ADMIN — HEPARIN SODIUM 7500 UNIT(S): 5000 INJECTION INTRAVENOUS; SUBCUTANEOUS at 21:39

## 2020-01-01 RX ADMIN — ALBUTEROL 6 PUFF(S): 90 AEROSOL, METERED ORAL at 20:36

## 2020-01-01 RX ADMIN — ALBUTEROL 6 PUFF(S): 90 AEROSOL, METERED ORAL at 09:36

## 2020-01-01 RX ADMIN — HEPARIN SODIUM 1500 UNIT(S)/HR: 5000 INJECTION INTRAVENOUS; SUBCUTANEOUS at 07:02

## 2020-01-01 RX ADMIN — DEXMEDETOMIDINE HYDROCHLORIDE IN 0.9% SODIUM CHLORIDE 9.42 MICROGRAM(S)/KG/HR: 4 INJECTION INTRAVENOUS at 13:06

## 2020-01-01 RX ADMIN — AMIODARONE HYDROCHLORIDE 200 MILLIGRAM(S): 400 TABLET ORAL at 12:53

## 2020-01-01 RX ADMIN — INSULIN GLARGINE 50 UNIT(S): 100 INJECTION, SOLUTION SUBCUTANEOUS at 21:33

## 2020-01-01 RX ADMIN — Medication 10 MILLIGRAM(S): at 00:30

## 2020-01-01 RX ADMIN — Medication 81 MILLIGRAM(S): at 11:24

## 2020-01-01 RX ADMIN — DEXMEDETOMIDINE HYDROCHLORIDE IN 0.9% SODIUM CHLORIDE 9.42 MICROGRAM(S)/KG/HR: 4 INJECTION INTRAVENOUS at 17:13

## 2020-01-01 RX ADMIN — HYDROMORPHONE HYDROCHLORIDE 1 MILLIGRAM(S): 2 INJECTION INTRAMUSCULAR; INTRAVENOUS; SUBCUTANEOUS at 20:14

## 2020-01-01 RX ADMIN — Medication 81 MILLIGRAM(S): at 11:55

## 2020-01-01 RX ADMIN — ALBUTEROL 6 PUFF(S): 90 AEROSOL, METERED ORAL at 10:20

## 2020-01-01 RX ADMIN — LINEZOLID 300 MILLIGRAM(S): 600 INJECTION, SOLUTION INTRAVENOUS at 16:50

## 2020-01-01 RX ADMIN — Medication 81 MILLIGRAM(S): at 13:02

## 2020-01-01 RX ADMIN — CHLORHEXIDINE GLUCONATE 15 MILLILITER(S): 213 SOLUTION TOPICAL at 17:33

## 2020-01-01 RX ADMIN — HEPARIN SODIUM 7500 UNIT(S): 5000 INJECTION INTRAVENOUS; SUBCUTANEOUS at 11:56

## 2020-01-01 RX ADMIN — Medication 12: at 23:11

## 2020-01-01 RX ADMIN — Medication 400 MILLIGRAM(S): at 22:46

## 2020-01-01 RX ADMIN — AMIODARONE HYDROCHLORIDE 200 MILLIGRAM(S): 400 TABLET ORAL at 05:07

## 2020-01-01 RX ADMIN — CHLORHEXIDINE GLUCONATE 15 MILLILITER(S): 213 SOLUTION TOPICAL at 05:23

## 2020-01-01 RX ADMIN — Medication 4: at 06:45

## 2020-01-01 RX ADMIN — SODIUM ZIRCONIUM CYCLOSILICATE 10 GRAM(S): 10 POWDER, FOR SUSPENSION ORAL at 05:06

## 2020-01-01 RX ADMIN — INSULIN GLARGINE 30 UNIT(S): 100 INJECTION, SOLUTION SUBCUTANEOUS at 22:10

## 2020-01-01 RX ADMIN — Medication 0.5 MILLIGRAM(S): at 05:28

## 2020-01-01 RX ADMIN — Medication 200 MILLIGRAM(S): at 01:50

## 2020-01-01 RX ADMIN — HEPARIN SODIUM 1500 UNIT(S)/HR: 5000 INJECTION INTRAVENOUS; SUBCUTANEOUS at 06:16

## 2020-01-01 RX ADMIN — ALBUTEROL 6 PUFF(S): 90 AEROSOL, METERED ORAL at 19:51

## 2020-01-01 RX ADMIN — INSULIN GLARGINE 40 UNIT(S): 100 INJECTION, SOLUTION SUBCUTANEOUS at 08:57

## 2020-01-01 RX ADMIN — Medication 1 PATCH: at 20:00

## 2020-01-01 RX ADMIN — HEPARIN SODIUM 2300 UNIT(S)/HR: 5000 INJECTION INTRAVENOUS; SUBCUTANEOUS at 13:52

## 2020-01-01 RX ADMIN — ALBUTEROL 6 PUFF(S): 90 AEROSOL, METERED ORAL at 09:39

## 2020-01-01 RX ADMIN — CEFEPIME 100 MILLIGRAM(S): 1 INJECTION, POWDER, FOR SOLUTION INTRAMUSCULAR; INTRAVENOUS at 12:09

## 2020-01-01 RX ADMIN — CHLORHEXIDINE GLUCONATE 15 MILLILITER(S): 213 SOLUTION TOPICAL at 05:02

## 2020-01-01 RX ADMIN — Medication 650 MILLIGRAM(S): at 16:37

## 2020-01-01 RX ADMIN — Medication 10 MILLIGRAM(S): at 09:49

## 2020-01-01 RX ADMIN — SODIUM CHLORIDE 1000 MILLILITER(S): 9 INJECTION, SOLUTION INTRAVENOUS at 15:49

## 2020-01-01 RX ADMIN — Medication 1 PATCH: at 10:36

## 2020-01-01 RX ADMIN — HEPARIN SODIUM 1100 UNIT(S)/HR: 5000 INJECTION INTRAVENOUS; SUBCUTANEOUS at 08:11

## 2020-01-01 RX ADMIN — INSULIN GLARGINE 50 UNIT(S): 100 INJECTION, SOLUTION SUBCUTANEOUS at 11:06

## 2020-01-01 RX ADMIN — INSULIN GLARGINE 40 UNIT(S): 100 INJECTION, SOLUTION SUBCUTANEOUS at 00:33

## 2020-01-01 RX ADMIN — CHLORHEXIDINE GLUCONATE 15 MILLILITER(S): 213 SOLUTION TOPICAL at 05:32

## 2020-01-01 RX ADMIN — Medication 10 MILLIGRAM(S): at 21:29

## 2020-01-01 RX ADMIN — Medication 8: at 05:46

## 2020-01-01 RX ADMIN — CHLORHEXIDINE GLUCONATE 1 APPLICATION(S): 213 SOLUTION TOPICAL at 08:43

## 2020-01-01 RX ADMIN — Medication 300 MILLIGRAM(S): at 06:53

## 2020-01-01 RX ADMIN — HEPARIN SODIUM 5000 UNIT(S): 5000 INJECTION INTRAVENOUS; SUBCUTANEOUS at 05:02

## 2020-01-01 RX ADMIN — CHLORHEXIDINE GLUCONATE 1 APPLICATION(S): 213 SOLUTION TOPICAL at 11:44

## 2020-01-01 RX ADMIN — ALBUTEROL 6 PUFF(S): 90 AEROSOL, METERED ORAL at 11:05

## 2020-01-01 RX ADMIN — CHLORHEXIDINE GLUCONATE 15 MILLILITER(S): 213 SOLUTION TOPICAL at 06:48

## 2020-01-01 RX ADMIN — Medication 650 MILLIGRAM(S): at 00:59

## 2020-01-01 RX ADMIN — Medication 102 MILLIGRAM(S): at 04:57

## 2020-01-01 RX ADMIN — PANTOPRAZOLE SODIUM 40 MILLIGRAM(S): 20 TABLET, DELAYED RELEASE ORAL at 11:12

## 2020-01-01 RX ADMIN — CHLORHEXIDINE GLUCONATE 15 MILLILITER(S): 213 SOLUTION TOPICAL at 14:01

## 2020-01-01 RX ADMIN — Medication 50 MILLIGRAM(S): at 18:03

## 2020-01-01 RX ADMIN — HYDROMORPHONE HYDROCHLORIDE 1 MG/HR: 2 INJECTION INTRAMUSCULAR; INTRAVENOUS; SUBCUTANEOUS at 17:21

## 2020-01-01 RX ADMIN — INSULIN GLARGINE 60 UNIT(S): 100 INJECTION, SOLUTION SUBCUTANEOUS at 23:00

## 2020-01-01 RX ADMIN — INSULIN HUMAN 2 UNIT(S)/HR: 100 INJECTION, SOLUTION SUBCUTANEOUS at 23:54

## 2020-01-01 RX ADMIN — PANTOPRAZOLE SODIUM 40 MILLIGRAM(S): 20 TABLET, DELAYED RELEASE ORAL at 12:45

## 2020-01-01 RX ADMIN — CHLORHEXIDINE GLUCONATE 1 APPLICATION(S): 213 SOLUTION TOPICAL at 12:29

## 2020-01-01 RX ADMIN — Medication 81 MILLIGRAM(S): at 11:06

## 2020-01-01 RX ADMIN — INSULIN GLARGINE 45 UNIT(S): 100 INJECTION, SOLUTION SUBCUTANEOUS at 22:18

## 2020-01-01 RX ADMIN — ALBUTEROL 6 PUFF(S): 90 AEROSOL, METERED ORAL at 02:48

## 2020-01-01 RX ADMIN — AMIODARONE HYDROCHLORIDE 200 MILLIGRAM(S): 400 TABLET ORAL at 06:04

## 2020-01-01 RX ADMIN — HEPARIN SODIUM 1500 UNIT(S)/HR: 5000 INJECTION INTRAVENOUS; SUBCUTANEOUS at 17:54

## 2020-01-01 RX ADMIN — ALBUTEROL 6 PUFF(S): 90 AEROSOL, METERED ORAL at 22:23

## 2020-01-01 RX ADMIN — Medication 200 MILLIGRAM(S): at 13:09

## 2020-01-01 RX ADMIN — Medication 12: at 10:04

## 2020-01-01 RX ADMIN — ALBUTEROL 6 PUFF(S): 90 AEROSOL, METERED ORAL at 22:36

## 2020-01-01 RX ADMIN — Medication 4: at 05:46

## 2020-01-01 RX ADMIN — CEFEPIME 100 MILLIGRAM(S): 1 INJECTION, POWDER, FOR SOLUTION INTRAMUSCULAR; INTRAVENOUS at 04:04

## 2020-01-01 RX ADMIN — CHLORHEXIDINE GLUCONATE 15 MILLILITER(S): 213 SOLUTION TOPICAL at 17:11

## 2020-01-01 RX ADMIN — Medication 0.5 MILLIGRAM(S): at 17:18

## 2020-01-01 RX ADMIN — CEFEPIME 100 MILLIGRAM(S): 1 INJECTION, POWDER, FOR SOLUTION INTRAMUSCULAR; INTRAVENOUS at 13:52

## 2020-01-01 RX ADMIN — Medication 4: at 23:51

## 2020-01-01 RX ADMIN — HEPARIN SODIUM 2600 UNIT(S)/HR: 5000 INJECTION INTRAVENOUS; SUBCUTANEOUS at 20:56

## 2020-01-01 RX ADMIN — ALBUTEROL 6 PUFF(S): 90 AEROSOL, METERED ORAL at 03:11

## 2020-01-01 RX ADMIN — ALBUTEROL 6 PUFF(S): 90 AEROSOL, METERED ORAL at 20:21

## 2020-01-01 RX ADMIN — INSULIN GLARGINE 40 UNIT(S): 100 INJECTION, SOLUTION SUBCUTANEOUS at 23:28

## 2020-01-01 RX ADMIN — ALBUTEROL 6 PUFF(S): 90 AEROSOL, METERED ORAL at 02:18

## 2020-01-01 RX ADMIN — AMLODIPINE BESYLATE 5 MILLIGRAM(S): 2.5 TABLET ORAL at 05:47

## 2020-01-01 RX ADMIN — Medication 4: at 16:59

## 2020-01-01 RX ADMIN — AMIODARONE HYDROCHLORIDE 200 MILLIGRAM(S): 400 TABLET ORAL at 06:39

## 2020-01-01 RX ADMIN — Medication 81 MILLIGRAM(S): at 10:40

## 2020-01-01 RX ADMIN — Medication 4: at 02:58

## 2020-01-01 RX ADMIN — PROPOFOL 3.95 MICROGRAM(S)/KG/MIN: 10 INJECTION, EMULSION INTRAVENOUS at 08:25

## 2020-01-01 RX ADMIN — LINEZOLID 300 MILLIGRAM(S): 600 INJECTION, SOLUTION INTRAVENOUS at 05:47

## 2020-01-01 RX ADMIN — Medication 10 MILLIGRAM(S): at 11:10

## 2020-01-01 RX ADMIN — Medication 4: at 02:42

## 2020-01-01 RX ADMIN — Medication 12: at 00:26

## 2020-01-01 RX ADMIN — AMIODARONE HYDROCHLORIDE 200 MILLIGRAM(S): 400 TABLET ORAL at 05:02

## 2020-01-01 RX ADMIN — Medication 3 UNIT(S): at 00:30

## 2020-01-01 RX ADMIN — CHLORHEXIDINE GLUCONATE 15 MILLILITER(S): 213 SOLUTION TOPICAL at 17:46

## 2020-01-01 RX ADMIN — FENTANYL CITRATE 50 MICROGRAM(S): 50 INJECTION INTRAVENOUS at 23:49

## 2020-01-01 RX ADMIN — DEXMEDETOMIDINE HYDROCHLORIDE IN 0.9% SODIUM CHLORIDE 9.42 MICROGRAM(S)/KG/HR: 4 INJECTION INTRAVENOUS at 07:21

## 2020-01-01 RX ADMIN — Medication 200 GRAM(S): at 06:32

## 2020-01-01 RX ADMIN — AMIODARONE HYDROCHLORIDE 200 MILLIGRAM(S): 400 TABLET ORAL at 05:43

## 2020-01-01 RX ADMIN — FLUCONAZOLE 100 MILLIGRAM(S): 150 TABLET ORAL at 13:37

## 2020-01-01 RX ADMIN — LINEZOLID 300 MILLIGRAM(S): 600 INJECTION, SOLUTION INTRAVENOUS at 12:09

## 2020-01-01 RX ADMIN — HEPARIN SODIUM 7500 UNIT(S): 5000 INJECTION INTRAVENOUS; SUBCUTANEOUS at 10:42

## 2020-01-01 RX ADMIN — INSULIN GLARGINE 60 UNIT(S): 100 INJECTION, SOLUTION SUBCUTANEOUS at 23:01

## 2020-01-01 RX ADMIN — ALBUTEROL 6 PUFF(S): 90 AEROSOL, METERED ORAL at 09:59

## 2020-01-01 RX ADMIN — SENNA PLUS 10 MILLILITER(S): 8.6 TABLET ORAL at 10:16

## 2020-01-01 RX ADMIN — LINEZOLID 300 MILLIGRAM(S): 600 INJECTION, SOLUTION INTRAVENOUS at 05:51

## 2020-01-01 RX ADMIN — CHLORHEXIDINE GLUCONATE 1 APPLICATION(S): 213 SOLUTION TOPICAL at 14:41

## 2020-01-01 RX ADMIN — CHLORHEXIDINE GLUCONATE 15 MILLILITER(S): 213 SOLUTION TOPICAL at 06:25

## 2020-01-01 RX ADMIN — Medication 81 MILLIGRAM(S): at 11:25

## 2020-01-01 RX ADMIN — Medication 650 MILLIGRAM(S): at 00:00

## 2020-01-01 RX ADMIN — ALBUTEROL 6 PUFF(S): 90 AEROSOL, METERED ORAL at 16:06

## 2020-01-01 RX ADMIN — Medication 81 MILLIGRAM(S): at 11:45

## 2020-01-01 RX ADMIN — CHLORHEXIDINE GLUCONATE 1 APPLICATION(S): 213 SOLUTION TOPICAL at 10:12

## 2020-01-01 RX ADMIN — ALBUTEROL 6 PUFF(S): 90 AEROSOL, METERED ORAL at 20:43

## 2020-01-01 RX ADMIN — FENTANYL CITRATE 50 MICROGRAM(S): 50 INJECTION INTRAVENOUS at 22:28

## 2020-01-01 RX ADMIN — FENTANYL CITRATE 50 MICROGRAM(S): 50 INJECTION INTRAVENOUS at 03:16

## 2020-01-01 RX ADMIN — HEPARIN SODIUM 7500 UNIT(S): 5000 INJECTION INTRAVENOUS; SUBCUTANEOUS at 13:03

## 2020-01-01 RX ADMIN — Medication 4: at 17:35

## 2020-01-01 RX ADMIN — MIDAZOLAM HYDROCHLORIDE 2 MILLIGRAM(S): 1 INJECTION, SOLUTION INTRAMUSCULAR; INTRAVENOUS at 08:07

## 2020-01-01 RX ADMIN — SENNA PLUS 10 MILLILITER(S): 8.6 TABLET ORAL at 12:29

## 2020-01-01 RX ADMIN — DEXMEDETOMIDINE HYDROCHLORIDE IN 0.9% SODIUM CHLORIDE 9.42 MICROGRAM(S)/KG/HR: 4 INJECTION INTRAVENOUS at 15:03

## 2020-01-01 RX ADMIN — PIPERACILLIN AND TAZOBACTAM 25 GRAM(S): 4; .5 INJECTION, POWDER, LYOPHILIZED, FOR SOLUTION INTRAVENOUS at 14:09

## 2020-01-01 RX ADMIN — LINEZOLID 300 MILLIGRAM(S): 600 INJECTION, SOLUTION INTRAVENOUS at 05:21

## 2020-01-01 RX ADMIN — SENNA PLUS 10 MILLILITER(S): 8.6 TABLET ORAL at 11:07

## 2020-01-01 RX ADMIN — ALBUTEROL 6 PUFF(S): 90 AEROSOL, METERED ORAL at 16:52

## 2020-01-01 RX ADMIN — LINEZOLID 300 MILLIGRAM(S): 600 INJECTION, SOLUTION INTRAVENOUS at 04:01

## 2020-01-01 RX ADMIN — CHLORHEXIDINE GLUCONATE 15 MILLILITER(S): 213 SOLUTION TOPICAL at 05:13

## 2020-01-01 RX ADMIN — Medication 81 MILLIGRAM(S): at 12:24

## 2020-01-01 RX ADMIN — HEPARIN SODIUM 7500 UNIT(S): 5000 INJECTION INTRAVENOUS; SUBCUTANEOUS at 12:22

## 2020-01-01 RX ADMIN — HEPARIN SODIUM 7500 UNIT(S): 5000 INJECTION INTRAVENOUS; SUBCUTANEOUS at 22:28

## 2020-01-01 RX ADMIN — AMIODARONE HYDROCHLORIDE 200 MILLIGRAM(S): 400 TABLET ORAL at 05:28

## 2020-01-01 RX ADMIN — Medication 650 MILLIGRAM(S): at 13:00

## 2020-01-01 RX ADMIN — LINEZOLID 300 MILLIGRAM(S): 600 INJECTION, SOLUTION INTRAVENOUS at 18:39

## 2020-01-01 RX ADMIN — CHLORHEXIDINE GLUCONATE 15 MILLILITER(S): 213 SOLUTION TOPICAL at 06:30

## 2020-01-01 RX ADMIN — HEPARIN SODIUM 5000 UNIT(S): 5000 INJECTION INTRAVENOUS; SUBCUTANEOUS at 06:32

## 2020-01-01 RX ADMIN — INSULIN GLARGINE 50 UNIT(S): 100 INJECTION, SOLUTION SUBCUTANEOUS at 08:30

## 2020-01-01 RX ADMIN — Medication 650 MILLIGRAM(S): at 06:05

## 2020-01-01 RX ADMIN — Medication 1 PATCH: at 17:23

## 2020-01-01 RX ADMIN — AMLODIPINE BESYLATE 5 MILLIGRAM(S): 2.5 TABLET ORAL at 05:37

## 2020-01-01 RX ADMIN — ALBUTEROL 6 PUFF(S): 90 AEROSOL, METERED ORAL at 00:51

## 2020-01-01 RX ADMIN — Medication 8: at 11:43

## 2020-01-01 RX ADMIN — SENNA PLUS 10 MILLILITER(S): 8.6 TABLET ORAL at 11:55

## 2020-01-01 RX ADMIN — AMIODARONE HYDROCHLORIDE 200 MILLIGRAM(S): 400 TABLET ORAL at 06:30

## 2020-01-01 RX ADMIN — Medication 12: at 06:01

## 2020-01-01 RX ADMIN — ALBUTEROL 6 PUFF(S): 90 AEROSOL, METERED ORAL at 16:16

## 2020-01-01 RX ADMIN — CHLORHEXIDINE GLUCONATE 1 APPLICATION(S): 213 SOLUTION TOPICAL at 11:52

## 2020-01-01 RX ADMIN — INSULIN HUMAN 6 UNIT(S)/HR: 100 INJECTION, SOLUTION SUBCUTANEOUS at 08:30

## 2020-01-01 RX ADMIN — AMIODARONE HYDROCHLORIDE 200 MILLIGRAM(S): 400 TABLET ORAL at 05:37

## 2020-01-01 RX ADMIN — Medication 10: at 17:20

## 2020-01-01 RX ADMIN — ALBUTEROL 6 PUFF(S): 90 AEROSOL, METERED ORAL at 05:06

## 2020-01-01 RX ADMIN — HEPARIN SODIUM 5000 UNIT(S): 5000 INJECTION INTRAVENOUS; SUBCUTANEOUS at 05:24

## 2020-01-01 RX ADMIN — Medication 4: at 05:07

## 2020-01-01 RX ADMIN — HYDROMORPHONE HYDROCHLORIDE 1 MG/HR: 2 INJECTION INTRAMUSCULAR; INTRAVENOUS; SUBCUTANEOUS at 21:40

## 2020-01-01 RX ADMIN — HEPARIN SODIUM 2100 UNIT(S)/HR: 5000 INJECTION INTRAVENOUS; SUBCUTANEOUS at 13:11

## 2020-01-01 RX ADMIN — FAMOTIDINE 20 MILLIGRAM(S): 10 INJECTION INTRAVENOUS at 05:24

## 2020-01-01 RX ADMIN — INSULIN GLARGINE 50 UNIT(S): 100 INJECTION, SOLUTION SUBCUTANEOUS at 22:04

## 2020-01-01 RX ADMIN — HEPARIN SODIUM 7500 UNIT(S): 5000 INJECTION INTRAVENOUS; SUBCUTANEOUS at 16:19

## 2020-01-01 RX ADMIN — Medication 81 MILLIGRAM(S): at 13:10

## 2020-01-01 RX ADMIN — PROPOFOL 7.54 MICROGRAM(S)/KG/MIN: 10 INJECTION, EMULSION INTRAVENOUS at 04:58

## 2020-01-01 RX ADMIN — HEPARIN SODIUM 7500 UNIT(S): 5000 INJECTION INTRAVENOUS; SUBCUTANEOUS at 05:44

## 2020-01-01 RX ADMIN — DEXMEDETOMIDINE HYDROCHLORIDE IN 0.9% SODIUM CHLORIDE 9.42 MICROGRAM(S)/KG/HR: 4 INJECTION INTRAVENOUS at 06:47

## 2020-01-01 RX ADMIN — PROPOFOL 7.54 MICROGRAM(S)/KG/MIN: 10 INJECTION, EMULSION INTRAVENOUS at 21:13

## 2020-01-01 RX ADMIN — CHLORHEXIDINE GLUCONATE 15 MILLILITER(S): 213 SOLUTION TOPICAL at 06:45

## 2020-01-01 RX ADMIN — Medication 10 MILLIGRAM(S): at 07:08

## 2020-01-01 RX ADMIN — HYDROMORPHONE HYDROCHLORIDE 1 MG/HR: 2 INJECTION INTRAMUSCULAR; INTRAVENOUS; SUBCUTANEOUS at 05:50

## 2020-01-01 RX ADMIN — SENNA PLUS 10 MILLILITER(S): 8.6 TABLET ORAL at 11:15

## 2020-01-01 RX ADMIN — AMIODARONE HYDROCHLORIDE 200 MILLIGRAM(S): 400 TABLET ORAL at 05:47

## 2020-01-01 RX ADMIN — INSULIN GLARGINE 40 UNIT(S): 100 INJECTION, SOLUTION SUBCUTANEOUS at 09:30

## 2020-01-01 RX ADMIN — SODIUM CHLORIDE 50 MILLILITER(S): 9 INJECTION, SOLUTION INTRAVENOUS at 04:36

## 2020-01-01 RX ADMIN — Medication 10 MILLIGRAM(S): at 00:22

## 2020-01-01 RX ADMIN — SODIUM ZIRCONIUM CYCLOSILICATE 10 GRAM(S): 10 POWDER, FOR SUSPENSION ORAL at 15:28

## 2020-01-01 RX ADMIN — HEPARIN SODIUM 7500 UNIT(S): 5000 INJECTION INTRAVENOUS; SUBCUTANEOUS at 13:58

## 2020-01-01 RX ADMIN — AMIODARONE HYDROCHLORIDE 200 MILLIGRAM(S): 400 TABLET ORAL at 04:53

## 2020-01-01 RX ADMIN — Medication 10 MILLIGRAM(S): at 20:39

## 2020-01-01 RX ADMIN — Medication 5 MILLIGRAM(S): at 21:46

## 2020-01-01 RX ADMIN — CHLORHEXIDINE GLUCONATE 15 MILLILITER(S): 213 SOLUTION TOPICAL at 16:58

## 2020-01-01 RX ADMIN — CHLORHEXIDINE GLUCONATE 15 MILLILITER(S): 213 SOLUTION TOPICAL at 06:51

## 2020-01-01 RX ADMIN — CEFTRIAXONE 100 MILLIGRAM(S): 500 INJECTION, POWDER, FOR SOLUTION INTRAMUSCULAR; INTRAVENOUS at 22:48

## 2020-01-01 RX ADMIN — MIDAZOLAM HYDROCHLORIDE 4 MILLIGRAM(S): 1 INJECTION, SOLUTION INTRAMUSCULAR; INTRAVENOUS at 12:32

## 2020-01-01 RX ADMIN — CHLORHEXIDINE GLUCONATE 15 MILLILITER(S): 213 SOLUTION TOPICAL at 17:36

## 2020-01-01 RX ADMIN — ALBUTEROL 6 PUFF(S): 90 AEROSOL, METERED ORAL at 17:15

## 2020-01-01 RX ADMIN — Medication 5 MILLIGRAM(S): at 21:47

## 2020-01-01 RX ADMIN — AMIODARONE HYDROCHLORIDE 200 MILLIGRAM(S): 400 TABLET ORAL at 05:23

## 2020-01-01 RX ADMIN — Medication 102 MILLIGRAM(S): at 05:14

## 2020-01-01 RX ADMIN — PROPOFOL 7.54 MICROGRAM(S)/KG/MIN: 10 INJECTION, EMULSION INTRAVENOUS at 01:43

## 2020-01-01 RX ADMIN — Medication 100 MILLIEQUIVALENT(S): at 08:12

## 2020-01-01 RX ADMIN — ALBUTEROL 6 PUFF(S): 90 AEROSOL, METERED ORAL at 15:50

## 2020-01-01 RX ADMIN — SODIUM CHLORIDE 1000 MILLILITER(S): 9 INJECTION INTRAMUSCULAR; INTRAVENOUS; SUBCUTANEOUS at 16:08

## 2020-01-01 RX ADMIN — CEFEPIME 100 MILLIGRAM(S): 1 INJECTION, POWDER, FOR SOLUTION INTRAMUSCULAR; INTRAVENOUS at 05:32

## 2020-01-01 RX ADMIN — Medication 6: at 14:12

## 2020-01-01 RX ADMIN — Medication 1 PATCH: at 05:45

## 2020-01-01 RX ADMIN — SODIUM CHLORIDE 50 MILLILITER(S): 9 INJECTION, SOLUTION INTRAVENOUS at 02:00

## 2020-01-01 RX ADMIN — DEXMEDETOMIDINE HYDROCHLORIDE IN 0.9% SODIUM CHLORIDE 9.42 MICROGRAM(S)/KG/HR: 4 INJECTION INTRAVENOUS at 08:58

## 2020-01-01 RX ADMIN — SODIUM CHLORIDE 500 MILLILITER(S): 9 INJECTION, SOLUTION INTRAVENOUS at 18:00

## 2020-01-01 RX ADMIN — HEPARIN SODIUM 2300 UNIT(S)/HR: 5000 INJECTION INTRAVENOUS; SUBCUTANEOUS at 01:42

## 2020-01-01 RX ADMIN — DEXMEDETOMIDINE HYDROCHLORIDE IN 0.9% SODIUM CHLORIDE 9.42 MICROGRAM(S)/KG/HR: 4 INJECTION INTRAVENOUS at 21:00

## 2020-01-01 RX ADMIN — PROPOFOL 7.54 MICROGRAM(S)/KG/MIN: 10 INJECTION, EMULSION INTRAVENOUS at 12:42

## 2020-01-01 RX ADMIN — HEPARIN SODIUM 0 UNIT(S)/HR: 5000 INJECTION INTRAVENOUS; SUBCUTANEOUS at 07:52

## 2020-01-01 RX ADMIN — INSULIN GLARGINE 40 UNIT(S): 100 INJECTION, SOLUTION SUBCUTANEOUS at 13:30

## 2020-01-01 RX ADMIN — PROPOFOL 7.54 MICROGRAM(S)/KG/MIN: 10 INJECTION, EMULSION INTRAVENOUS at 09:24

## 2020-01-01 RX ADMIN — INSULIN GLARGINE 60 UNIT(S): 100 INJECTION, SOLUTION SUBCUTANEOUS at 22:15

## 2020-01-01 RX ADMIN — Medication 4: at 16:30

## 2020-01-01 RX ADMIN — Medication 4: at 02:35

## 2020-01-01 RX ADMIN — Medication 50 MILLIGRAM(S): at 05:48

## 2020-01-01 RX ADMIN — CHLORHEXIDINE GLUCONATE 1 APPLICATION(S): 213 SOLUTION TOPICAL at 12:20

## 2020-01-01 RX ADMIN — DEXMEDETOMIDINE HYDROCHLORIDE IN 0.9% SODIUM CHLORIDE 9.42 MICROGRAM(S)/KG/HR: 4 INJECTION INTRAVENOUS at 05:14

## 2020-01-01 RX ADMIN — MIDAZOLAM HYDROCHLORIDE 2 MILLIGRAM(S): 1 INJECTION, SOLUTION INTRAMUSCULAR; INTRAVENOUS at 02:35

## 2020-01-01 RX ADMIN — Medication 8: at 12:24

## 2020-01-01 RX ADMIN — LINEZOLID 300 MILLIGRAM(S): 600 INJECTION, SOLUTION INTRAVENOUS at 04:28

## 2020-01-01 RX ADMIN — CHLORHEXIDINE GLUCONATE 1 APPLICATION(S): 213 SOLUTION TOPICAL at 12:45

## 2020-01-01 RX ADMIN — Medication 200 MILLIGRAM(S): at 02:57

## 2020-01-01 RX ADMIN — DEXMEDETOMIDINE HYDROCHLORIDE IN 0.9% SODIUM CHLORIDE 9.42 MICROGRAM(S)/KG/HR: 4 INJECTION INTRAVENOUS at 00:40

## 2020-01-01 RX ADMIN — Medication 1 PATCH: at 06:25

## 2020-01-01 RX ADMIN — Medication 0.5 MILLIGRAM(S): at 17:12

## 2020-01-01 RX ADMIN — DEXMEDETOMIDINE HYDROCHLORIDE IN 0.9% SODIUM CHLORIDE 9.42 MICROGRAM(S)/KG/HR: 4 INJECTION INTRAVENOUS at 03:19

## 2020-01-01 RX ADMIN — CHLORHEXIDINE GLUCONATE 15 MILLILITER(S): 213 SOLUTION TOPICAL at 18:48

## 2020-01-01 RX ADMIN — CHLORHEXIDINE GLUCONATE 1 APPLICATION(S): 213 SOLUTION TOPICAL at 10:34

## 2020-01-01 RX ADMIN — ALBUTEROL 6 PUFF(S): 90 AEROSOL, METERED ORAL at 13:46

## 2020-01-01 RX ADMIN — INSULIN GLARGINE 60 UNIT(S): 100 INJECTION, SOLUTION SUBCUTANEOUS at 23:23

## 2020-01-01 RX ADMIN — FLUCONAZOLE 100 MILLIGRAM(S): 150 TABLET ORAL at 14:00

## 2020-01-01 RX ADMIN — FENTANYL CITRATE 50 MICROGRAM(S): 50 INJECTION INTRAVENOUS at 22:16

## 2020-01-01 RX ADMIN — HYDROMORPHONE HYDROCHLORIDE 2 MILLIGRAM(S): 2 INJECTION INTRAMUSCULAR; INTRAVENOUS; SUBCUTANEOUS at 02:35

## 2020-01-01 RX ADMIN — CHLORHEXIDINE GLUCONATE 15 MILLILITER(S): 213 SOLUTION TOPICAL at 05:06

## 2020-01-01 RX ADMIN — SODIUM CHLORIDE 42 MILLILITER(S): 9 INJECTION, SOLUTION INTRAVENOUS at 21:19

## 2020-01-01 RX ADMIN — ALBUTEROL 6 PUFF(S): 90 AEROSOL, METERED ORAL at 03:51

## 2020-01-01 RX ADMIN — ALBUTEROL 6 PUFF(S): 90 AEROSOL, METERED ORAL at 22:48

## 2020-01-01 RX ADMIN — Medication 650 MILLIGRAM(S): at 05:00

## 2020-01-01 RX ADMIN — CHLORHEXIDINE GLUCONATE 1 APPLICATION(S): 213 SOLUTION TOPICAL at 12:36

## 2020-01-01 RX ADMIN — Medication 81 MILLIGRAM(S): at 10:12

## 2020-01-01 RX ADMIN — INSULIN GLARGINE 30 UNIT(S): 100 INJECTION, SOLUTION SUBCUTANEOUS at 23:38

## 2020-01-01 RX ADMIN — MIDAZOLAM HYDROCHLORIDE 4 MILLIGRAM(S): 1 INJECTION, SOLUTION INTRAMUSCULAR; INTRAVENOUS at 21:00

## 2020-01-01 RX ADMIN — DEXMEDETOMIDINE HYDROCHLORIDE IN 0.9% SODIUM CHLORIDE 9.42 MICROGRAM(S)/KG/HR: 4 INJECTION INTRAVENOUS at 09:49

## 2020-01-01 RX ADMIN — Medication 4: at 00:15

## 2020-01-01 RX ADMIN — Medication 81 MILLIGRAM(S): at 11:10

## 2020-01-01 RX ADMIN — Medication 4: at 00:34

## 2020-01-01 RX ADMIN — Medication 650 MILLIGRAM(S): at 01:43

## 2020-01-01 RX ADMIN — CHLORHEXIDINE GLUCONATE 15 MILLILITER(S): 213 SOLUTION TOPICAL at 05:38

## 2020-01-01 RX ADMIN — LINEZOLID 300 MILLIGRAM(S): 600 INJECTION, SOLUTION INTRAVENOUS at 05:49

## 2020-01-01 RX ADMIN — DEXMEDETOMIDINE HYDROCHLORIDE IN 0.9% SODIUM CHLORIDE 6.28 MICROGRAM(S)/KG/HR: 4 INJECTION INTRAVENOUS at 13:50

## 2020-01-01 RX ADMIN — CHLORHEXIDINE GLUCONATE 15 MILLILITER(S): 213 SOLUTION TOPICAL at 15:23

## 2020-01-01 RX ADMIN — CHLORHEXIDINE GLUCONATE 1 APPLICATION(S): 213 SOLUTION TOPICAL at 10:43

## 2020-01-01 RX ADMIN — Medication 650 MILLIGRAM(S): at 00:41

## 2020-01-01 RX ADMIN — INSULIN GLARGINE 40 UNIT(S): 100 INJECTION, SOLUTION SUBCUTANEOUS at 13:01

## 2020-01-01 RX ADMIN — PROPOFOL 7.54 MICROGRAM(S)/KG/MIN: 10 INJECTION, EMULSION INTRAVENOUS at 16:34

## 2020-01-01 RX ADMIN — CHLORHEXIDINE GLUCONATE 15 MILLILITER(S): 213 SOLUTION TOPICAL at 05:28

## 2020-01-01 RX ADMIN — DEXMEDETOMIDINE HYDROCHLORIDE IN 0.9% SODIUM CHLORIDE 9.42 MICROGRAM(S)/KG/HR: 4 INJECTION INTRAVENOUS at 19:45

## 2020-01-01 RX ADMIN — Medication 200 MILLIGRAM(S): at 01:59

## 2020-01-01 RX ADMIN — HEPARIN SODIUM 7500 UNIT(S): 5000 INJECTION INTRAVENOUS; SUBCUTANEOUS at 23:00

## 2020-01-01 RX ADMIN — SENNA PLUS 10 MILLILITER(S): 8.6 TABLET ORAL at 13:58

## 2020-01-01 RX ADMIN — AMLODIPINE BESYLATE 5 MILLIGRAM(S): 2.5 TABLET ORAL at 06:45

## 2020-01-01 RX ADMIN — HYDROMORPHONE HYDROCHLORIDE 0.5 MG/HR: 2 INJECTION INTRAMUSCULAR; INTRAVENOUS; SUBCUTANEOUS at 17:04

## 2020-01-01 RX ADMIN — PIPERACILLIN AND TAZOBACTAM 25 GRAM(S): 4; .5 INJECTION, POWDER, LYOPHILIZED, FOR SOLUTION INTRAVENOUS at 13:58

## 2020-01-01 RX ADMIN — CEFEPIME 100 MILLIGRAM(S): 1 INJECTION, POWDER, FOR SOLUTION INTRAMUSCULAR; INTRAVENOUS at 04:47

## 2020-01-01 RX ADMIN — ALBUTEROL 6 PUFF(S): 90 AEROSOL, METERED ORAL at 05:55

## 2020-01-01 RX ADMIN — Medication 8: at 18:02

## 2020-01-01 RX ADMIN — CHLORHEXIDINE GLUCONATE 15 MILLILITER(S): 213 SOLUTION TOPICAL at 05:25

## 2020-01-01 RX ADMIN — AMIODARONE HYDROCHLORIDE 200 MILLIGRAM(S): 400 TABLET ORAL at 03:57

## 2020-01-01 RX ADMIN — CHLORHEXIDINE GLUCONATE 1 APPLICATION(S): 213 SOLUTION TOPICAL at 11:45

## 2020-01-01 RX ADMIN — Medication 81 MILLIGRAM(S): at 12:19

## 2020-01-01 RX ADMIN — Medication 8: at 10:20

## 2020-01-01 RX ADMIN — HEPARIN SODIUM 7500 UNIT(S): 5000 INJECTION INTRAVENOUS; SUBCUTANEOUS at 06:45

## 2020-01-01 RX ADMIN — CHLORHEXIDINE GLUCONATE 1 APPLICATION(S): 213 SOLUTION TOPICAL at 10:50

## 2020-01-01 RX ADMIN — Medication 650 MILLIGRAM(S): at 06:00

## 2020-01-01 RX ADMIN — PIPERACILLIN AND TAZOBACTAM 200 GRAM(S): 4; .5 INJECTION, POWDER, LYOPHILIZED, FOR SOLUTION INTRAVENOUS at 11:21

## 2020-01-01 RX ADMIN — Medication 650 MILLIGRAM(S): at 05:30

## 2020-01-01 RX ADMIN — Medication 1 PATCH: at 19:00

## 2020-01-01 RX ADMIN — DEXMEDETOMIDINE HYDROCHLORIDE IN 0.9% SODIUM CHLORIDE 9.42 MICROGRAM(S)/KG/HR: 4 INJECTION INTRAVENOUS at 12:32

## 2020-01-01 RX ADMIN — FLUCONAZOLE 100 MILLIGRAM(S): 150 TABLET ORAL at 13:01

## 2020-01-01 RX ADMIN — ALBUTEROL 6 PUFF(S): 90 AEROSOL, METERED ORAL at 21:24

## 2020-01-01 RX ADMIN — HEPARIN SODIUM 7500 UNIT(S): 5000 INJECTION INTRAVENOUS; SUBCUTANEOUS at 21:41

## 2020-01-01 RX ADMIN — HYDROMORPHONE HYDROCHLORIDE 2 MILLIGRAM(S): 2 INJECTION INTRAMUSCULAR; INTRAVENOUS; SUBCUTANEOUS at 19:57

## 2020-01-01 RX ADMIN — Medication 0.5 MILLIGRAM(S): at 18:48

## 2020-01-01 RX ADMIN — Medication 12: at 22:15

## 2020-01-01 RX ADMIN — Medication 102 MILLIGRAM(S): at 05:58

## 2020-01-01 RX ADMIN — CHLORHEXIDINE GLUCONATE 1 APPLICATION(S): 213 SOLUTION TOPICAL at 14:00

## 2020-01-01 RX ADMIN — SODIUM ZIRCONIUM CYCLOSILICATE 10 GRAM(S): 10 POWDER, FOR SUSPENSION ORAL at 22:28

## 2020-01-01 RX ADMIN — Medication 650 MILLIGRAM(S): at 11:45

## 2020-01-01 RX ADMIN — Medication 0.5 MILLIGRAM(S): at 06:39

## 2020-01-01 RX ADMIN — ALBUTEROL 6 PUFF(S): 90 AEROSOL, METERED ORAL at 04:26

## 2020-01-01 RX ADMIN — Medication 8: at 23:55

## 2020-01-01 RX ADMIN — HEPARIN SODIUM 7500 UNIT(S): 5000 INJECTION INTRAVENOUS; SUBCUTANEOUS at 06:30

## 2020-01-01 RX ADMIN — CHLORHEXIDINE GLUCONATE 1 APPLICATION(S): 213 SOLUTION TOPICAL at 11:26

## 2020-01-01 RX ADMIN — ALBUTEROL 6 PUFF(S): 90 AEROSOL, METERED ORAL at 11:46

## 2020-01-01 RX ADMIN — Medication 12.1 MICROGRAM(S)/KG/MIN: at 05:48

## 2020-01-01 RX ADMIN — CEFEPIME 100 MILLIGRAM(S): 1 INJECTION, POWDER, FOR SOLUTION INTRAMUSCULAR; INTRAVENOUS at 22:00

## 2020-01-01 RX ADMIN — Medication 81 MILLIGRAM(S): at 12:45

## 2020-01-01 RX ADMIN — ALBUTEROL 6 PUFF(S): 90 AEROSOL, METERED ORAL at 04:58

## 2020-01-01 RX ADMIN — PANTOPRAZOLE SODIUM 40 MILLIGRAM(S): 20 TABLET, DELAYED RELEASE ORAL at 11:17

## 2020-01-01 RX ADMIN — AMIODARONE HYDROCHLORIDE 200 MILLIGRAM(S): 400 TABLET ORAL at 05:06

## 2020-01-01 RX ADMIN — Medication 8: at 17:11

## 2020-01-01 RX ADMIN — Medication 8: at 06:47

## 2020-01-01 RX ADMIN — Medication 650 MILLIGRAM(S): at 17:45

## 2020-01-01 RX ADMIN — AMIODARONE HYDROCHLORIDE 200 MILLIGRAM(S): 400 TABLET ORAL at 05:45

## 2020-01-01 RX ADMIN — Medication 650 MILLIGRAM(S): at 14:55

## 2020-01-01 RX ADMIN — ALBUTEROL 6 PUFF(S): 90 AEROSOL, METERED ORAL at 21:30

## 2020-01-01 RX ADMIN — PROPOFOL 7.54 MICROGRAM(S)/KG/MIN: 10 INJECTION, EMULSION INTRAVENOUS at 18:32

## 2020-01-01 RX ADMIN — ALBUTEROL 6 PUFF(S): 90 AEROSOL, METERED ORAL at 14:34

## 2020-01-01 RX ADMIN — DEXMEDETOMIDINE HYDROCHLORIDE IN 0.9% SODIUM CHLORIDE 9.42 MICROGRAM(S)/KG/HR: 4 INJECTION INTRAVENOUS at 23:12

## 2020-01-01 RX ADMIN — ALBUTEROL 6 PUFF(S): 90 AEROSOL, METERED ORAL at 16:45

## 2020-01-01 RX ADMIN — ALBUTEROL 6 PUFF(S): 90 AEROSOL, METERED ORAL at 21:11

## 2020-01-01 RX ADMIN — INSULIN GLARGINE 60 UNIT(S): 100 INJECTION, SOLUTION SUBCUTANEOUS at 20:54

## 2020-01-01 RX ADMIN — INSULIN GLARGINE 40 UNIT(S): 100 INJECTION, SOLUTION SUBCUTANEOUS at 12:12

## 2020-01-01 RX ADMIN — CHLORHEXIDINE GLUCONATE 15 MILLILITER(S): 213 SOLUTION TOPICAL at 17:54

## 2020-01-01 RX ADMIN — INSULIN GLARGINE 50 UNIT(S): 100 INJECTION, SOLUTION SUBCUTANEOUS at 15:59

## 2020-01-01 RX ADMIN — Medication 5: at 17:08

## 2020-01-01 RX ADMIN — Medication 81 MILLIGRAM(S): at 11:49

## 2020-01-01 RX ADMIN — Medication 300 MILLIGRAM(S): at 06:54

## 2020-01-01 RX ADMIN — INSULIN GLARGINE 40 UNIT(S): 100 INJECTION, SOLUTION SUBCUTANEOUS at 09:43

## 2020-01-01 RX ADMIN — Medication 650 MILLIGRAM(S): at 20:57

## 2020-01-01 RX ADMIN — ALBUTEROL 6 PUFF(S): 90 AEROSOL, METERED ORAL at 09:10

## 2020-01-01 RX ADMIN — SENNA PLUS 10 MILLILITER(S): 8.6 TABLET ORAL at 11:43

## 2020-01-01 RX ADMIN — Medication 8: at 06:07

## 2020-01-01 RX ADMIN — ALBUTEROL 6 PUFF(S): 90 AEROSOL, METERED ORAL at 09:11

## 2020-01-01 RX ADMIN — Medication 50 MILLIGRAM(S): at 18:39

## 2020-01-01 RX ADMIN — HEPARIN SODIUM 7500 UNIT(S): 5000 INJECTION INTRAVENOUS; SUBCUTANEOUS at 05:07

## 2020-01-01 RX ADMIN — HEPARIN SODIUM 7500 UNIT(S): 5000 INJECTION INTRAVENOUS; SUBCUTANEOUS at 06:06

## 2020-01-01 RX ADMIN — PANTOPRAZOLE SODIUM 40 MILLIGRAM(S): 20 TABLET, DELAYED RELEASE ORAL at 12:35

## 2020-01-01 RX ADMIN — Medication 4: at 05:28

## 2020-01-01 RX ADMIN — AMLODIPINE BESYLATE 5 MILLIGRAM(S): 2.5 TABLET ORAL at 05:54

## 2020-01-01 RX ADMIN — HEPARIN SODIUM 7500 UNIT(S): 5000 INJECTION INTRAVENOUS; SUBCUTANEOUS at 11:47

## 2020-01-01 RX ADMIN — Medication 81 MILLIGRAM(S): at 14:40

## 2020-01-01 RX ADMIN — ALBUTEROL 6 PUFF(S): 90 AEROSOL, METERED ORAL at 21:16

## 2020-01-01 RX ADMIN — CHLORHEXIDINE GLUCONATE 1 APPLICATION(S): 213 SOLUTION TOPICAL at 11:54

## 2020-01-01 RX ADMIN — Medication 4: at 11:01

## 2020-01-01 RX ADMIN — Medication 0.5 MILLIGRAM(S): at 18:27

## 2020-01-01 RX ADMIN — AMIODARONE HYDROCHLORIDE 200 MILLIGRAM(S): 400 TABLET ORAL at 06:12

## 2020-01-01 RX ADMIN — HEPARIN SODIUM 1900 UNIT(S)/HR: 5000 INJECTION INTRAVENOUS; SUBCUTANEOUS at 08:51

## 2020-01-01 RX ADMIN — INSULIN GLARGINE 40 UNIT(S): 100 INJECTION, SOLUTION SUBCUTANEOUS at 07:51

## 2020-01-01 RX ADMIN — CHLORHEXIDINE GLUCONATE 15 MILLILITER(S): 213 SOLUTION TOPICAL at 06:39

## 2020-01-01 RX ADMIN — AMIODARONE HYDROCHLORIDE 200 MILLIGRAM(S): 400 TABLET ORAL at 05:32

## 2020-01-01 RX ADMIN — PROPOFOL 7.54 MICROGRAM(S)/KG/MIN: 10 INJECTION, EMULSION INTRAVENOUS at 02:50

## 2020-01-01 RX ADMIN — Medication 81 MILLIGRAM(S): at 10:59

## 2020-01-01 RX ADMIN — Medication 4: at 17:33

## 2020-01-01 RX ADMIN — CHLORHEXIDINE GLUCONATE 15 MILLILITER(S): 213 SOLUTION TOPICAL at 16:26

## 2020-01-01 RX ADMIN — ALBUTEROL 6 PUFF(S): 90 AEROSOL, METERED ORAL at 11:30

## 2020-01-01 RX ADMIN — CHLORHEXIDINE GLUCONATE 1 APPLICATION(S): 213 SOLUTION TOPICAL at 10:15

## 2020-01-01 RX ADMIN — ALBUTEROL 6 PUFF(S): 90 AEROSOL, METERED ORAL at 20:45

## 2020-01-01 RX ADMIN — INSULIN GLARGINE 25 UNIT(S): 100 INJECTION, SOLUTION SUBCUTANEOUS at 07:49

## 2020-01-01 RX ADMIN — INSULIN GLARGINE 50 UNIT(S): 100 INJECTION, SOLUTION SUBCUTANEOUS at 08:19

## 2020-01-01 RX ADMIN — PANTOPRAZOLE SODIUM 40 MILLIGRAM(S): 20 TABLET, DELAYED RELEASE ORAL at 12:48

## 2020-01-01 RX ADMIN — CHLORHEXIDINE GLUCONATE 1 APPLICATION(S): 213 SOLUTION TOPICAL at 11:09

## 2020-01-01 RX ADMIN — CHLORHEXIDINE GLUCONATE 15 MILLILITER(S): 213 SOLUTION TOPICAL at 05:14

## 2020-01-01 RX ADMIN — ALBUTEROL 6 PUFF(S): 90 AEROSOL, METERED ORAL at 10:07

## 2020-01-01 RX ADMIN — FENTANYL CITRATE 13.2 MICROGRAM(S)/KG/HR: 50 INJECTION INTRAVENOUS at 21:27

## 2020-01-01 RX ADMIN — AMIODARONE HYDROCHLORIDE 8 MG/MIN: 400 TABLET ORAL at 21:47

## 2020-01-01 RX ADMIN — CISATRACURIUM BESYLATE 23.7 MICROGRAM(S)/KG/MIN: 2 INJECTION INTRAVENOUS at 00:49

## 2020-01-01 RX ADMIN — AMIODARONE HYDROCHLORIDE 200 MILLIGRAM(S): 400 TABLET ORAL at 13:10

## 2020-01-01 RX ADMIN — Medication 81 MILLIGRAM(S): at 13:04

## 2020-01-01 RX ADMIN — PROPOFOL 7.54 MICROGRAM(S)/KG/MIN: 10 INJECTION, EMULSION INTRAVENOUS at 15:58

## 2020-01-01 RX ADMIN — Medication 4: at 17:07

## 2020-01-01 RX ADMIN — Medication 250 MILLIGRAM(S): at 19:48

## 2020-01-01 RX ADMIN — DEXMEDETOMIDINE HYDROCHLORIDE IN 0.9% SODIUM CHLORIDE 9.42 MICROGRAM(S)/KG/HR: 4 INJECTION INTRAVENOUS at 15:23

## 2020-01-01 RX ADMIN — INSULIN GLARGINE 30 UNIT(S): 100 INJECTION, SOLUTION SUBCUTANEOUS at 22:04

## 2020-01-01 RX ADMIN — CEFEPIME 100 MILLIGRAM(S): 1 INJECTION, POWDER, FOR SOLUTION INTRAMUSCULAR; INTRAVENOUS at 23:12

## 2020-01-01 RX ADMIN — ALBUTEROL 6 PUFF(S): 90 AEROSOL, METERED ORAL at 21:59

## 2020-01-01 RX ADMIN — ALBUTEROL 6 PUFF(S): 90 AEROSOL, METERED ORAL at 08:40

## 2020-01-01 RX ADMIN — CHLORHEXIDINE GLUCONATE 1 APPLICATION(S): 213 SOLUTION TOPICAL at 12:57

## 2020-01-01 RX ADMIN — HEPARIN SODIUM 2600 UNIT(S)/HR: 5000 INJECTION INTRAVENOUS; SUBCUTANEOUS at 06:16

## 2020-01-01 RX ADMIN — HEPARIN SODIUM 1100 UNIT(S)/HR: 5000 INJECTION INTRAVENOUS; SUBCUTANEOUS at 06:33

## 2020-01-01 RX ADMIN — INSULIN GLARGINE 40 UNIT(S): 100 INJECTION, SOLUTION SUBCUTANEOUS at 22:14

## 2020-01-01 RX ADMIN — HEPARIN SODIUM 10000 UNIT(S): 5000 INJECTION INTRAVENOUS; SUBCUTANEOUS at 23:12

## 2020-01-01 RX ADMIN — Medication 8: at 06:54

## 2020-01-01 RX ADMIN — DEXMEDETOMIDINE HYDROCHLORIDE IN 0.9% SODIUM CHLORIDE 9.42 MICROGRAM(S)/KG/HR: 4 INJECTION INTRAVENOUS at 20:23

## 2020-01-01 RX ADMIN — HEPARIN SODIUM 1800 UNIT(S)/HR: 5000 INJECTION INTRAVENOUS; SUBCUTANEOUS at 05:44

## 2020-01-01 RX ADMIN — DEXMEDETOMIDINE HYDROCHLORIDE IN 0.9% SODIUM CHLORIDE 9.42 MICROGRAM(S)/KG/HR: 4 INJECTION INTRAVENOUS at 14:45

## 2020-01-01 RX ADMIN — CEFEPIME 100 MILLIGRAM(S): 1 INJECTION, POWDER, FOR SOLUTION INTRAMUSCULAR; INTRAVENOUS at 21:12

## 2020-01-01 RX ADMIN — DEXMEDETOMIDINE HYDROCHLORIDE IN 0.9% SODIUM CHLORIDE 9.42 MICROGRAM(S)/KG/HR: 4 INJECTION INTRAVENOUS at 10:56

## 2020-01-01 RX ADMIN — AMIODARONE HYDROCHLORIDE 8 MG/MIN: 400 TABLET ORAL at 07:27

## 2020-01-01 RX ADMIN — CEFEPIME 100 MILLIGRAM(S): 1 INJECTION, POWDER, FOR SOLUTION INTRAMUSCULAR; INTRAVENOUS at 13:10

## 2020-01-01 RX ADMIN — PANTOPRAZOLE SODIUM 40 MILLIGRAM(S): 20 TABLET, DELAYED RELEASE ORAL at 11:26

## 2020-01-01 RX ADMIN — INSULIN HUMAN 2 UNIT(S)/HR: 100 INJECTION, SOLUTION SUBCUTANEOUS at 02:57

## 2020-01-01 RX ADMIN — Medication 10 MILLIGRAM(S): at 10:27

## 2020-01-01 RX ADMIN — AMIODARONE HYDROCHLORIDE 200 MILLIGRAM(S): 400 TABLET ORAL at 05:25

## 2020-01-01 RX ADMIN — ALTEPLASE 2 MILLIGRAM(S): KIT at 07:00

## 2020-01-01 RX ADMIN — INSULIN GLARGINE 60 UNIT(S): 100 INJECTION, SOLUTION SUBCUTANEOUS at 22:41

## 2020-01-01 RX ADMIN — DEXMEDETOMIDINE HYDROCHLORIDE IN 0.9% SODIUM CHLORIDE 9.42 MICROGRAM(S)/KG/HR: 4 INJECTION INTRAVENOUS at 01:46

## 2020-01-01 RX ADMIN — CHLORHEXIDINE GLUCONATE 15 MILLILITER(S): 213 SOLUTION TOPICAL at 16:19

## 2020-01-01 RX ADMIN — AMIODARONE HYDROCHLORIDE 200 MILLIGRAM(S): 400 TABLET ORAL at 04:27

## 2020-01-01 RX ADMIN — FENTANYL CITRATE 50 MICROGRAM(S): 50 INJECTION INTRAVENOUS at 11:01

## 2020-01-01 RX ADMIN — CHLORHEXIDINE GLUCONATE 15 MILLILITER(S): 213 SOLUTION TOPICAL at 18:03

## 2020-01-01 RX ADMIN — FENTANYL CITRATE 13.2 MICROGRAM(S)/KG/HR: 50 INJECTION INTRAVENOUS at 03:00

## 2020-01-01 RX ADMIN — PIPERACILLIN AND TAZOBACTAM 25 GRAM(S): 4; .5 INJECTION, POWDER, LYOPHILIZED, FOR SOLUTION INTRAVENOUS at 16:05

## 2020-01-01 RX ADMIN — SODIUM ZIRCONIUM CYCLOSILICATE 10 GRAM(S): 10 POWDER, FOR SUSPENSION ORAL at 11:56

## 2020-01-01 RX ADMIN — FENTANYL CITRATE 25 MICROGRAM(S): 50 INJECTION INTRAVENOUS at 21:20

## 2020-01-01 RX ADMIN — Medication 81 MILLIGRAM(S): at 13:58

## 2020-01-01 RX ADMIN — Medication 81 MILLIGRAM(S): at 12:35

## 2020-01-01 RX ADMIN — FLUCONAZOLE 100 MILLIGRAM(S): 150 TABLET ORAL at 13:44

## 2020-01-01 RX ADMIN — Medication 0.5 MILLIGRAM(S): at 06:04

## 2020-01-01 RX ADMIN — CEFEPIME 100 MILLIGRAM(S): 1 INJECTION, POWDER, FOR SOLUTION INTRAMUSCULAR; INTRAVENOUS at 05:13

## 2020-01-01 RX ADMIN — INSULIN GLARGINE 45 UNIT(S): 100 INJECTION, SOLUTION SUBCUTANEOUS at 09:27

## 2020-01-01 RX ADMIN — Medication 81 MILLIGRAM(S): at 12:47

## 2020-01-01 RX ADMIN — ALBUTEROL 6 PUFF(S): 90 AEROSOL, METERED ORAL at 20:38

## 2020-01-01 RX ADMIN — HEPARIN SODIUM 2100 UNIT(S)/HR: 5000 INJECTION INTRAVENOUS; SUBCUTANEOUS at 06:29

## 2020-01-01 RX ADMIN — SENNA PLUS 10 MILLILITER(S): 8.6 TABLET ORAL at 17:18

## 2020-01-01 RX ADMIN — Medication 8: at 22:26

## 2020-01-01 RX ADMIN — INSULIN GLARGINE 40 UNIT(S): 100 INJECTION, SOLUTION SUBCUTANEOUS at 23:12

## 2020-01-01 RX ADMIN — SENNA PLUS 10 MILLILITER(S): 8.6 TABLET ORAL at 11:18

## 2020-01-01 RX ADMIN — ALBUTEROL 6 PUFF(S): 90 AEROSOL, METERED ORAL at 12:42

## 2020-01-01 RX ADMIN — CEFEPIME 100 MILLIGRAM(S): 1 INJECTION, POWDER, FOR SOLUTION INTRAMUSCULAR; INTRAVENOUS at 13:16

## 2020-01-01 RX ADMIN — INSULIN GLARGINE 40 UNIT(S): 100 INJECTION, SOLUTION SUBCUTANEOUS at 08:28

## 2020-01-01 RX ADMIN — HEPARIN SODIUM 800 UNIT(S)/HR: 5000 INJECTION INTRAVENOUS; SUBCUTANEOUS at 23:05

## 2020-01-01 RX ADMIN — LINEZOLID 300 MILLIGRAM(S): 600 INJECTION, SOLUTION INTRAVENOUS at 06:46

## 2020-01-01 RX ADMIN — INSULIN GLARGINE 40 UNIT(S): 100 INJECTION, SOLUTION SUBCUTANEOUS at 06:50

## 2020-01-01 RX ADMIN — Medication 0.5 MILLIGRAM(S): at 17:08

## 2020-01-01 RX ADMIN — Medication 400 MILLIGRAM(S): at 21:20

## 2020-01-01 RX ADMIN — ALBUTEROL 6 PUFF(S): 90 AEROSOL, METERED ORAL at 03:22

## 2020-01-01 RX ADMIN — DEXMEDETOMIDINE HYDROCHLORIDE IN 0.9% SODIUM CHLORIDE 9.42 MICROGRAM(S)/KG/HR: 4 INJECTION INTRAVENOUS at 21:24

## 2020-01-01 RX ADMIN — ALBUTEROL 6 PUFF(S): 90 AEROSOL, METERED ORAL at 16:18

## 2020-01-01 RX ADMIN — CEFEPIME 100 MILLIGRAM(S): 1 INJECTION, POWDER, FOR SOLUTION INTRAMUSCULAR; INTRAVENOUS at 05:07

## 2020-01-01 RX ADMIN — Medication 8: at 22:16

## 2020-01-01 RX ADMIN — Medication 0.5 MILLIGRAM(S): at 17:44

## 2020-01-01 RX ADMIN — Medication 3 UNIT(S): at 21:00

## 2020-01-01 RX ADMIN — MICAFUNGIN SODIUM 105 MILLIGRAM(S): 100 INJECTION, POWDER, LYOPHILIZED, FOR SOLUTION INTRAVENOUS at 12:13

## 2020-01-01 RX ADMIN — CEFEPIME 100 MILLIGRAM(S): 1 INJECTION, POWDER, FOR SOLUTION INTRAMUSCULAR; INTRAVENOUS at 22:26

## 2020-01-01 RX ADMIN — INSULIN HUMAN 6 UNIT(S)/HR: 100 INJECTION, SOLUTION SUBCUTANEOUS at 18:26

## 2020-01-01 RX ADMIN — AMLODIPINE BESYLATE 5 MILLIGRAM(S): 2.5 TABLET ORAL at 05:07

## 2020-01-01 RX ADMIN — MEROPENEM 100 MILLIGRAM(S): 1 INJECTION INTRAVENOUS at 21:04

## 2020-01-01 RX ADMIN — INSULIN GLARGINE 40 UNIT(S): 100 INJECTION, SOLUTION SUBCUTANEOUS at 08:53

## 2020-01-01 RX ADMIN — Medication 102 MILLIGRAM(S): at 06:29

## 2020-01-01 RX ADMIN — PROPOFOL 7.54 MICROGRAM(S)/KG/MIN: 10 INJECTION, EMULSION INTRAVENOUS at 18:44

## 2020-01-01 RX ADMIN — AMLODIPINE BESYLATE 5 MILLIGRAM(S): 2.5 TABLET ORAL at 04:27

## 2020-01-01 RX ADMIN — CHLORHEXIDINE GLUCONATE 15 MILLILITER(S): 213 SOLUTION TOPICAL at 13:26

## 2020-01-01 RX ADMIN — INSULIN GLARGINE 45 UNIT(S): 100 INJECTION, SOLUTION SUBCUTANEOUS at 07:35

## 2020-01-01 RX ADMIN — CHLORHEXIDINE GLUCONATE 15 MILLILITER(S): 213 SOLUTION TOPICAL at 03:57

## 2020-01-01 RX ADMIN — PANTOPRAZOLE SODIUM 40 MILLIGRAM(S): 20 TABLET, DELAYED RELEASE ORAL at 11:44

## 2020-01-01 RX ADMIN — INSULIN HUMAN 6 UNIT(S): 100 INJECTION, SOLUTION SUBCUTANEOUS at 09:45

## 2020-01-01 RX ADMIN — SODIUM CHLORIDE 1000 MILLILITER(S): 9 INJECTION INTRAMUSCULAR; INTRAVENOUS; SUBCUTANEOUS at 10:31

## 2020-01-01 RX ADMIN — CEFEPIME 100 MILLIGRAM(S): 1 INJECTION, POWDER, FOR SOLUTION INTRAMUSCULAR; INTRAVENOUS at 15:00

## 2020-01-01 RX ADMIN — CEFEPIME 100 MILLIGRAM(S): 1 INJECTION, POWDER, FOR SOLUTION INTRAMUSCULAR; INTRAVENOUS at 14:04

## 2020-01-01 RX ADMIN — HEPARIN SODIUM 1800 UNIT(S)/HR: 5000 INJECTION INTRAVENOUS; SUBCUTANEOUS at 14:47

## 2020-01-01 RX ADMIN — PIPERACILLIN AND TAZOBACTAM 25 GRAM(S): 4; .5 INJECTION, POWDER, LYOPHILIZED, FOR SOLUTION INTRAVENOUS at 21:42

## 2020-01-01 RX ADMIN — Medication 650 MILLIGRAM(S): at 14:30

## 2020-01-01 RX ADMIN — ALBUTEROL 6 PUFF(S): 90 AEROSOL, METERED ORAL at 09:17

## 2020-01-01 RX ADMIN — Medication 4: at 23:19

## 2020-01-01 RX ADMIN — INSULIN GLARGINE 60 UNIT(S): 100 INJECTION, SOLUTION SUBCUTANEOUS at 07:56

## 2020-01-01 RX ADMIN — Medication 50 MILLIEQUIVALENT(S): at 08:44

## 2020-01-01 RX ADMIN — AMIODARONE HYDROCHLORIDE 200 MILLIGRAM(S): 400 TABLET ORAL at 05:54

## 2020-01-01 RX ADMIN — SODIUM CHLORIDE 50 MILLILITER(S): 9 INJECTION, SOLUTION INTRAVENOUS at 13:46

## 2020-01-01 RX ADMIN — CHLORHEXIDINE GLUCONATE 15 MILLILITER(S): 213 SOLUTION TOPICAL at 04:04

## 2020-01-01 RX ADMIN — Medication 4: at 11:47

## 2020-01-01 RX ADMIN — Medication 650 MILLIGRAM(S): at 00:07

## 2020-01-01 RX ADMIN — Medication 4: at 05:13

## 2020-01-01 RX ADMIN — DEXMEDETOMIDINE HYDROCHLORIDE IN 0.9% SODIUM CHLORIDE 9.42 MICROGRAM(S)/KG/HR: 4 INJECTION INTRAVENOUS at 04:00

## 2020-01-01 RX ADMIN — FLUCONAZOLE 100 MILLIGRAM(S): 150 TABLET ORAL at 13:17

## 2020-01-01 RX ADMIN — Medication 8: at 00:38

## 2020-01-01 RX ADMIN — CHLORHEXIDINE GLUCONATE 15 MILLILITER(S): 213 SOLUTION TOPICAL at 16:40

## 2020-01-01 RX ADMIN — ALBUTEROL 6 PUFF(S): 90 AEROSOL, METERED ORAL at 11:43

## 2020-01-01 RX ADMIN — HEPARIN SODIUM 7500 UNIT(S): 5000 INJECTION INTRAVENOUS; SUBCUTANEOUS at 23:18

## 2020-01-01 RX ADMIN — Medication 650 MILLIGRAM(S): at 05:06

## 2020-01-01 RX ADMIN — Medication 10 MILLIGRAM(S): at 05:28

## 2020-01-01 RX ADMIN — Medication 25 GRAM(S): at 11:30

## 2020-01-01 RX ADMIN — Medication 81 MILLIGRAM(S): at 10:16

## 2020-01-01 RX ADMIN — Medication 100 MILLIEQUIVALENT(S): at 07:46

## 2020-01-01 RX ADMIN — CHLORHEXIDINE GLUCONATE 15 MILLILITER(S): 213 SOLUTION TOPICAL at 04:59

## 2020-01-01 RX ADMIN — HYDROMORPHONE HYDROCHLORIDE 1 MILLIGRAM(S): 2 INJECTION INTRAMUSCULAR; INTRAVENOUS; SUBCUTANEOUS at 14:55

## 2020-01-01 RX ADMIN — DEXMEDETOMIDINE HYDROCHLORIDE IN 0.9% SODIUM CHLORIDE 9.42 MICROGRAM(S)/KG/HR: 4 INJECTION INTRAVENOUS at 20:14

## 2020-01-01 RX ADMIN — Medication 100 MILLIEQUIVALENT(S): at 09:00

## 2020-01-01 RX ADMIN — Medication 8: at 23:12

## 2020-01-01 RX ADMIN — Medication 0.5 MILLIGRAM(S): at 17:26

## 2020-01-01 RX ADMIN — Medication 81 MILLIGRAM(S): at 11:12

## 2020-01-01 RX ADMIN — FENTANYL CITRATE 50 MICROGRAM(S): 50 INJECTION INTRAVENOUS at 12:51

## 2020-01-01 RX ADMIN — HEPARIN SODIUM 7500 UNIT(S): 5000 INJECTION INTRAVENOUS; SUBCUTANEOUS at 06:51

## 2020-01-01 RX ADMIN — ALBUTEROL 6 PUFF(S): 90 AEROSOL, METERED ORAL at 12:08

## 2020-01-01 RX ADMIN — Medication 50 MILLIGRAM(S): at 17:37

## 2020-01-01 RX ADMIN — SENNA PLUS 10 MILLILITER(S): 8.6 TABLET ORAL at 11:49

## 2020-01-01 RX ADMIN — FENTANYL CITRATE 50 MICROGRAM(S): 50 INJECTION INTRAVENOUS at 20:40

## 2020-01-01 RX ADMIN — Medication 4: at 23:52

## 2020-01-01 RX ADMIN — DEXMEDETOMIDINE HYDROCHLORIDE IN 0.9% SODIUM CHLORIDE 9.42 MICROGRAM(S)/KG/HR: 4 INJECTION INTRAVENOUS at 07:49

## 2020-01-01 RX ADMIN — AMIODARONE HYDROCHLORIDE 200 MILLIGRAM(S): 400 TABLET ORAL at 05:13

## 2020-01-01 RX ADMIN — Medication 1 PATCH: at 19:29

## 2020-01-01 RX ADMIN — CHLORHEXIDINE GLUCONATE 1 APPLICATION(S): 213 SOLUTION TOPICAL at 08:57

## 2020-01-01 RX ADMIN — CHLORHEXIDINE GLUCONATE 15 MILLILITER(S): 213 SOLUTION TOPICAL at 06:03

## 2020-01-01 RX ADMIN — AZITHROMYCIN 255 MILLIGRAM(S): 500 TABLET, FILM COATED ORAL at 15:48

## 2020-01-01 RX ADMIN — CEFEPIME 100 MILLIGRAM(S): 1 INJECTION, POWDER, FOR SOLUTION INTRAMUSCULAR; INTRAVENOUS at 00:08

## 2020-01-01 RX ADMIN — FENTANYL CITRATE 13.2 MICROGRAM(S)/KG/HR: 50 INJECTION INTRAVENOUS at 16:33

## 2020-01-01 RX ADMIN — Medication 81 MILLIGRAM(S): at 11:15

## 2020-01-01 RX ADMIN — PANTOPRAZOLE SODIUM 40 MILLIGRAM(S): 20 TABLET, DELAYED RELEASE ORAL at 10:12

## 2020-01-01 RX ADMIN — VECURONIUM BROMIDE 10 MILLIGRAM(S): 20 INJECTION, POWDER, FOR SOLUTION INTRAVENOUS at 02:29

## 2020-01-01 RX ADMIN — CHLORHEXIDINE GLUCONATE 1 APPLICATION(S): 213 SOLUTION TOPICAL at 13:06

## 2020-01-01 RX ADMIN — Medication 10 MILLIGRAM(S): at 20:06

## 2020-01-01 RX ADMIN — INSULIN HUMAN 10 UNIT(S): 100 INJECTION, SOLUTION SUBCUTANEOUS at 00:38

## 2020-01-01 RX ADMIN — Medication 81 MILLIGRAM(S): at 10:15

## 2020-01-01 RX ADMIN — Medication 100 GRAM(S): at 10:10

## 2020-01-01 RX ADMIN — Medication 14: at 11:52

## 2020-01-01 RX ADMIN — Medication 8: at 11:30

## 2020-01-01 RX ADMIN — CHLORHEXIDINE GLUCONATE 1 APPLICATION(S): 213 SOLUTION TOPICAL at 10:16

## 2020-01-01 RX ADMIN — INSULIN GLARGINE 60 UNIT(S): 100 INJECTION, SOLUTION SUBCUTANEOUS at 23:26

## 2020-01-01 RX ADMIN — CHLORHEXIDINE GLUCONATE 15 MILLILITER(S): 213 SOLUTION TOPICAL at 05:47

## 2020-01-01 RX ADMIN — Medication 8: at 05:30

## 2020-01-01 RX ADMIN — AMIODARONE HYDROCHLORIDE 8 MG/MIN: 400 TABLET ORAL at 09:13

## 2020-01-01 RX ADMIN — PROPOFOL 7.54 MICROGRAM(S)/KG/MIN: 10 INJECTION, EMULSION INTRAVENOUS at 22:22

## 2020-01-01 RX ADMIN — Medication 81 MILLIGRAM(S): at 13:44

## 2020-01-01 RX ADMIN — SODIUM CHLORIDE 42 MILLILITER(S): 9 INJECTION, SOLUTION INTRAVENOUS at 17:12

## 2020-01-01 RX ADMIN — LINEZOLID 300 MILLIGRAM(S): 600 INJECTION, SOLUTION INTRAVENOUS at 16:53

## 2020-01-01 RX ADMIN — CHLORHEXIDINE GLUCONATE 1 APPLICATION(S): 213 SOLUTION TOPICAL at 11:07

## 2020-01-01 RX ADMIN — FENTANYL CITRATE 50 MICROGRAM(S): 50 INJECTION INTRAVENOUS at 06:20

## 2020-01-01 RX ADMIN — ALBUTEROL 6 PUFF(S): 90 AEROSOL, METERED ORAL at 09:49

## 2020-01-01 RX ADMIN — Medication 650 MILLIGRAM(S): at 21:46

## 2020-01-01 RX ADMIN — HEPARIN SODIUM 2300 UNIT(S)/HR: 5000 INJECTION INTRAVENOUS; SUBCUTANEOUS at 07:20

## 2020-01-01 RX ADMIN — Medication 12: at 06:53

## 2020-01-01 RX ADMIN — Medication 12.3 MICROGRAM(S)/KG/MIN: at 03:15

## 2020-01-01 RX ADMIN — Medication 12: at 06:00

## 2020-01-01 RX ADMIN — CEFEPIME 100 MILLIGRAM(S): 1 INJECTION, POWDER, FOR SOLUTION INTRAMUSCULAR; INTRAVENOUS at 05:47

## 2020-01-01 RX ADMIN — HYDROMORPHONE HYDROCHLORIDE 0.5 MG/HR: 2 INJECTION INTRAMUSCULAR; INTRAVENOUS; SUBCUTANEOUS at 08:11

## 2020-01-01 RX ADMIN — Medication 4: at 21:42

## 2020-01-01 RX ADMIN — Medication 0.5 MILLIGRAM(S): at 05:45

## 2020-01-01 RX ADMIN — ALBUTEROL 6 PUFF(S): 90 AEROSOL, METERED ORAL at 20:55

## 2020-01-01 RX ADMIN — Medication 8: at 22:06

## 2020-01-01 RX ADMIN — PANTOPRAZOLE SODIUM 40 MILLIGRAM(S): 20 TABLET, DELAYED RELEASE ORAL at 10:16

## 2020-01-01 RX ADMIN — Medication 81 MILLIGRAM(S): at 11:09

## 2020-01-01 RX ADMIN — CHLORHEXIDINE GLUCONATE 15 MILLILITER(S): 213 SOLUTION TOPICAL at 16:33

## 2020-01-01 RX ADMIN — SENNA PLUS 10 MILLILITER(S): 8.6 TABLET ORAL at 11:26

## 2020-01-01 RX ADMIN — DEXMEDETOMIDINE HYDROCHLORIDE IN 0.9% SODIUM CHLORIDE 9.42 MICROGRAM(S)/KG/HR: 4 INJECTION INTRAVENOUS at 21:32

## 2020-01-01 RX ADMIN — HYDROMORPHONE HYDROCHLORIDE 1 MILLIGRAM(S): 2 INJECTION INTRAMUSCULAR; INTRAVENOUS; SUBCUTANEOUS at 20:56

## 2020-01-01 RX ADMIN — HEPARIN SODIUM 5000 UNIT(S): 5000 INJECTION INTRAVENOUS; SUBCUTANEOUS at 06:41

## 2020-01-01 RX ADMIN — CHLORHEXIDINE GLUCONATE 1 APPLICATION(S): 213 SOLUTION TOPICAL at 10:04

## 2020-01-01 RX ADMIN — PANTOPRAZOLE SODIUM 40 MILLIGRAM(S): 20 TABLET, DELAYED RELEASE ORAL at 13:04

## 2020-01-01 RX ADMIN — HEPARIN SODIUM 7500 UNIT(S): 5000 INJECTION INTRAVENOUS; SUBCUTANEOUS at 04:38

## 2020-01-01 RX ADMIN — LINEZOLID 300 MILLIGRAM(S): 600 INJECTION, SOLUTION INTRAVENOUS at 05:14

## 2020-01-01 RX ADMIN — Medication 81 MILLIGRAM(S): at 10:04

## 2020-01-01 RX ADMIN — Medication 4: at 09:54

## 2020-01-01 RX ADMIN — ALBUTEROL 6 PUFF(S): 90 AEROSOL, METERED ORAL at 16:57

## 2020-01-01 RX ADMIN — DEXMEDETOMIDINE HYDROCHLORIDE IN 0.9% SODIUM CHLORIDE 9.42 MICROGRAM(S)/KG/HR: 4 INJECTION INTRAVENOUS at 06:16

## 2020-01-01 RX ADMIN — Medication 4: at 06:04

## 2020-01-01 RX ADMIN — CHLORHEXIDINE GLUCONATE 15 MILLILITER(S): 213 SOLUTION TOPICAL at 17:44

## 2020-01-01 RX ADMIN — CHLORHEXIDINE GLUCONATE 15 MILLILITER(S): 213 SOLUTION TOPICAL at 17:32

## 2020-01-01 RX ADMIN — HEPARIN SODIUM 7500 UNIT(S): 5000 INJECTION INTRAVENOUS; SUBCUTANEOUS at 21:52

## 2020-01-01 RX ADMIN — MEROPENEM 100 MILLIGRAM(S): 1 INJECTION INTRAVENOUS at 05:06

## 2020-01-01 RX ADMIN — ALBUTEROL 6 PUFF(S): 90 AEROSOL, METERED ORAL at 16:30

## 2020-01-01 RX ADMIN — Medication 5 MILLIGRAM(S): at 08:43

## 2020-01-01 RX ADMIN — Medication 0: at 18:48

## 2020-01-01 RX ADMIN — Medication 0.5 MILLIGRAM(S): at 05:29

## 2020-01-01 RX ADMIN — Medication 650 MILLIGRAM(S): at 17:54

## 2020-01-01 RX ADMIN — CEFEPIME 100 MILLIGRAM(S): 1 INJECTION, POWDER, FOR SOLUTION INTRAMUSCULAR; INTRAVENOUS at 18:45

## 2020-01-01 RX ADMIN — ALBUTEROL 6 PUFF(S): 90 AEROSOL, METERED ORAL at 15:04

## 2020-01-01 RX ADMIN — PIPERACILLIN AND TAZOBACTAM 25 GRAM(S): 4; .5 INJECTION, POWDER, LYOPHILIZED, FOR SOLUTION INTRAVENOUS at 22:43

## 2020-01-01 RX ADMIN — PANTOPRAZOLE SODIUM 40 MILLIGRAM(S): 20 TABLET, DELAYED RELEASE ORAL at 13:07

## 2020-01-01 RX ADMIN — INSULIN GLARGINE 40 UNIT(S): 100 INJECTION, SOLUTION SUBCUTANEOUS at 08:30

## 2020-01-01 RX ADMIN — FENTANYL CITRATE 13.2 MICROGRAM(S)/KG/HR: 50 INJECTION INTRAVENOUS at 04:57

## 2020-01-01 RX ADMIN — ALBUTEROL 6 PUFF(S): 90 AEROSOL, METERED ORAL at 15:14

## 2020-01-01 RX ADMIN — HEPARIN SODIUM 7500 UNIT(S): 5000 INJECTION INTRAVENOUS; SUBCUTANEOUS at 14:55

## 2020-01-01 RX ADMIN — CEFEPIME 100 MILLIGRAM(S): 1 INJECTION, POWDER, FOR SOLUTION INTRAMUSCULAR; INTRAVENOUS at 06:39

## 2020-01-01 RX ADMIN — CHLORHEXIDINE GLUCONATE 15 MILLILITER(S): 213 SOLUTION TOPICAL at 05:44

## 2020-01-01 RX ADMIN — Medication 81 MILLIGRAM(S): at 11:54

## 2020-01-01 RX ADMIN — LINEZOLID 300 MILLIGRAM(S): 600 INJECTION, SOLUTION INTRAVENOUS at 17:36

## 2020-01-01 RX ADMIN — ALBUTEROL 6 PUFF(S): 90 AEROSOL, METERED ORAL at 10:30

## 2020-01-01 RX ADMIN — HYDROMORPHONE HYDROCHLORIDE 1 MG/HR: 2 INJECTION INTRAMUSCULAR; INTRAVENOUS; SUBCUTANEOUS at 13:06

## 2020-01-01 RX ADMIN — INSULIN GLARGINE 40 UNIT(S): 100 INJECTION, SOLUTION SUBCUTANEOUS at 06:30

## 2020-01-01 RX ADMIN — PROPOFOL 7.54 MICROGRAM(S)/KG/MIN: 10 INJECTION, EMULSION INTRAVENOUS at 22:10

## 2020-01-01 RX ADMIN — Medication 1 PATCH: at 11:54

## 2020-01-01 RX ADMIN — SODIUM CHLORIDE 100 MILLILITER(S): 9 INJECTION INTRAMUSCULAR; INTRAVENOUS; SUBCUTANEOUS at 00:49

## 2020-01-01 RX ADMIN — HEPARIN SODIUM 5000 UNIT(S): 5000 INJECTION INTRAVENOUS; SUBCUTANEOUS at 07:04

## 2020-01-01 RX ADMIN — PANTOPRAZOLE SODIUM 40 MILLIGRAM(S): 20 TABLET, DELAYED RELEASE ORAL at 13:58

## 2020-01-01 RX ADMIN — CEFEPIME 100 MILLIGRAM(S): 1 INJECTION, POWDER, FOR SOLUTION INTRAMUSCULAR; INTRAVENOUS at 21:16

## 2020-01-01 RX ADMIN — FENTANYL CITRATE 50 MICROGRAM(S): 50 INJECTION INTRAVENOUS at 01:00

## 2020-01-01 RX ADMIN — Medication 0.5 MILLIGRAM(S): at 05:02

## 2020-01-01 RX ADMIN — DEXMEDETOMIDINE HYDROCHLORIDE IN 0.9% SODIUM CHLORIDE 9.42 MICROGRAM(S)/KG/HR: 4 INJECTION INTRAVENOUS at 14:14

## 2020-01-01 RX ADMIN — DEXMEDETOMIDINE HYDROCHLORIDE IN 0.9% SODIUM CHLORIDE 9.42 MICROGRAM(S)/KG/HR: 4 INJECTION INTRAVENOUS at 00:12

## 2020-01-01 RX ADMIN — AMIODARONE HYDROCHLORIDE 200 MILLIGRAM(S): 400 TABLET ORAL at 04:04

## 2020-01-01 RX ADMIN — ALBUTEROL 6 PUFF(S): 90 AEROSOL, METERED ORAL at 20:42

## 2020-01-01 RX ADMIN — Medication 200 MILLIGRAM(S): at 03:32

## 2020-01-01 RX ADMIN — Medication 2 MILLIGRAM(S): at 14:54

## 2020-01-01 RX ADMIN — DEXMEDETOMIDINE HYDROCHLORIDE IN 0.9% SODIUM CHLORIDE 9.42 MICROGRAM(S)/KG/HR: 4 INJECTION INTRAVENOUS at 08:29

## 2020-01-01 RX ADMIN — CHLORHEXIDINE GLUCONATE 15 MILLILITER(S): 213 SOLUTION TOPICAL at 22:41

## 2020-01-01 RX ADMIN — INSULIN GLARGINE 50 UNIT(S): 100 INJECTION, SOLUTION SUBCUTANEOUS at 22:26

## 2020-01-01 RX ADMIN — Medication 8: at 11:11

## 2020-01-01 RX ADMIN — Medication 8: at 11:40

## 2020-01-01 RX ADMIN — Medication 200 MILLIGRAM(S): at 12:25

## 2020-01-01 RX ADMIN — Medication 4: at 13:57

## 2020-01-01 RX ADMIN — CHLORHEXIDINE GLUCONATE 1 APPLICATION(S): 213 SOLUTION TOPICAL at 05:23

## 2020-01-01 RX ADMIN — Medication 4: at 02:00

## 2020-01-01 RX ADMIN — PANTOPRAZOLE SODIUM 40 MILLIGRAM(S): 20 TABLET, DELAYED RELEASE ORAL at 12:33

## 2020-01-01 RX ADMIN — Medication 8: at 10:24

## 2020-01-01 RX ADMIN — Medication 10 MILLIGRAM(S): at 15:07

## 2020-01-01 RX ADMIN — PIPERACILLIN AND TAZOBACTAM 25 GRAM(S): 4; .5 INJECTION, POWDER, LYOPHILIZED, FOR SOLUTION INTRAVENOUS at 05:49

## 2020-01-01 RX ADMIN — FENTANYL CITRATE 13.2 MICROGRAM(S)/KG/HR: 50 INJECTION INTRAVENOUS at 06:06

## 2020-01-01 RX ADMIN — SODIUM CHLORIDE 500 MILLILITER(S): 9 INJECTION, SOLUTION INTRAVENOUS at 11:30

## 2020-01-01 RX ADMIN — PROPOFOL 7.54 MICROGRAM(S)/KG/MIN: 10 INJECTION, EMULSION INTRAVENOUS at 10:00

## 2020-01-01 RX ADMIN — Medication 4: at 23:32

## 2020-01-01 RX ADMIN — ALBUTEROL 6 PUFF(S): 90 AEROSOL, METERED ORAL at 16:40

## 2020-01-01 RX ADMIN — Medication 650 MILLIGRAM(S): at 00:10

## 2020-01-01 RX ADMIN — FENTANYL CITRATE 50 MICROGRAM(S): 50 INJECTION INTRAVENOUS at 11:17

## 2020-01-01 RX ADMIN — Medication 4: at 21:54

## 2020-01-01 RX ADMIN — PROPOFOL 7.54 MICROGRAM(S)/KG/MIN: 10 INJECTION, EMULSION INTRAVENOUS at 08:10

## 2020-01-01 RX ADMIN — HYDROMORPHONE HYDROCHLORIDE 1 MILLIGRAM(S): 2 INJECTION INTRAMUSCULAR; INTRAVENOUS; SUBCUTANEOUS at 12:12

## 2020-01-01 RX ADMIN — PANTOPRAZOLE SODIUM 40 MILLIGRAM(S): 20 TABLET, DELAYED RELEASE ORAL at 13:11

## 2020-01-01 RX ADMIN — DEXMEDETOMIDINE HYDROCHLORIDE IN 0.9% SODIUM CHLORIDE 9.42 MICROGRAM(S)/KG/HR: 4 INJECTION INTRAVENOUS at 10:26

## 2020-01-01 RX ADMIN — HYDROMORPHONE HYDROCHLORIDE 3 MG/HR: 2 INJECTION INTRAMUSCULAR; INTRAVENOUS; SUBCUTANEOUS at 14:51

## 2020-01-01 RX ADMIN — DEXMEDETOMIDINE HYDROCHLORIDE IN 0.9% SODIUM CHLORIDE 9.42 MICROGRAM(S)/KG/HR: 4 INJECTION INTRAVENOUS at 02:07

## 2020-01-01 RX ADMIN — Medication 11.8 MICROGRAM(S)/KG/MIN: at 07:09

## 2020-01-01 RX ADMIN — Medication 4: at 06:49

## 2020-01-01 RX ADMIN — INSULIN GLARGINE 40 UNIT(S): 100 INJECTION, SOLUTION SUBCUTANEOUS at 21:41

## 2020-01-01 RX ADMIN — Medication 400 MILLIGRAM(S): at 02:50

## 2020-01-01 RX ADMIN — ALBUTEROL 6 PUFF(S): 90 AEROSOL, METERED ORAL at 01:39

## 2020-01-01 RX ADMIN — INSULIN GLARGINE 40 UNIT(S): 100 INJECTION, SOLUTION SUBCUTANEOUS at 23:52

## 2020-01-01 RX ADMIN — HEPARIN SODIUM 7500 UNIT(S): 5000 INJECTION INTRAVENOUS; SUBCUTANEOUS at 11:12

## 2020-01-01 RX ADMIN — Medication 300 MILLIGRAM(S): at 04:05

## 2020-01-01 RX ADMIN — ALBUTEROL 6 PUFF(S): 90 AEROSOL, METERED ORAL at 08:00

## 2020-01-01 RX ADMIN — Medication 650 MILLIGRAM(S): at 08:29

## 2020-01-01 RX ADMIN — CHLORHEXIDINE GLUCONATE 15 MILLILITER(S): 213 SOLUTION TOPICAL at 17:24

## 2020-01-01 RX ADMIN — Medication 10: at 17:16

## 2020-01-01 RX ADMIN — Medication 11.8 MICROGRAM(S)/KG/MIN: at 05:30

## 2020-01-01 RX ADMIN — Medication 14: at 11:08

## 2020-01-01 RX ADMIN — PROPOFOL 7.54 MICROGRAM(S)/KG/MIN: 10 INJECTION, EMULSION INTRAVENOUS at 14:37

## 2020-01-01 RX ADMIN — Medication 14: at 07:34

## 2020-01-01 RX ADMIN — Medication 1 PATCH: at 08:54

## 2020-01-01 RX ADMIN — HEPARIN SODIUM 7500 UNIT(S): 5000 INJECTION INTRAVENOUS; SUBCUTANEOUS at 04:57

## 2020-01-01 RX ADMIN — PROPOFOL 7.54 MICROGRAM(S)/KG/MIN: 10 INJECTION, EMULSION INTRAVENOUS at 05:45

## 2020-01-01 RX ADMIN — INSULIN GLARGINE 60 UNIT(S): 100 INJECTION, SOLUTION SUBCUTANEOUS at 23:09

## 2020-01-01 RX ADMIN — Medication 50 MILLIEQUIVALENT(S): at 11:53

## 2020-01-01 RX ADMIN — DEXMEDETOMIDINE HYDROCHLORIDE IN 0.9% SODIUM CHLORIDE 9.42 MICROGRAM(S)/KG/HR: 4 INJECTION INTRAVENOUS at 08:04

## 2020-01-01 RX ADMIN — AMLODIPINE BESYLATE 5 MILLIGRAM(S): 2.5 TABLET ORAL at 05:06

## 2020-01-01 RX ADMIN — PIPERACILLIN AND TAZOBACTAM 25 GRAM(S): 4; .5 INJECTION, POWDER, LYOPHILIZED, FOR SOLUTION INTRAVENOUS at 06:46

## 2020-01-01 RX ADMIN — FENTANYL CITRATE 100 MICROGRAM(S): 50 INJECTION INTRAVENOUS at 05:24

## 2020-01-01 RX ADMIN — PROPOFOL 7.54 MICROGRAM(S)/KG/MIN: 10 INJECTION, EMULSION INTRAVENOUS at 11:40

## 2020-01-01 RX ADMIN — Medication 1 PATCH: at 08:17

## 2020-01-01 RX ADMIN — FLUCONAZOLE 100 MILLIGRAM(S): 150 TABLET ORAL at 13:53

## 2020-01-01 RX ADMIN — SENNA PLUS 10 MILLILITER(S): 8.6 TABLET ORAL at 10:04

## 2020-01-01 RX ADMIN — CEFEPIME 100 MILLIGRAM(S): 1 INJECTION, POWDER, FOR SOLUTION INTRAMUSCULAR; INTRAVENOUS at 22:05

## 2020-01-01 RX ADMIN — CHLORHEXIDINE GLUCONATE 1 APPLICATION(S): 213 SOLUTION TOPICAL at 11:56

## 2020-01-01 RX ADMIN — Medication 25 MILLILITER(S): at 18:00

## 2020-01-01 RX ADMIN — OXYCODONE HYDROCHLORIDE 5 MILLIGRAM(S): 5 TABLET ORAL at 06:02

## 2020-01-01 RX ADMIN — INSULIN HUMAN 8 UNIT(S)/HR: 100 INJECTION, SOLUTION SUBCUTANEOUS at 22:09

## 2020-01-01 RX ADMIN — PROPOFOL 7.54 MICROGRAM(S)/KG/MIN: 10 INJECTION, EMULSION INTRAVENOUS at 01:00

## 2020-01-01 RX ADMIN — HYDROMORPHONE HYDROCHLORIDE 1 MILLIGRAM(S): 2 INJECTION INTRAMUSCULAR; INTRAVENOUS; SUBCUTANEOUS at 05:48

## 2020-01-01 RX ADMIN — FLUCONAZOLE 100 MILLIGRAM(S): 150 TABLET ORAL at 13:45

## 2020-01-01 RX ADMIN — INSULIN GLARGINE 40 UNIT(S): 100 INJECTION, SOLUTION SUBCUTANEOUS at 06:22

## 2020-01-01 RX ADMIN — Medication 40 MILLIEQUIVALENT(S): at 07:25

## 2020-01-01 RX ADMIN — LINEZOLID 300 MILLIGRAM(S): 600 INJECTION, SOLUTION INTRAVENOUS at 16:26

## 2020-01-01 RX ADMIN — ALBUTEROL 6 PUFF(S): 90 AEROSOL, METERED ORAL at 03:08

## 2020-01-01 RX ADMIN — CEFEPIME 100 MILLIGRAM(S): 1 INJECTION, POWDER, FOR SOLUTION INTRAMUSCULAR; INTRAVENOUS at 22:04

## 2020-01-01 RX ADMIN — CEFEPIME 100 MILLIGRAM(S): 1 INJECTION, POWDER, FOR SOLUTION INTRAMUSCULAR; INTRAVENOUS at 14:42

## 2020-01-01 RX ADMIN — FLUCONAZOLE 100 MILLIGRAM(S): 150 TABLET ORAL at 14:16

## 2020-01-01 RX ADMIN — OXYCODONE HYDROCHLORIDE 5 MILLIGRAM(S): 5 TABLET ORAL at 17:14

## 2020-01-01 RX ADMIN — ALBUTEROL 6 PUFF(S): 90 AEROSOL, METERED ORAL at 08:10

## 2020-01-01 RX ADMIN — CHLORHEXIDINE GLUCONATE 1 APPLICATION(S): 213 SOLUTION TOPICAL at 12:23

## 2020-01-01 RX ADMIN — Medication 4: at 22:00

## 2020-01-01 RX ADMIN — Medication 8: at 17:38

## 2020-01-01 RX ADMIN — PROPOFOL 3.95 MICROGRAM(S)/KG/MIN: 10 INJECTION, EMULSION INTRAVENOUS at 00:45

## 2020-01-01 RX ADMIN — DEXMEDETOMIDINE HYDROCHLORIDE IN 0.9% SODIUM CHLORIDE 9.42 MICROGRAM(S)/KG/HR: 4 INJECTION INTRAVENOUS at 22:02

## 2020-01-01 RX ADMIN — Medication 8: at 06:31

## 2020-01-01 RX ADMIN — FENTANYL CITRATE 50 MICROGRAM(S): 50 INJECTION INTRAVENOUS at 09:14

## 2020-01-01 RX ADMIN — Medication 4: at 12:48

## 2020-01-01 RX ADMIN — AMIODARONE HYDROCHLORIDE 200 MILLIGRAM(S): 400 TABLET ORAL at 06:45

## 2020-01-01 RX ADMIN — Medication 200 MILLIGRAM(S): at 11:15

## 2020-01-01 RX ADMIN — LINEZOLID 300 MILLIGRAM(S): 600 INJECTION, SOLUTION INTRAVENOUS at 05:39

## 2020-01-01 RX ADMIN — CEFEPIME 100 MILLIGRAM(S): 1 INJECTION, POWDER, FOR SOLUTION INTRAMUSCULAR; INTRAVENOUS at 23:16

## 2020-01-01 RX ADMIN — Medication 4: at 06:06

## 2020-01-01 RX ADMIN — Medication 650 MILLIGRAM(S): at 08:52

## 2020-01-01 RX ADMIN — CHLORHEXIDINE GLUCONATE 15 MILLILITER(S): 213 SOLUTION TOPICAL at 17:18

## 2020-01-01 RX ADMIN — AMIODARONE HYDROCHLORIDE 618 MILLIGRAM(S): 400 TABLET ORAL at 12:27

## 2020-01-01 RX ADMIN — AMLODIPINE BESYLATE 5 MILLIGRAM(S): 2.5 TABLET ORAL at 10:13

## 2020-01-01 RX ADMIN — HEPARIN SODIUM 2100 UNIT(S)/HR: 5000 INJECTION INTRAVENOUS; SUBCUTANEOUS at 00:36

## 2020-01-01 RX ADMIN — Medication 4: at 05:00

## 2020-01-01 RX ADMIN — HEPARIN SODIUM 7500 UNIT(S): 5000 INJECTION INTRAVENOUS; SUBCUTANEOUS at 22:01

## 2020-01-01 RX ADMIN — INSULIN GLARGINE 40 UNIT(S): 100 INJECTION, SOLUTION SUBCUTANEOUS at 09:55

## 2020-01-01 RX ADMIN — CEFEPIME 100 MILLIGRAM(S): 1 INJECTION, POWDER, FOR SOLUTION INTRAMUSCULAR; INTRAVENOUS at 11:58

## 2020-01-01 RX ADMIN — FLUCONAZOLE 100 MILLIGRAM(S): 150 TABLET ORAL at 14:56

## 2020-01-01 RX ADMIN — FENTANYL CITRATE 50 MICROGRAM(S): 50 INJECTION INTRAVENOUS at 07:45

## 2020-01-01 RX ADMIN — Medication 1 PATCH: at 20:16

## 2020-01-01 RX ADMIN — DEXMEDETOMIDINE HYDROCHLORIDE IN 0.9% SODIUM CHLORIDE 9.42 MICROGRAM(S)/KG/HR: 4 INJECTION INTRAVENOUS at 05:25

## 2020-01-01 RX ADMIN — ALBUTEROL 6 PUFF(S): 90 AEROSOL, METERED ORAL at 09:29

## 2020-01-01 RX ADMIN — CHLORHEXIDINE GLUCONATE 15 MILLILITER(S): 213 SOLUTION TOPICAL at 17:19

## 2020-01-01 RX ADMIN — Medication 4: at 17:32

## 2020-01-01 RX ADMIN — CHLORHEXIDINE GLUCONATE 15 MILLILITER(S): 213 SOLUTION TOPICAL at 15:31

## 2020-01-01 RX ADMIN — Medication 10 MILLIGRAM(S): at 01:42

## 2020-01-01 RX ADMIN — ALBUTEROL 6 PUFF(S): 90 AEROSOL, METERED ORAL at 02:57

## 2020-01-01 RX ADMIN — SENNA PLUS 10 MILLILITER(S): 8.6 TABLET ORAL at 11:45

## 2020-01-01 RX ADMIN — Medication 85 MILLIMOLE(S): at 06:33

## 2020-01-01 RX ADMIN — POTASSIUM PHOSPHATE, MONOBASIC POTASSIUM PHOSPHATE, DIBASIC 62.5 MILLIMOLE(S): 236; 224 INJECTION, SOLUTION INTRAVENOUS at 07:25

## 2020-01-01 RX ADMIN — CHLORHEXIDINE GLUCONATE 15 MILLILITER(S): 213 SOLUTION TOPICAL at 05:54

## 2020-01-01 RX ADMIN — HYDROMORPHONE HYDROCHLORIDE 1 MILLIGRAM(S): 2 INJECTION INTRAMUSCULAR; INTRAVENOUS; SUBCUTANEOUS at 16:13

## 2020-01-01 RX ADMIN — DEXMEDETOMIDINE HYDROCHLORIDE IN 0.9% SODIUM CHLORIDE 9.42 MICROGRAM(S)/KG/HR: 4 INJECTION INTRAVENOUS at 10:24

## 2020-01-01 RX ADMIN — ALBUTEROL 6 PUFF(S): 90 AEROSOL, METERED ORAL at 03:04

## 2020-01-01 RX ADMIN — Medication 8: at 05:54

## 2020-01-01 RX ADMIN — CHLORHEXIDINE GLUCONATE 15 MILLILITER(S): 213 SOLUTION TOPICAL at 15:21

## 2020-01-01 RX ADMIN — PROPOFOL 7.54 MICROGRAM(S)/KG/MIN: 10 INJECTION, EMULSION INTRAVENOUS at 23:54

## 2020-01-01 RX ADMIN — ALBUTEROL 6 PUFF(S): 90 AEROSOL, METERED ORAL at 23:18

## 2020-01-01 RX ADMIN — Medication 50 MILLIEQUIVALENT(S): at 09:44

## 2020-01-01 RX ADMIN — Medication 50 MILLIGRAM(S): at 04:01

## 2020-01-01 RX ADMIN — FLUCONAZOLE 100 MILLIGRAM(S): 150 TABLET ORAL at 20:25

## 2020-01-01 RX ADMIN — CEFEPIME 100 MILLIGRAM(S): 1 INJECTION, POWDER, FOR SOLUTION INTRAMUSCULAR; INTRAVENOUS at 12:15

## 2020-01-01 RX ADMIN — INSULIN GLARGINE 50 UNIT(S): 100 INJECTION, SOLUTION SUBCUTANEOUS at 22:29

## 2020-01-01 RX ADMIN — DEXMEDETOMIDINE HYDROCHLORIDE IN 0.9% SODIUM CHLORIDE 9.42 MICROGRAM(S)/KG/HR: 4 INJECTION INTRAVENOUS at 20:10

## 2020-01-01 RX ADMIN — ALBUTEROL 6 PUFF(S): 90 AEROSOL, METERED ORAL at 17:14

## 2020-01-01 RX ADMIN — DEXMEDETOMIDINE HYDROCHLORIDE IN 0.9% SODIUM CHLORIDE 9.42 MICROGRAM(S)/KG/HR: 4 INJECTION INTRAVENOUS at 15:59

## 2020-01-01 RX ADMIN — SODIUM CHLORIDE 50 MILLILITER(S): 9 INJECTION, SOLUTION INTRAVENOUS at 15:49

## 2020-01-01 RX ADMIN — ALBUTEROL 6 PUFF(S): 90 AEROSOL, METERED ORAL at 08:34

## 2020-01-01 RX ADMIN — PANTOPRAZOLE SODIUM 40 MILLIGRAM(S): 20 TABLET, DELAYED RELEASE ORAL at 11:24

## 2020-01-01 RX ADMIN — Medication 8: at 23:13

## 2020-01-01 RX ADMIN — AMIODARONE HYDROCHLORIDE 200 MILLIGRAM(S): 400 TABLET ORAL at 06:51

## 2020-01-01 RX ADMIN — DEXMEDETOMIDINE HYDROCHLORIDE IN 0.9% SODIUM CHLORIDE 6.28 MICROGRAM(S)/KG/HR: 4 INJECTION INTRAVENOUS at 21:20

## 2020-01-01 RX ADMIN — Medication 4: at 17:05

## 2020-01-01 RX ADMIN — FENTANYL CITRATE 50 MICROGRAM(S): 50 INJECTION INTRAVENOUS at 16:47

## 2020-01-01 RX ADMIN — Medication 62.5 MILLIMOLE(S): at 07:44

## 2020-01-01 RX ADMIN — Medication 250 MILLIGRAM(S): at 06:38

## 2020-01-01 RX ADMIN — Medication 12: at 16:53

## 2020-01-01 RX ADMIN — AMIODARONE HYDROCHLORIDE 200 MILLIGRAM(S): 400 TABLET ORAL at 06:46

## 2020-01-01 RX ADMIN — HEPARIN SODIUM 2100 UNIT(S)/HR: 5000 INJECTION INTRAVENOUS; SUBCUTANEOUS at 05:19

## 2020-01-01 RX ADMIN — INSULIN GLARGINE 40 UNIT(S): 100 INJECTION, SOLUTION SUBCUTANEOUS at 05:30

## 2020-01-01 RX ADMIN — Medication 200 MILLIGRAM(S): at 10:40

## 2020-01-01 RX ADMIN — INSULIN GLARGINE 40 UNIT(S): 100 INJECTION, SOLUTION SUBCUTANEOUS at 07:49

## 2020-01-01 RX ADMIN — Medication 1 PATCH: at 21:27

## 2020-01-01 RX ADMIN — SODIUM CHLORIDE 2000 MILLILITER(S): 9 INJECTION, SOLUTION INTRAVENOUS at 23:28

## 2020-01-01 RX ADMIN — ALBUTEROL 6 PUFF(S): 90 AEROSOL, METERED ORAL at 09:32

## 2020-01-01 RX ADMIN — CHLORHEXIDINE GLUCONATE 15 MILLILITER(S): 213 SOLUTION TOPICAL at 06:12

## 2020-01-01 RX ADMIN — HEPARIN SODIUM 7500 UNIT(S): 5000 INJECTION INTRAVENOUS; SUBCUTANEOUS at 12:21

## 2020-01-01 RX ADMIN — HEPARIN SODIUM 2300 UNIT(S)/HR: 5000 INJECTION INTRAVENOUS; SUBCUTANEOUS at 23:12

## 2020-01-01 RX ADMIN — CHLORHEXIDINE GLUCONATE 15 MILLILITER(S): 213 SOLUTION TOPICAL at 04:54

## 2020-01-01 RX ADMIN — Medication 8: at 16:45

## 2020-01-01 RX ADMIN — INSULIN GLARGINE 40 UNIT(S): 100 INJECTION, SOLUTION SUBCUTANEOUS at 23:13

## 2020-01-01 RX ADMIN — CHLORHEXIDINE GLUCONATE 1 APPLICATION(S): 213 SOLUTION TOPICAL at 10:39

## 2020-01-01 RX ADMIN — INSULIN GLARGINE 60 UNIT(S): 100 INJECTION, SOLUTION SUBCUTANEOUS at 09:04

## 2020-01-01 RX ADMIN — Medication 8: at 11:39

## 2020-01-01 RX ADMIN — Medication 0.5 MILLIGRAM(S): at 05:32

## 2020-01-01 RX ADMIN — HEPARIN SODIUM 7500 UNIT(S): 5000 INJECTION INTRAVENOUS; SUBCUTANEOUS at 12:56

## 2020-01-01 RX ADMIN — INSULIN GLARGINE 50 UNIT(S): 100 INJECTION, SOLUTION SUBCUTANEOUS at 21:05

## 2020-01-01 RX ADMIN — CHLORHEXIDINE GLUCONATE 1 APPLICATION(S): 213 SOLUTION TOPICAL at 12:19

## 2020-01-01 RX ADMIN — LINEZOLID 300 MILLIGRAM(S): 600 INJECTION, SOLUTION INTRAVENOUS at 17:44

## 2020-01-01 RX ADMIN — DEXMEDETOMIDINE HYDROCHLORIDE IN 0.9% SODIUM CHLORIDE 9.42 MICROGRAM(S)/KG/HR: 4 INJECTION INTRAVENOUS at 22:40

## 2020-01-01 RX ADMIN — Medication 4: at 11:09

## 2020-01-01 RX ADMIN — SENNA PLUS 10 MILLILITER(S): 8.6 TABLET ORAL at 12:35

## 2020-01-01 RX ADMIN — LINEZOLID 300 MILLIGRAM(S): 600 INJECTION, SOLUTION INTRAVENOUS at 16:49

## 2020-01-01 RX ADMIN — Medication 8: at 01:42

## 2020-01-01 RX ADMIN — MIDAZOLAM HYDROCHLORIDE 4 MILLIGRAM(S): 1 INJECTION, SOLUTION INTRAMUSCULAR; INTRAVENOUS at 16:05

## 2020-01-01 RX ADMIN — Medication 8: at 06:18

## 2020-01-01 RX ADMIN — Medication 10 MILLIGRAM(S): at 23:30

## 2020-01-01 RX ADMIN — AMIODARONE HYDROCHLORIDE 200 MILLIGRAM(S): 400 TABLET ORAL at 16:58

## 2020-01-01 RX ADMIN — Medication 8: at 17:23

## 2020-01-01 RX ADMIN — FLUCONAZOLE 100 MILLIGRAM(S): 150 TABLET ORAL at 04:53

## 2020-01-01 RX ADMIN — CHLORHEXIDINE GLUCONATE 1 APPLICATION(S): 213 SOLUTION TOPICAL at 13:10

## 2020-01-01 RX ADMIN — Medication 400 MILLIGRAM(S): at 05:24

## 2020-01-01 RX ADMIN — ALBUTEROL 6 PUFF(S): 90 AEROSOL, METERED ORAL at 15:40

## 2020-01-01 RX ADMIN — FLUCONAZOLE 100 MILLIGRAM(S): 150 TABLET ORAL at 17:23

## 2020-01-01 RX ADMIN — CEFEPIME 100 MILLIGRAM(S): 1 INJECTION, POWDER, FOR SOLUTION INTRAMUSCULAR; INTRAVENOUS at 04:53

## 2020-01-01 RX ADMIN — HEPARIN SODIUM 5000 UNIT(S): 5000 INJECTION INTRAVENOUS; SUBCUTANEOUS at 09:14

## 2020-01-01 RX ADMIN — Medication 0.5 MILLIGRAM(S): at 17:58

## 2020-01-01 RX ADMIN — Medication 5 MILLIGRAM(S): at 23:21

## 2020-01-01 RX ADMIN — Medication 8: at 11:26

## 2020-01-01 RX ADMIN — Medication 1 PATCH: at 07:30

## 2020-01-01 RX ADMIN — Medication 10 MILLIGRAM(S): at 20:14

## 2020-01-01 RX ADMIN — HEPARIN SODIUM 1800 UNIT(S)/HR: 5000 INJECTION INTRAVENOUS; SUBCUTANEOUS at 18:18

## 2020-01-01 RX ADMIN — CHLORHEXIDINE GLUCONATE 15 MILLILITER(S): 213 SOLUTION TOPICAL at 04:15

## 2020-01-01 RX ADMIN — ALBUTEROL 6 PUFF(S): 90 AEROSOL, METERED ORAL at 03:48

## 2020-01-01 RX ADMIN — AMLODIPINE BESYLATE 5 MILLIGRAM(S): 2.5 TABLET ORAL at 05:13

## 2020-01-01 RX ADMIN — SODIUM ZIRCONIUM CYCLOSILICATE 10 GRAM(S): 10 POWDER, FOR SUSPENSION ORAL at 21:57

## 2020-01-01 RX ADMIN — HYDROMORPHONE HYDROCHLORIDE 1 MILLIGRAM(S): 2 INJECTION INTRAMUSCULAR; INTRAVENOUS; SUBCUTANEOUS at 00:48

## 2020-01-01 RX ADMIN — SODIUM ZIRCONIUM CYCLOSILICATE 10 GRAM(S): 10 POWDER, FOR SUSPENSION ORAL at 05:25

## 2020-01-01 RX ADMIN — LINEZOLID 300 MILLIGRAM(S): 600 INJECTION, SOLUTION INTRAVENOUS at 18:02

## 2020-01-01 RX ADMIN — CHLORHEXIDINE GLUCONATE 1 APPLICATION(S): 213 SOLUTION TOPICAL at 08:36

## 2020-01-01 RX ADMIN — HEPARIN SODIUM 7500 UNIT(S): 5000 INJECTION INTRAVENOUS; SUBCUTANEOUS at 22:42

## 2020-01-01 RX ADMIN — CHLORHEXIDINE GLUCONATE 15 MILLILITER(S): 213 SOLUTION TOPICAL at 18:39

## 2020-01-01 RX ADMIN — INSULIN GLARGINE 50 UNIT(S): 100 INJECTION, SOLUTION SUBCUTANEOUS at 09:06

## 2020-01-01 RX ADMIN — DEXMEDETOMIDINE HYDROCHLORIDE IN 0.9% SODIUM CHLORIDE 9.42 MICROGRAM(S)/KG/HR: 4 INJECTION INTRAVENOUS at 05:38

## 2020-01-01 RX ADMIN — HEPARIN SODIUM 1800 UNIT(S)/HR: 5000 INJECTION INTRAVENOUS; SUBCUTANEOUS at 13:42

## 2020-01-01 RX ADMIN — HEPARIN SODIUM 7500 UNIT(S): 5000 INJECTION INTRAVENOUS; SUBCUTANEOUS at 14:10

## 2020-01-01 RX ADMIN — AMIODARONE HYDROCHLORIDE 200 MILLIGRAM(S): 400 TABLET ORAL at 04:15

## 2020-01-01 RX ADMIN — HEPARIN SODIUM 7500 UNIT(S): 5000 INJECTION INTRAVENOUS; SUBCUTANEOUS at 04:59

## 2020-01-01 RX ADMIN — Medication 50 MILLIGRAM(S): at 11:20

## 2020-01-01 RX ADMIN — Medication 0.5 MILLIGRAM(S): at 04:15

## 2020-04-02 NOTE — H&P ADULT - NSHPSOCIALHISTORY_GEN_ALL_CORE
Patient lives alone at home.  Former smoker, quit >10 years ago.  No alcohol or illicit drug use. Patient lives alone at home.  Former smoker, quit >40 years ago.  No alcohol or illicit drug use.

## 2020-04-02 NOTE — H&P ADULT - PROBLEM SELECTOR PLAN 2
Likely secondary to COVID-19.  Continue supplemental O2 by nasal cannulae and titrate for O2 >88 and <92%  Patient is a former smoker, possible COPD component as well. Likely secondary to COVID-19.  Continue supplemental O2 by nasal cannulae and titrate for O2 >88 and <92%  Patient is a former smoker, possible COPD , sleep apnea component as well.

## 2020-04-02 NOTE — CONSULT NOTE ADULT - SUBJECTIVE AND OBJECTIVE BOX
[facility] [place]                                                                                                 ([facility])                                                                                                 CONSULTATION NOTE       History obtained by: Patient, family and medical record     obtained: No    Primary Cardiologist:     Chief complaint:    Patient is a 74y old  Male who presents with a chief complaint of     HPI:        REVIEW OF SYMPTOMS:     ALL OTHER REVIEW OF SYSTEMS ARE NEGATIVE.    MEDICATIONS  (STANDING):  cefTRIAXone   IVPB 1000 milliGRAM(s) IV Intermittent once    MEDICATIONS  (PRN):        PAST MEDICAL & SURGICAL HISTORY:  HTN (hypertension)  DM (diabetes mellitus)      FAMILY HISTORY:    Non-Contributory    SOCIAL HISTORY:    CIGARETTES:  Denies    ALCOHOL: Denies    DRUGS: Denies    Vital Signs Last 24 Hrs  T(C): 37.6 (02 Apr 2020 13:35), Max: 37.6 (02 Apr 2020 13:35)  T(F): 99.6 (02 Apr 2020 13:35), Max: 99.6 (02 Apr 2020 13:35)  HR: 65 (02 Apr 2020 13:35) (65 - 65)  BP: 135/84 (02 Apr 2020 13:35) (135/84 - 135/84)  BP(mean): --  RR: 22 (02 Apr 2020 13:41) (22 - 22)  SpO2: 91% (02 Apr 2020 13:41) (85% - 91%)    PHYSICAL EXAM:            INTERPRETATION OF TELEMETRY:    ECG:    I&O's Detail      LABS:                        13.4   6.42  )-----------( 160      ( 02 Apr 2020 14:50 )             41.4     04-02    128<L>  |  89<L>  |  49.0<H>  ----------------------------<  304<H>  4.7   |  25.0  |  1.88<H>    Ca    8.6      02 Apr 2020 14:50    TPro  7.0  /  Alb  3.6  /  TBili  0.6  /  DBili  x   /  AST  62<H>  /  ALT  40  /  AlkPhos  43  04-02    CARDIAC MARKERS ( 02 Apr 2020 14:50 )  x     / 0.02 ng/mL / x     / x     / x [facility] [place]                                                                                                 ([facility])                                                                                                 CONSULTATION NOTE       History obtained by: Patient, family and medical record     obtained: No    Primary Cardiologist: NOne    Chief complaint:    Patient is a 74y old  Male who presents with a chief complaint of syncope    HPI:  Chart review  As per chart:    History of Present Illness:   Patient is a 74-year-old male former smoker with PMHx of DM2, HTN and sleep apnea (noncompliant with CPAP and stated that has not used cpap in years ) who was brought to [facility] ED after being found in the bathroom covered in faeces by his friend who called EMS. Patient has no recollection of some of these events but remembers the police in his house and being brought to the hospital. He denies any cp, no dizziness, couch, fever, shortness of breath in the preceding days but states he has been having progressive weakness for a few months now. He denies any prior similar episodes, no sick contacts or recent travel.  As per ED physician, patient hypoxic down to high 80s on room air, with improvement to 94% on 4L NC.      REVIEW OF SYMPTOMS:     ALL OTHER REVIEW OF SYSTEMS ARE NEGATIVE.    MEDICATIONS  (STANDING):  cefTRIAXone   IVPB 1000 milliGRAM(s) IV Intermittent once    MEDICATIONS  (PRN):    PAST MEDICAL & SURGICAL HISTORY:  HTN (hypertension)  DM (diabetes mellitus)    FAMILY HISTORY:    Non-Contributory    SOCIAL HISTORY:    CIGARETTES:  Denies    ALCOHOL: Denies    DRUGS: Denies    Vital Signs Last 24 Hrs  T(C): 37.6 (02 Apr 2020 13:35), Max: 37.6 (02 Apr 2020 13:35)  T(F): 99.6 (02 Apr 2020 13:35), Max: 99.6 (02 Apr 2020 13:35)  HR: 65 (02 Apr 2020 13:35) (65 - 65)  BP: 135/84 (02 Apr 2020 13:35) (135/84 - 135/84)  BP(mean): --  RR: 22 (02 Apr 2020 13:41) (22 - 22)  SpO2: 91% (02 Apr 2020 13:41) (85% - 91%)    PHYSICAL EXAM:            INTERPRETATION OF TELEMETRY:    ECG: SR, LAD, Low voltage qrs, inf infarct, PRWP cannot exclude ant infarct    Prior EKG:    Diagnosis Line Sinus tachycardia  Left axis deviation  Inferior infarct , age undetermined  Anterior infarct , age undetermined  Abnormal ECG    Confirmed by RITESH MONTEIRO ([id number]) on 9/30/2018 11:10:24 AM        I&O's Detail      LABS:                        13.4   6.42  )-----------( 160      ( 02 Apr 2020 14:50 )             41.4     04-02    128<L>  |  89<L>  |  49.0<H>  ----------------------------<  304<H>  4.7   |  25.0  |  1.88<H>    Ca    8.6      02 Apr 2020 14:50    TPro  7.0  /  Alb  3.6  /  TBili  0.6  /  DBili  x   /  AST  62<H>  /  ALT  40  /  AlkPhos  43  04-02    CARDIAC MARKERS ( 02 Apr 2020 14:50 )  x     / 0.02 ng/mL / x     / x     / x

## 2020-04-02 NOTE — H&P ADULT - PROBLEM SELECTOR PLAN 3
No sick contacts at home, likely infected from asymptomatic patient.  Will start hydroxychloroquine.  ID consultation placed.

## 2020-04-02 NOTE — H&P ADULT - PROBLEM SELECTOR PLAN 6
DASH/TLC diet.  BP slightly above target.  Unknown patient's outpatient medication.  Given MIKIE, can consider adding PO hydralazine or CCB if trends up. DASH/TLC diet.  BP slightly above target.  Unknown patient's outpatient medication.  Given MIKIE, can consider will start low dos b.blk F/u AM HbA1C  Lantus 30U qHS and ISS.  Consistent carbohydrate diet.

## 2020-04-02 NOTE — ED PROVIDER NOTE - CLINICAL SUMMARY MEDICAL DECISION MAKING FREE TEXT BOX
73 yo M PMH DM on metformin and insulin, HTN, former smoker (prescribed cpap 3 years ago but does not use regularly) presents BIBEMS for syncope. +LOC, reportedly found in own feces. hypoxic to 88 on RA with improvement to 94% on 4-6L NC. neuro exam with b/l LE drift and R beating nystagmus, no LKN. Will do workup for cardiogenic and neurologic syncope. Will get labs to eval for electrolyte abnormalities, ekg (which showed no acute abnormalities), CT head to eval for ICH, mass or other intracranial pathology, CT abd/pelvis given RLQ pain, syncope and found in own feces, ?intraabd pathology. No chest pain, equal radial pulses, normotensive so dissection very low on differential. Will get chest xray and place on cardiac monitor. Reeval but likely admit

## 2020-04-02 NOTE — H&P ADULT - PROBLEM SELECTOR PLAN 9
Patient remains full code.  He wants team to update his friend Eliceo Mariee [phone] about his care.

## 2020-04-02 NOTE — ED PROVIDER NOTE - ATTENDING CONTRIBUTION TO CARE
Mayda: I performed a face to face bedside interview with patient regarding history of present illness, review of symptoms and past medical history. I completed an independent physical exam.  I have discussed patient's plan of care with resident.   I agree with note as stated above including HISTORY OF PRESENT ILLNESS, HIV, PAST MEDICAL/SURGICAL/FAMILY/SOCIAL HISTORY, ALLERGIES AND HOME MEDICATIONS, REVIEW OF SYSTEMS, PHYSICAL EXAM, MEDICAL DECISION MAKING and any PROGRESS NOTES during the time I functioned as the attending physician for this patient unless otherwise noted. My brief assessment is as follows: 74M h/o HTN, DM presents s/p syncopal episode. Remembers waking up this morning, next thing he remembers is waking up in bathroom surrounded by police. Friend who checks in on the pt called police, police found him covered in feces. Pt was supposed to use CPAP at night for last 3 years, has not been using it. Pt satting 85% on RA, improved on NC. Denies fever (99.6 rectally), cough, SOB, sick contacts, travel.   Gen: Well appearing in NAD  Head: NC/AT  Neck: trachea midline  Resp:  + tachypnea, CTAB  CV: RRR  GI: +RLQ ttp. No rebound, no guardings.   Ext: no deformities  Neuro:  A&O appears non focal  Skin:  Warm and dry as visualized  Psych:  Normal affect and mood  Pt with syncopal episode. Concern for possible COVID given the desaturations. Plan for ECG, CXR, labs, CTAP, CTH, CT chest, covid screen. Likely admission for syncope and COVID PNA requiring O2 supplementation.

## 2020-04-02 NOTE — ED PROVIDER NOTE - PROGRESS NOTE DETAILS
Minna Brizuela MD PGY-2 two attempts made at US guided IV access, one in R AC and one in L upper arm, unsuccessful. Patient with working IV in R hand. Unable to get IV contrast 2/2 elevated Cr anyway. Minna Brizuela MD PGY-2 two attempts made at US guided IV access, one in R AC and one in L upper arm, the latter with small hematoma formation, unsuccessful attempts. Patient with working IV in R hand. Unable to get IV contrast 2/2 elevated Cr anyway. Minna Brizuela MD PGY-2 two attempts made at US guided IV access, one in R AC and one in L upper arm, the latter with small hematoma formation, unsuccessful attempts. Patient with working IV in R hand. Unable to get IV contrast 2/2 elevated Cr anyway so an IV above the wrist is not absolutely necessary at this time Minna Brizuela MD PGY-2 spoke with CT. patient should be getting CTs shortly. will follow up. spoke with hospitalist. OK to admit to tele/monitor but Dr. Lopez would like an ABG first. spoke with RT - RT to draw ABG

## 2020-04-02 NOTE — H&P ADULT - NSHPLABSRESULTS_GEN_ALL_CORE
13.4   6.42  )-----------( 160      ( 02 Apr 2020 14:50 )             41.4    04-02    128<L>  |  89<L>  |  49.0<H>  ----------------------------<  304<H>  4.7   |  25.0  |  1.88<H>    Ca    8.6      02 Apr 2020 14:50    TPro  7.0  /  Alb  3.6  /  TBili  0.6  /  DBili  x   /  AST  62<H>  /  ALT  40  /  AlkPhos  43  04-02    Sedimentation Rate, Erythrocyte: 42 mm/hr (04.02.20 @ 14:50)  Lactate Dehydrogenase, Serum: 607 U/L (04.02.20 @ 14:50)  Procalcitonin, Serum: 0.16 ng/mL (04.02.20 @ 14:50)  C-Reactive Protein, Serum: 8.55 mg/dL (04.02.20 @ 14:50)    COVID-19 PCR: Detected (04.02.20 @ 15:18)    < from: CT Chest No Cont (04.02.20 @ 19:17) >  FINDINGS:  CHEST:   LUNGS AND LARGE AIRWAYS:   PLEURA: There are diffuse, patchy bilateral perihilar and peripheral groundglass opacities in both upper and lower lung zones.  The central airways remain patent.  There are minimal atelectatic changes at the posterior costophrenic angles bilaterally. No significant pleural effusion is noted.    IMPRESSION:   Lung findings as discussed compatible with infection may be associated with atypical/viral pneumonitis; including COVID -19.  No acute intra-abdominal pathology demonstrated.  < end of copied text >

## 2020-04-02 NOTE — H&P ADULT - PROBLEM SELECTOR PLAN 8
Patient remains full code.  He wants team to update his friend Eliceo Mariee [phone] about his care. Heparin 5000 U SC BID  Aspiration and fall precautions.

## 2020-04-02 NOTE — ED ADULT NURSE NOTE - OBJECTIVE STATEMENT
pt synopsized in pt doesn't remember what happened just woke up to police being in his house c/o generalized weakness x 3-4 months No sick contacts. No chest pain, sob, HA, fever, chills, abd pain, n/v, vision changes.

## 2020-04-02 NOTE — H&P ADULT - PROBLEM SELECTOR PLAN 7
Heparin 5000 U SC BID  Aspiration and fall precautions. DASH/TLC diet.  BP slightly above target.  Unknown patient's outpatient medication.  Given MIKIE, can consider will start low dos b.blk

## 2020-04-02 NOTE — H&P ADULT - NSHPPHYSICALEXAM_GEN_ALL_CORE
Vital Signs Last 24 Hrs  T(C): 37.6 (02 Apr 2020 13:35), Max: 37.6 (02 Apr 2020 13:35)  T(F): 99.6 (02 Apr 2020 13:35), Max: 99.6 (02 Apr 2020 13:35)  HR: 65 (02 Apr 2020 13:35) (65 - 65)  BP: 135/84 (02 Apr 2020 13:35) (135/84 - 135/84)  RR: 22 (02 Apr 2020 13:41) (22 - 22)  SpO2: 91% (02 Apr 2020 13:41) (85% - 91%)    General: Patient is an elderly  male found lying supine in stretcher with supplemental oxygen by nasal cannulae in minimal respiratory distress.  HEENT: Normocephalic, atraumatic, EOMI, PEERL, clear conjunctivae, wearing surgical mask.  Neck: Supple.  Respiratory: Borderline tachypneic, no retractions, breaths sounds present bilaterally, mild diffuse rhonchi, no wheezing or crackles.  Cardiovascular: Regular rate and rhythm, no murmurs, rubs or gallop. No edema.  GI: Obese abdomen, normal bowel sounds, no tenderness to palpation.  Extremities: No clubbing no cyanosis, moving 4 limbs. Normal ROM. Distal pulses intact.  Neurologic: Alert and oriented x4. No gross deficits.  Psych: Good eye contact, normal speech and affect.  Skin: Thickened, dark skin over the extensor surface of elbows, no rashes. Vital Signs Last 24 Hrs  T(C): 37.6 (02 Apr 2020 13:35), Max: 37.6 (02 Apr 2020 13:35)  T(F): 99.6 (02 Apr 2020 13:35), Max: 99.6 (02 Apr 2020 13:35)  HR: 65 (02 Apr 2020 13:35) (65 - 65)  BP: 135/84 (02 Apr 2020 13:35) (135/84 - 135/84)  RR: 22 (02 Apr 2020 13:41) (22 - 22)  SpO2: 91% (02 Apr 2020 13:41) (85% - 91%)    General: Patient is an elderly  male found lying supine in stretcher with supplemental oxygen by nasal cannulae in minimal respiratory distress.  HEENT: Normocephalic, atraumatic, EOMI, PEERL, clear conjunctivae, wearing surgical mask.  Neck: Supple. no jvd  Respiratory: Borderline tachypneic, no retractions, breaths sounds present bilaterally, mild diffuse rhonchi, no wheezing or crackles.  Cardiovascular: Regular rate and rhythm, no murmurs, rubs or gallop. No edema.  GI: Obese abdomen, normal bowel sounds, no tenderness to palpation.  Extremities: No clubbing no cyanosis, moving 4 limbs. Normal ROM. Distal pulses intact.  Neurologic: Alert and oriented x4. No gross deficits. CNs intact.  Psych: Good eye contact, normal speech and affect.  Skin: Thickened, dark skin over the extensor surface of elbows, no rashes. no diaphoresis.

## 2020-04-02 NOTE — ED PROVIDER NOTE - PHYSICAL EXAMINATION
PHYSICAL EXAM:   General:  in no acute distress  HEENT: NC/AT, PERRLA, EOMI, mild R beating nystagmus, airway patent  Cardiovascular: regular rate and rhythm, + S1/S2, no murmurs, rubs, gallops appreciated  Respiratory: clear to auscultation bilaterally, diffusely mildly diminished aeration, mildly tachypneic  Abdominal: soft, obese, no rigidity, +RLQ ttp  Extremities: Radial pulses equal b/l  Neuro: Alert and oriented x3. CN2-12 grossly intact besides R beating nystagmus, No upper extremity drift. Bilateral LE strength 4/5, with b/l LE drift that does not hit the bed. Sensation intact to light touch in upper and lower extremities. FTN WNL  Psychiatric: appropriate mood and affect.   -Minna Brizuela PGY-2 PHYSICAL EXAM:   General:  in no acute distress  HEENT: NC/AT, PERRLA, EOMI, mild R beating nystagmus, airway patent  Cardiovascular: regular rate and rhythm, + S1/S2, no murmurs, rubs, gallops appreciated  Respiratory: clear to auscultation bilaterally, diffusely mildly diminished aeration, mildly tachypneic  Abdominal: soft, obese, no rigidity, +RLQ ttp  Extremities: Radial pulses equal b/l  Neuro: Alert and oriented x3. CN2-12 grossly intact besides R beating nystagmus, No upper extremity drift. Bilateral LE strength 4/5, with b/l LE drift that does not hit the bed. Sensation intact to light touch in upper and lower extremities. FTN WNL. NIHSS 2  Psychiatric: appropriate mood and affect.   -Minna Brizuela PGY-2

## 2020-04-02 NOTE — ED PROVIDER NOTE - OBJECTIVE STATEMENT
73 yo M PMH DM on metformin and insulin, HTN, former smoker (prescribed cpap 3 years ago but does not use regularly) presents BIBEMS for syncope. Patient states the last thing he remembers is the police in his house, but does not remember going to the bathroom this morning. He does remember waking up. Never syncopized before. No sick contacts. No chest pain, sob, HA, fever, chills, abd pain, n/v, vision changes. Endorses 3-4 months of generalized weakness making it difficult for him to stand up. Denies saddle anesthesia or urinary incontinence. Per triage note: EMS reported "that a friend who takes care of patient called 911 because he found the patient on the toilet covered in feces and unresponsive".  No AC. 85% on RA with improvement to 94% on 4 L NC. 73 yo M PMH DM on metformin and insulin, HTN, former smoker (prescribed cpap 3 years ago but does not use regularly) presents BIBEMS for syncope. Patient states the last thing he remembers is the police in his house, but does not remember going to the bathroom this morning. He does remember waking up. Never syncopized before. No sick contacts. No chest pain, sob, HA, fever, chills, abd pain, n/v, vision changes. Endorses 3-4 months of generalized weakness making it difficult for him to stand up. Denies saddle anesthesia or urinary incontinence. Per triage note: EMS reported "that a friend who takes care of patient called 911 because he found the patient on the toilet covered in feces and unresponsive".  No AC. 85% on RA with improvement to 94% on 4 L NC. No last known normal reported

## 2020-04-02 NOTE — ED ADULT TRIAGE NOTE - CHIEF COMPLAINT QUOTE
Pt reports waking up on the toilet with police in his bathroom.  Per EMS a friend who takes care of pt called 911 because he found the patient on the toilet covered in feces and unresponsive.  Pt arrives alert and oriented X 4.  Denies pain. denies blood thinners.  oxygen saturation 85% on room air.  Cleaned and placed on oxygen.

## 2020-04-02 NOTE — H&P ADULT - HISTORY OF PRESENT ILLNESS
Patient is a 74-year-old former smoker man with PMHx of IDDM2, HTN and sleep apnea (noncompliant with CPAP) who was brought to [facility] ED after being found in the bathroom covered in faeces by his friend who called EMS. Patient has no recollection of some of these events but remembers the police in his house and being brought to the hospital. He denies any couch, fever, shortness of breath in the preceding days but states he has been having progressive weakness for a few months now. He denies any prior similar episodes, no sick contacts or recent travel.   As per ED physician, patient hypoxic down to high 80s on room air, with improvement to 94% on 4L NC. Patient is a 74-year-old male former smoker with PMHx of DM2, HTN and sleep apnea (noncompliant with CPAP and stated that has not used cpap in years ) who was brought to [facility] ED after being found in the bathroom covered in faeces by his friend who called EMS. Patient has no recollection of some of these events but remembers the police in his house and being brought to the hospital. He denies any cp, no dizziness, couch, fever, shortness of breath in the preceding days but states he has been having progressive weakness for a few months now. He denies any prior similar episodes, no sick contacts or recent travel.   As per ED physician, patient hypoxic down to high 80s on room air, with improvement to 94% on 4L NC. Patient is a 74-year-old male former smoker with PMHx of DM2, HTN and sleep apnea (noncompliant with CPAP and stated that has not used cpap in years ) who was brought to [facility] ED after being found in the bathroom covered in faeces by his friend who called EMS. Patient has no recollection of some of these events but remembers the police in his house and being brought to the hospital. He denies any cp, no dizziness, couch, fever, shortness of breath in the preceding days but states he has been having progressive weakness for a few months now. po intake appears to be loHe denies any prior similar episodes, no sick contacts or recent travel.   As per ED physician, patient hypoxic down to high 80s on room air, with improvement to 94% on 4L NC.

## 2020-04-02 NOTE — H&P ADULT - PROBLEM SELECTOR PLAN 4
Likely secondary to volume depletion, patient hemodynamically stable.  Cautious IV fluid resuscitation.  Encourage PO intake.  Avoid nephrotoxic medications Likely pre renal, poor po intake, will give gentle hydration and follow am labs.  patient hemodynamically stable.  Encourage PO intake.  Avoid nephrotoxic medications

## 2020-04-02 NOTE — ED PROVIDER NOTE - NS ED ROS FT
REVIEW OF SYSTEMS:  General:  no fever, no chills, +reportedly unresponsive in own feces  HEENT: no headache, no vision changes  Cardiac: no chest pain  Respiratory: no cough, no shortness of breath  Gastrointestinal: no abdominal pain, no nausea, no vomiting, no diarrhea  Neuro: +generalized weakness, no saddle anesthesia, no urinary incontinence,   -Minna Brizuela PGY-2

## 2020-04-02 NOTE — H&P ADULT - PROBLEM SELECTOR PLAN 1
Likely secondary to hypoxia and/or vasovagal episode due to volume depletion from poor PO intake. Likely secondary to hypoxia and/or vasovagal episode due to volume depletion from poor PO intake.  CT head negative  EKG unchanged from previous one.  Can consider Echo if clinically indicated Likely secondary to hypoxia and/or vasovagal episode due to volume depletion from poor PO intake.  CT head negative  EKG unchanged from previous one.  will hold Echo at this point due to corona pandemic, will do serial trop and monitor on tele for now and if clinical condition changes and there is an urgent need to do echo then will perform otherwise can have echo / carotid doppler as an out pt.  case discussed with cardiologist dr. Gomez who is in agreement as pt's syncopy might be related to vasovagal and poor po intake.

## 2020-04-02 NOTE — H&P ADULT - PROBLEM SELECTOR PLAN 5
F/u AM HbA1C  Lantus 50U qHS and ISS.  Consistent carbohydrate diet. F/u AM HbA1C  Lantus 30U qHS and ISS.  Consistent carbohydrate diet. Likely hypovolemic in the context of MIKIE.  Will give 1 slow bolus of 1000mL NS and reevaluate.

## 2020-04-02 NOTE — H&P ADULT - ASSESSMENT
Patient is a 74-year-old male with PMHx of IDDM2, HTN and sleep apnea who presented due to syncopal episode as well as hypoxia secondary to COVID-19 infection. Currently hemodynamically stable and with appropriate saturation while on supplemental O2    Admit to Medicine service.  Monitored bed and   Bed rest.

## 2020-04-03 NOTE — ED PROCEDURE NOTE - CPROC ED INFUS LINE DETAIL1
All lumen(s) aspirated and flushed without difficulty./The catheter was placed using sterile technique./Ultrasound guidance was used during placement./The location was identified, and the area was draped and prepped./The guidewire was recovered.

## 2020-04-03 NOTE — PROGRESS NOTE ADULT - PROBLEM SELECTOR PLAN 1
ICU admission  sedation via fentanyl /propofol,   hemodynamic support if needed with levophed  continue mechanical ventilation, abg daily and prn, albuterol MD, +D-dimer, if persistent hypoxema, difficult ventilation presents presume PE and treat  MIKIE, trend kidney function, brown catheter, strict I/O's, avoid nephrotoxic agents if possible  begin tube feeds , calculate goal, famotidine ICU admission  sedation via fentanyl /propofol, intermittent pushes as necessary of meds according to pain/agitation/delirium  hemodynamic support if needed with levophed  continue mechanical ventilation, abg daily and prn, albuterol MD, +D-dimer, if persistent hypoxema, difficult ventilation presents presume PE and treat  MIKIE, trend kidney function, brown catheter, strict I/O's, avoid nephrotoxic agents if possible, monitor electrolyte disturbances fix accordingly   begin tube feeds , calculate goal, famotidine  Monitor fever curve, leukocystosis, continue plaquenil   scd/heparin subq (morbid obese dose)

## 2020-04-03 NOTE — PROGRESS NOTE ADULT - SUBJECTIVE AND OBJECTIVE BOX
[facility]                                                               Office: [address] [address]                                                              Telephone: [phone]. Fax:[phone]                                                                             PROGRESS NOTE  Reason for follow up: Syncope/Hypoxia  Overnight: No new events.   Update: As of 4/3/2020, Due to the nature of this patient's COVID-19 isolation status, no bedside physical exam done to limit spread of infection. Examination highlights were provided by bedside nurse. Objective data were reviewed in detail. Pt maintained NSR HR @ 66, no ectopy.  Will sign off.    Vitals:  T(C): 37.1 (04-03-20 @ 15:51), Max: 37.8 (04-03-20 @ 05:03)  HR: 70 (04-03-20 @ 16:00) (56 - 87)  BP: 115/59 (04-03-20 @ 16:00) (79/60 - 202/91)  RR: 24 (04-03-20 @ 16:00) (14 - 30)  SpO2: 100% (04-03-20 @ 16:00) (78% - 100%)  Wt(kg): --  I&O's Summary    02 Apr 2020 07:01  -  03 Apr 2020 07:00  --------------------------------------------------------  IN: 221.6 mL / OUT: 360 mL / NET: -138.4 mL    03 Apr 2020 07:01  -  03 Apr 2020 18:18  --------------------------------------------------------  IN: 2535.7 mL / OUT: 600 mL / NET: 1935.7 mL      Weight (kg): 125.6 (04-03 @ 05:00)      PHYSICAL EXAM:  Deferred. Please refer      CURRENT MEDICATIONS:  ALBUTerol    90 MICROgram(s) HFA Inhaler  hydroxychloroquine  fentaNYL   Infusion  dexAMETHasone  IVPB  dextrose 50% Injectable  dextrose 50% Injectable  dextrose 50% Injectable  insulin glargine Injectable (LANTUS)  insulin lispro (HumaLOG) corrective regimen sliding scale  aspirin enteric coated  chlorhexidine 0.12% Liquid  chlorhexidine 2% Cloths  dextrose 5%.  heparin  Injectable  sodium chloride 0.9%.      OTHER: 	      LABS:	 	  CARDIAC MARKERS ( 02 Apr 2020 14:50 )  x     / 0.02 ng/mL / x     / x     / x      p-BNP 02 Apr 2020 14:50: 458 pg/mL                          13.2   10.26 )-----------( 164      ( 03 Apr 2020 03:52 )             40.0     04-03    129<L>  |  92<L>  |  50.0<H>  ----------------------------<  361<H>  4.5   |  23.0  |  1.71<H>    Ca    8.3<L>      03 Apr 2020 15:29  Phos  3.8     04-03  Mg     2.0     04-03    TPro  6.1<L>  /  Alb  3.1<L>  /  TBili  0.4  /  DBili  x   /  AST  142<H>  /  ALT  48<H>  /  AlkPhos  42  04-03    proBNP: Serum Pro-Brain Natriuretic Peptide: 458 pg/mL (04-02 @ 14:50)    Lipid Profile: Date: 04-03 @ 06:30  Total cholesterol 80; Direct LDL: 20; HDL: 21; Triglycerides:196    HgA1c: Hemoglobin A1C, Whole Blood: 9.6 %    TSH: Thyroid Stimulating Hormone, Serum: 0.47 uIU/mL        TELEMETRY: NSR HR @ 70s

## 2020-04-03 NOTE — PROGRESS NOTE ADULT - SUBJECTIVE AND OBJECTIVE BOX
HISTORY OF PRESENT ILLNESS:  74y Male PMH of HTN, CM, ROSA MARIA, presented to the ER status post syncopal episode, found to be hypoxemic secondary to COVID 19 infection. Cardiology has seen and evaluated patient as well.     PAST MEDICAL & SURGICAL HISTORY:  Diabetes mellitus  Essential hypertension  No significant past surgical history      MEDICATIONS  (STANDING):  aspirin enteric coated 81 milliGRAM(s) Oral daily  cisatracurium Infusion 3 MICROgram(s)/kG/Min (23.7 mL/Hr) IV Continuous <Continuous>  dextrose 5%. 1000 milliLiter(s) (50 mL/Hr) IV Continuous <Continuous>  dextrose 50% Injectable 12.5 Gram(s) IV Push once  dextrose 50% Injectable 25 Gram(s) IV Push once  dextrose 50% Injectable 25 Gram(s) IV Push once  heparin  Injectable 5000 Unit(s) SubCutaneous every 12 hours  hydroxychloroquine 400 milliGRAM(s) Oral every 12 hours  hydroxychloroquine 200 milliGRAM(s) Oral every 12 hours  hydroxychloroquine   Oral   insulin glargine Injectable (LANTUS) 30 Unit(s) SubCutaneous at bedtime  insulin lispro (HumaLOG) corrective regimen sliding scale   SubCutaneous three times a day before meals  insulin lispro (HumaLOG) corrective regimen sliding scale   SubCutaneous at bedtime  metoprolol tartrate 25 milliGRAM(s) Oral two times a day  propofol Infusion 5 MICROgram(s)/kG/Min (3.95 mL/Hr) IV Continuous <Continuous>  sodium chloride 0.9% Bolus 1000 milliLiter(s) IV Bolus once    MEDICATIONS  (PRN):  acetaminophen   Tablet .. 650 milliGRAM(s) Oral every 6 hours PRN Temp greater or equal to 38C (100.4F), Mild Pain (1 - 3)  ALBUTerol    90 MICROgram(s) HFA Inhaler 2 Puff(s) Inhalation every 6 hours PRN Shortness of Breath and/or Wheezing  dextrose 40% Gel 15 Gram(s) Oral once PRN Blood Glucose LESS THAN 70 milliGRAM(s)/deciliter  glucagon  Injectable 1 milliGRAM(s) IntraMuscular once PRN Glucose LESS THAN 70 milligrams/deciliter      lisinopril (Unknown)  losartan (Rash)    Intolerances      Review of Systems  Unable to assess patient is intubated and sedated at this time     PHYSICAL EXAM  Vital Signs Last 24 Hrs  T(C): 37.6 (02 Apr 2020 13:35), Max: 37.6 (02 Apr 2020 13:35)  T(F): 99.6 (02 Apr 2020 13:35), Max: 99.6 (02 Apr 2020 13:35)  HR: 70 (03 Apr 2020 00:31) (56 - 87)  BP: 119/58 (03 Apr 2020 00:31) (119/58 - 202/91)  BP(mean): --  RR: 18 (02 Apr 2020 23:56) (14 - 30)  SpO2: 92% (03 Apr 2020 00:31) (78% - 97%)    CONSTITUTIONAL:                                                                          WNL[ ]   Neuro: intubated and sedated                   Eyes: WNL[x ]   Normal exam of conjunctiva & lids, pupils equally reactive. Other     ENT: WNL[x ]    Normal exam of nasal/oral mucosa with absence of cyanosis. Other  Neck: WNL[x ]  Normal exam of jugular veins, trachea & thyroid. Other  Chest: WNL[ x] Normal lung exam with good air movement absence of wheezes, rales, or rhonchi: Other                                                                                CV:  Auscultation: normal [x ] S3[ ] S4[ ] Irregular [ ] Rub[ ] Clicks[ ]    Murmurs none:[x ]systolic [ ]  diastolic [ ] holosystolic [ ]  Carotids: No Bruits[x ] Other                 Abdominal Aorta: normal [ ] nonpalpable[ ]Other                                                                                      GI:           WNL[x ] Normal exam of abdomen, liver & spleen with no noted masses or tenderness. Other                                                                                                        Extremities: WNL[x ] Normal no evidence of cyanosis or deformity Edema: none[ ]trace[ ]1+[ ]2+[ ]3+[ ]4+[ ]  SKIN :WNL[ ] Normal exam to inspection & palation. Other:                                                                                13.2   10.26 )-----------( 164      ( 03 Apr 2020 03:52 )             40.0     04-03    132<L>  |  94<L>  |  43.0<H>  ----------------------------<  217<H>  4.0   |  25.0  |  1.39<H>    Ca    8.0<L>      03 Apr 2020 03:27  Mg     1.9     04-03    TPro  6.1<L>  /  Alb  3.1<L>  /  TBili  0.4  /  DBili  x   /  AST  142<H>  /  ALT  48<H>  /  AlkPhos  42  04-03    ABG - ( 03 Apr 2020 03:47 )  pH, Arterial: 7.28  pH, Blood: x     /  pCO2: 55    /  pO2: 194   / HCO3: 23    / Base Excess: -1.5  /  SaO2: 99

## 2020-04-03 NOTE — ED PROCEDURE NOTE - ATTENDING CONTRIBUTION TO CARE
I personally saw the patient with the resident, and completed the key components of the history and physical exam. I then discussed the management plan with the resident.
I personally saw the patient with the resident, and completed the key components of the history and physical exam. I then discussed the management plan with the resident.

## 2020-04-03 NOTE — CONSULT NOTE ADULT - PROBLEM SELECTOR RECOMMENDATION 9
Patient is COVID positive   Will require plaquenil, trend QT interval   Serial ferritin, LDH, CPR, and d dimer levels

## 2020-04-03 NOTE — CONSULT NOTE ADULT - SUBJECTIVE AND OBJECTIVE BOX
Consult requesting by: Dr. Lopez     HISTORY OF PRESENT ILLNESS:  74y Male PMH of HTN, CM, ROSA MARIA, presented to the ER status post syncopal episode, found to be hypoxemic secondary to COVID 19 infection. Cardiology has seen and evaluated patient as well.     PAST MEDICAL & SURGICAL HISTORY:  Diabetes mellitus  Essential hypertension  No significant past surgical history      MEDICATIONS  (STANDING):  aspirin enteric coated 81 milliGRAM(s) Oral daily  cisatracurium Infusion 3 MICROgram(s)/kG/Min (23.7 mL/Hr) IV Continuous <Continuous>  dextrose 5%. 1000 milliLiter(s) (50 mL/Hr) IV Continuous <Continuous>  dextrose 50% Injectable 12.5 Gram(s) IV Push once  dextrose 50% Injectable 25 Gram(s) IV Push once  dextrose 50% Injectable 25 Gram(s) IV Push once  heparin  Injectable 5000 Unit(s) SubCutaneous every 12 hours  hydroxychloroquine 400 milliGRAM(s) Oral every 12 hours  hydroxychloroquine 200 milliGRAM(s) Oral every 12 hours  hydroxychloroquine   Oral   insulin glargine Injectable (LANTUS) 30 Unit(s) SubCutaneous at bedtime  insulin lispro (HumaLOG) corrective regimen sliding scale   SubCutaneous three times a day before meals  insulin lispro (HumaLOG) corrective regimen sliding scale   SubCutaneous at bedtime  metoprolol tartrate 25 milliGRAM(s) Oral two times a day  propofol Infusion 5 MICROgram(s)/kG/Min (3.95 mL/Hr) IV Continuous <Continuous>  sodium chloride 0.9% Bolus 1000 milliLiter(s) IV Bolus once    MEDICATIONS  (PRN):  acetaminophen   Tablet .. 650 milliGRAM(s) Oral every 6 hours PRN Temp greater or equal to 38C (100.4F), Mild Pain (1 - 3)  ALBUTerol    90 MICROgram(s) HFA Inhaler 2 Puff(s) Inhalation every 6 hours PRN Shortness of Breath and/or Wheezing  dextrose 40% Gel 15 Gram(s) Oral once PRN Blood Glucose LESS THAN 70 milliGRAM(s)/deciliter  glucagon  Injectable 1 milliGRAM(s) IntraMuscular once PRN Glucose LESS THAN 70 milligrams/deciliter      lisinopril (Unknown)  losartan (Rash)    Intolerances        SOCIAL HISTORY:  Smoker: [ ] Yes  [ ] No        PACK YEARS:                         WHEN QUIT?  ETOH use: [ ] Yes  [ ] No              FREQUENCY / QUANTITY:  Ilicit Drug use:  [ ] Yes  [ ] No  Occupation:  Live with:  Assisted device use:    FAMILY HISTORY:  No pertinent family history in first degree relatives      Review of Systems  Unable to assess patient is intubated and sedated at this time     PHYSICAL EXAM  Vital Signs Last 24 Hrs  T(C): 37.6 (02 Apr 2020 13:35), Max: 37.6 (02 Apr 2020 13:35)  T(F): 99.6 (02 Apr 2020 13:35), Max: 99.6 (02 Apr 2020 13:35)  HR: 70 (03 Apr 2020 00:31) (56 - 87)  BP: 119/58 (03 Apr 2020 00:31) (119/58 - 202/91)  BP(mean): --  RR: 18 (02 Apr 2020 23:56) (14 - 30)  SpO2: 92% (03 Apr 2020 00:31) (78% - 97%)    CONSTITUTIONAL:                                                                          WNL[ ]   Neuro: intubated and sedated                   Eyes: WNL[x ]   Normal exam of conjunctiva & lids, pupils equally reactive. Other     ENT: WNL[x ]    Normal exam of nasal/oral mucosa with absence of cyanosis. Other  Neck: WNL[x ]  Normal exam of jugular veins, trachea & thyroid. Other  Chest: WNL[ x] Normal lung exam with good air movement absence of wheezes, rales, or rhonchi: Other                                                                                CV:  Auscultation: normal [x ] S3[ ] S4[ ] Irregular [ ] Rub[ ] Clicks[ ]    Murmurs none:[x ]systolic [ ]  diastolic [ ] holosystolic [ ]  Carotids: No Bruits[x ] Other                 Abdominal Aorta: normal [ ] nonpalpable[ ]Other                                                                                      GI:           WNL[x ] Normal exam of abdomen, liver & spleen with no noted masses or tenderness. Other                                                                                                        Extremities: WNL[x ] Normal no evidence of cyanosis or deformity Edema: none[ ]trace[ ]1+[ ]2+[ ]3+[ ]4+[ ]  SKIN :WNL[ ] Normal exam to inspection & palation. Other:                                                          LABS:                        13.4   6.42  )-----------( 160      ( 02 Apr 2020 14:50 )             41.4     04-02    128<L>  |  89<L>  |  49.0<H>  ----------------------------<  304<H>  4.7   |  25.0  |  1.88<H>    Ca    8.6      02 Apr 2020 14:50    TPro  7.0  /  Alb  3.6  /  TBili  0.6  /  DBili  x   /  AST  62<H>  /  ALT  40  /  AlkPhos  43  04-02

## 2020-04-03 NOTE — PROGRESS NOTE ADULT - ATTENDING COMMENTS
The patient was seen and examined  Events noted  No new problems    Neurologic:  Sedated, RASS = -4  Respiratory:  AC, PEEP 15, IBB=163  hemodynamic:  Norepinephrine--but now off  Renal:  Urine flow okay  GI:  NPO  Hematologic:  Hgb okay  ID:  CAP coverage    Plan:  Will wean ventilator support  Start nutritional support  DVT prophylaxis

## 2020-04-04 NOTE — DIETITIAN INITIAL EVALUATION ADULT. - PROBLEM SELECTOR PLAN 4
Likely pre renal, poor po intake, will give gentle hydration and follow am labs.  patient hemodynamically stable.  Encourage PO intake.  Avoid nephrotoxic medications

## 2020-04-04 NOTE — DIETITIAN INITIAL EVALUATION ADULT. - PROBLEM SELECTOR PLAN 7
DASH/TLC diet.  BP slightly above target.  Unknown patient's outpatient medication.  Given MIKIE, can consider will start low dos b.blk

## 2020-04-04 NOTE — DIETITIAN INITIAL EVALUATION ADULT. - ADD RECOMMEND
Rx: liquid MVI, thiamine, and vit C supplementation. Monitor tube feed tolerance. Obtain daily weights as feasible.

## 2020-04-04 NOTE — DIETITIAN INITIAL EVALUATION ADULT. - OTHER INFO
74 year old male PMH IDDM2, HTN, ROSA MARIA presented due to syncopal episode, hypoxia secondary to COVID-19 infection. Pt currently intubated/sedated. Tube feeds of Glucerna 1.5 ordered for goal rate of of 50 ml/hr (x20 hrs); also ordered to receive Prostat 30 ml once daily to (total provides 1530 ml, 1600 kcal, 98g protein per day). 74 year old male PMH IDDM2, HTN, ROSA MARIA presented due to syncopal episode, hypoxia secondary to COVID-19 infection. Pt currently intubated/sedated. Tube feeds of Glucerna 1.5 ordered for goal rate of 50 ml/hr (x20 hrs); also ordered to receive Prostat 30 ml once daily to (total provides 1530 ml, 1600 kcal, 98g protein per day).

## 2020-04-04 NOTE — DIETITIAN INITIAL EVALUATION ADULT. - PERTINENT MEDS FT
MEDICATIONS  (STANDING):  ALBUTerol    90 MICROgram(s) HFA Inhaler 6 Puff(s) Inhalation every 4 hours  aMIOdarone Infusion 1 mG/Min (33.3 mL/Hr) IV Continuous <Continuous>  aMIOdarone IVPB 150 milliGRAM(s) IV Intermittent once  aspirin enteric coated 81 milliGRAM(s) Oral daily  chlorhexidine 0.12% Liquid 15 milliLiter(s) Oral Mucosa every 12 hours  chlorhexidine 2% Cloths 1 Application(s) Topical daily  dexAMETHasone  IVPB 10 milliGRAM(s) IV Intermittent daily  dexMEDEtomidine Infusion 0.3 MICROgram(s)/kG/Hr (9.42 mL/Hr) IV Continuous <Continuous>  dextrose 5%. 1000 milliLiter(s) (50 mL/Hr) IV Continuous <Continuous>  dextrose 50% Injectable 12.5 Gram(s) IV Push once  dextrose 50% Injectable 25 Gram(s) IV Push once  dextrose 50% Injectable 25 Gram(s) IV Push once  fentaNYL   Infusion 1 MICROgram(s)/kG/Hr (13.2 mL/Hr) IV Continuous <Continuous>  heparin  Injectable 7500 Unit(s) SubCutaneous every 8 hours  hydroxychloroquine 200 milliGRAM(s) Oral every 12 hours  hydroxychloroquine   Oral   insulin glargine Injectable (LANTUS) 30 Unit(s) SubCutaneous at bedtime  insulin regular Infusion 8 Unit(s)/Hr (8 mL/Hr) IV Continuous <Continuous>  midazolam Injectable 4 milliGRAM(s) IV Push once  norepinephrine Infusion 0.05 MICROgram(s)/kG/Min (11.8 mL/Hr) IV Continuous <Continuous>    MEDICATIONS  (PRN):  acetaminophen   Tablet .. 650 milliGRAM(s) Oral every 6 hours PRN Temp greater or equal to 38C (100.4F), Mild Pain (1 - 3)  dextrose 40% Gel 15 Gram(s) Oral once PRN Blood Glucose LESS THAN 70 milliGRAM(s)/deciliter  glucagon  Injectable 1 milliGRAM(s) IntraMuscular once PRN Glucose LESS THAN 70 milligrams/deciliter  HYDROmorphone  Injectable 1 milliGRAM(s) IV Push every 3 hours PRN Breakthrough pain  midazolam Injectable 4 milliGRAM(s) IV Push once PRN agitation

## 2020-04-04 NOTE — DIETITIAN INITIAL EVALUATION ADULT. - PROBLEM SELECTOR PLAN 2
Likely secondary to COVID-19.  Continue supplemental O2 by nasal cannulae and titrate for O2 >88 and <92%  Patient is a former smoker, possible COPD , sleep apnea component as well.

## 2020-04-04 NOTE — DIETITIAN INITIAL EVALUATION ADULT. - PROBLEM SELECTOR PLAN 1
Likely secondary to hypoxia and/or vasovagal episode due to volume depletion from poor PO intake.  CT head negative  EKG unchanged from previous one.  will hold Echo at this point due to corona pandemic, will do serial trop and monitor on tele for now and if clinical condition changes and there is an urgent need to do echo then will perform otherwise can have echo / carotid doppler as an out pt.  case discussed with cardiologist dr. Gomez who is in agreement as pt's syncopy might be related to vasovagal and poor po intake.

## 2020-04-04 NOTE — PROGRESS NOTE ADULT - SUBJECTIVE AND OBJECTIVE BOX
INTERVAL HPI/OVERNIGHT EVENTS/SUBJECTIVE:  74y Male PMH of HTN, CM, ROSA MARIA, with hypoxic respiratory failure secondary to covid-19 infection. Overnight, patient developed rapid afib with RVR.Was given lopressor. Unable to return to NSR by this AM. Patient was cardioverted and now remains in NSR. Patient with elevated blood glucose levels, started on insulin gtt. P:F 325 this AM. Adequate UOP. Intubated on verced and precedex.       ICU Vital Signs Last 24 Hrs  T(C): 35.6 (04 Apr 2020 11:35), Max: 37.1 (03 Apr 2020 15:51)  T(F): 96.1 (04 Apr 2020 11:35), Max: 98.7 (03 Apr 2020 15:51)  HR: 61 (04 Apr 2020 14:23) (61 - 137)  BP: 136/65 (04 Apr 2020 12:00) (97/54 - 148/65)  BP(mean): 94 (04 Apr 2020 12:00) (72 - 106)  ABP: 137/59 (04 Apr 2020 14:23) (93/52 - 148/62)  ABP(mean): 81 (04 Apr 2020 14:23) (62 - 87)  RR: 24 (04 Apr 2020 14:23) (22 - 24)  SpO2: 100% (04 Apr 2020 14:23) (85% - 100%)      I&O's Detail    03 Apr 2020 07:01  -  04 Apr 2020 07:00  --------------------------------------------------------  IN:    fentaNYL  Infusion: 137.5 mL    Glucerna 1.5: 720 mL    norepinephrine Infusion: 28.2 mL    sodium chloride 0.9%: 1000 mL    Sodium Chloride 0.9% IV Bolus: 1000 mL  Total IN: 2885.7 mL    OUT:    Indwelling Catheter - Urethral: 1280 mL  Total OUT: 1280 mL    Total NET: 1605.7 mL    ABG - ( 04 Apr 2020 05:21 )  pH, Arterial: 7.24  pH, Blood: x     /  pCO2: 58    /  pO2: 163   / HCO3: 21    / Base Excess: -3.8  /  SaO2: 99        MEDICATIONS  (STANDING):  ALBUTerol    90 MICROgram(s) HFA Inhaler 6 Puff(s) Inhalation every 4 hours  aMIOdarone Infusion 1 mG/Min (33.3 mL/Hr) IV Continuous <Continuous>  aspirin enteric coated 81 milliGRAM(s) Oral daily  chlorhexidine 0.12% Liquid 15 milliLiter(s) Oral Mucosa every 12 hours  chlorhexidine 2% Cloths 1 Application(s) Topical daily  dexAMETHasone  IVPB 10 milliGRAM(s) IV Intermittent daily  dexMEDEtomidine Infusion 0.3 MICROgram(s)/kG/Hr (9.42 mL/Hr) IV Continuous <Continuous>  dextrose 5%. 1000 milliLiter(s) (50 mL/Hr) IV Continuous <Continuous>  dextrose 50% Injectable 12.5 Gram(s) IV Push once  dextrose 50% Injectable 25 Gram(s) IV Push once  dextrose 50% Injectable 25 Gram(s) IV Push once  fentaNYL   Infusion 1 MICROgram(s)/kG/Hr (13.2 mL/Hr) IV Continuous <Continuous>  heparin  Injectable 7500 Unit(s) SubCutaneous every 8 hours  hydroxychloroquine 200 milliGRAM(s) Oral every 12 hours  hydroxychloroquine   Oral   insulin glargine Injectable (LANTUS) 30 Unit(s) SubCutaneous at bedtime  insulin regular Infusion 8 Unit(s)/Hr (8 mL/Hr) IV Continuous <Continuous>  norepinephrine Infusion 0.05 MICROgram(s)/kG/Min (11.8 mL/Hr) IV Continuous <Continuous>    MEDICATIONS  (PRN): Other                                                                                                        Extremities: WNL[x ] Normal no evidence of cyanosis or deformity Edema: none[ ]trace[ ]1+[ ]2+[ ]3+[ ]4+[ ]  SKIN :WNL[ ] Normal exam to inspection & palation. Other:                                                                   acetaminophen   Tablet .. 650 milliGRAM(s) Oral every 6 hours PRN Temp greater or equal to 38C (100.4F), Mild Pain (1 - 3)  dextrose 40% Gel 15 Gram(s) Oral once PRN Blood Glucose LESS THAN 70 milliGRAM(s)/deciliter  glucagon  Injectable 1 milliGRAM(s) IntraMuscular once PRN Glucose LESS THAN 70 milligrams/deciliter  HYDROmorphone  Injectable 1 milliGRAM(s) IV Push every 3 hours PRN Breakthrough pain  midazolam Injectable 4 milliGRAM(s) IV Push once PRN agitation    MISC:     PHYSICAL EXAM:  Gen: morbid obesity  Neuro: intubated and sedated                                                                                             CV:  NSR s/p cardioversion  Pulm: intubated                                                                                  GI: soft, NTND      LABS:  CBC Full  -  ( 04 Apr 2020 04:50 )  WBC Count : 11.82 K/uL  RBC Count : 4.50 M/uL  Hemoglobin : 13.3 g/dL  Hematocrit : 41.9 %  Platelet Count - Automated : 225 K/uL  Mean Cell Volume : 93.1 fl  Mean Cell Hemoglobin : 29.6 pg  Mean Cell Hemoglobin Concentration : 31.7 gm/dL  Auto Neutrophil # : 10.77 K/uL  Auto Lymphocyte # : 0.37 K/uL  Auto Monocyte # : 0.57 K/uL  Auto Eosinophil # : 0.00 K/uL  Auto Basophil # : 0.01 K/uL  Auto Neutrophil % : 91.2 %  Auto Lymphocyte % : 3.1 %  Auto Monocyte % : 4.8 %  Auto Eosinophil % : 0.0 %  Auto Basophil % : 0.1 %    04-04    136  |  98  |  53.0<H>  ----------------------------<  201<H>  4.3   |  20.0<L>  |  1.63<H>    Ca    8.3<L>      04 Apr 2020 04:50  Phos  3.9     04-04  Mg     2.2     04-04    TPro  6.1<L>  /  Alb  3.1<L>  /  TBili  0.4  /  DBili  x   /  AST  142<H>  /  ALT  48<H>  /  AlkPhos  42  04-03    PTT - ( 03 Apr 2020 06:29 )  PTT:27.4 sec    RECENT CULTURES:      LIVER FUNCTIONS - ( 03 Apr 2020 03:27 )  Alb: 3.1 g/dL / Pro: 6.1 g/dL / ALK PHOS: 42 U/L / ALT: 48 U/L / AST: 142 U/L / GGT: x           ASSESSMENT/PLAN:  74yMale presenting with: COVID 19. Remains intubated in SCIU. s/p cardioversion for afib this am.   -sedation via fentanyl /propofol, intermittent pushes as necessary of meds according to pain/agitation/delirium  -hemodynamic support if needed with levophed  -continue mechanical ventilation, abg daily and prn  - improving MIKIE - then BMP, brown catheter, strict I/O's, avoid nephrotoxic agents if possible, monitor electrolyte disturbances fix accordingly   -continue tube feeds  -Monitor fever curve, leukocystosis, continue plaquenil   -scd/heparin subq (morbid obese dose).

## 2020-04-04 NOTE — PROGRESS NOTE ADULT - ATTENDING COMMENTS
The patient was seen and examined  Events noted  No new problems    Neurologic:  Sedated, RASS -3  Respiratory:  AC 40%, PEEP 15; IBI=951  Hemodynamic:  Norepinephrine, Afib with RVR  Renal:  Urine flow acceptable  GI:  TEN  Endocrine:  Insulin infusion  Hematologic:  Hgb okay  ID:  No new problems    Plan:  Cardioversion  Wean norepinephrine  Glycemic control  Nutritional support  DVT prophylaxis    35 minutes providing critical care

## 2020-04-04 NOTE — DIETITIAN INITIAL EVALUATION ADULT. - PERTINENT LABORATORY DATA
04-04 Na136 mmol/L Glu 201 mg/dL<H> K+ 4.3 mmol/L Cr  1.63 mg/dL<H> BUN 53.0 mg/dL<H> Phos 3.9 mg/dL Alb n/a   PAB n/a HgA1c 9.6%

## 2020-04-04 NOTE — DIETITIAN INITIAL EVALUATION ADULT. - PROBLEM SELECTOR PLAN 9
Patient remains full code.  He wants team to update his friend Eliceo Mariee [phone] about his care.

## 2020-04-04 NOTE — DIETITIAN INITIAL EVALUATION ADULT. - ENTERAL
goal rate of 55 ml/hr (x20 hrs) to provide 1100 ml, 1650 kcal, 91g protein, 835 ml free water, and >100% of RDIs for vitamins/minerals; continue Prostat 30 ml once daily (tube feeds + prostat will provide a total of 1750 kcal and 106g protein daily). If alternative feeding method warranted, recommend gravity feeds (275 ml q6 hrs). Additional free water per MD discretion.

## 2020-04-05 NOTE — CHART NOTE - NSCHARTNOTEFT_GEN_A_CORE
Consulted cardio , spoke with Dr. Wright - they will see patient when he is off covid floor  Per Dr. Wright - recommend continue amio gtt and start AC if patient returns to afib Consulted cardio , spoke with Dr. Wright - they will see patient when he is off covid floor  Per Dr. Wright - recommend continue amio gtt and start AC if patient returns to afib. recommend half the dose of amio for bradycardia

## 2020-04-05 NOTE — CHART NOTE - NSCHARTNOTEFT_GEN_A_CORE
Discussed 24H events with patient's son Claudio. Explained that he is responding to therapy on the ventilator at this time and that he responded well to electrical cardioversion, currently off pressors. He was appreciative of the communication and all questions were answered.

## 2020-04-05 NOTE — CHART NOTE - NSCHARTNOTEFT_GEN_A_CORE
I did not do a formal consultation.  Chart reviewed.    Patient in ICU on Vent for COVID Pneumonia.  Patient went into in Atrial Fibrillation with RVR on 4/4/2020 requiring Cardioversion.  Patient is on IV Amiodarone drip, now maintaining NSR.  Not on Anticoagulation.  Suggest IV Anticoagulation to be started if he has recurrent Atrial Fibrillation.    Discussed with MARIA ESTHER De La Garza in ICU.

## 2020-04-05 NOTE — PROGRESS NOTE ADULT - SUBJECTIVE AND OBJECTIVE BOX
INTERVAL HPI/OVERNIGHT EVENTS/SUBJECTIVE:  vent day 3   cardioverted yesterday for rapid afib with avf. has been nsr-sinus anca with first degree hb since cardioversion.     ICU Vital Signs Last 24 Hrs  T(C): 36.1 (05 Apr 2020 08:04), Max: 36.8 (04 Apr 2020 16:39)  T(F): 97 (05 Apr 2020 08:04), Max: 98.3 (04 Apr 2020 16:39)  HR: 45 (05 Apr 2020 10:00) (45 - 68)  BP: 97/58 (05 Apr 2020 08:00) (97/58 - 162/77)  BP(mean): 72 (05 Apr 2020 08:00) (72 - 111)  ABP: 113/66 (05 Apr 2020 10:00) (84/56 - 187/72)  ABP(mean): 81 (05 Apr 2020 10:00) (62 - 107)  RR: 24 (05 Apr 2020 10:00) (24 - 24)  SpO2: 100% (05 Apr 2020 10:00) (85% - 100%)      I&O's Detail    04 Apr 2020 07:01  -  05 Apr 2020 07:00  --------------------------------------------------------  IN:    amiodarone Infusion: 178.9 mL    dexmedetomidine Infusion: 100 mL    fentaNYL  Infusion: 279 mL    Glucerna 1.5: 450 mL    insulin regular Infusion: 2 mL    insulin regular Infusion: 42 mL    norepinephrine Infusion: 2 mL    propofol Infusion: 75.2 mL  Total IN: 1129.1 mL    OUT:    Indwelling Catheter - Urethral: 342 mL  Total OUT: 342 mL    Total NET: 787.1 mL            ABG - ( 05 Apr 2020 04:46 )  pH, Arterial: 7.42  pH, Blood: x     /  pCO2: 36    /  pO2: 103   / HCO3: 24    / Base Excess: -1.0  /  SaO2: 98                  MEDICATIONS  (STANDING):  ALBUTerol    90 MICROgram(s) HFA Inhaler 6 Puff(s) Inhalation every 4 hours  aMIOdarone Infusion 0.5 mG/Min (16.7 mL/Hr) IV Continuous <Continuous>  aspirin enteric coated 81 milliGRAM(s) Oral daily  chlorhexidine 0.12% Liquid 15 milliLiter(s) Oral Mucosa every 12 hours  chlorhexidine 2% Cloths 1 Application(s) Topical daily  dexAMETHasone  IVPB 10 milliGRAM(s) IV Intermittent daily  dextrose 5%. 1000 milliLiter(s) (50 mL/Hr) IV Continuous <Continuous>  dextrose 50% Injectable 12.5 Gram(s) IV Push once  dextrose 50% Injectable 25 Gram(s) IV Push once  dextrose 50% Injectable 25 Gram(s) IV Push once  fentaNYL   Infusion 1 MICROgram(s)/kG/Hr (13.2 mL/Hr) IV Continuous <Continuous>  heparin  Injectable 7500 Unit(s) SubCutaneous every 8 hours  hydroxychloroquine 200 milliGRAM(s) Oral every 12 hours  hydroxychloroquine   Oral   insulin glargine Injectable (LANTUS) 30 Unit(s) SubCutaneous at bedtime  insulin regular Infusion 2 Unit(s)/Hr (2 mL/Hr) IV Continuous <Continuous>  norepinephrine Infusion 0.05 MICROgram(s)/kG/Min (11.8 mL/Hr) IV Continuous <Continuous>  propofol Infusion 10 MICROgram(s)/kG/Min (7.54 mL/Hr) IV Continuous <Continuous>    MEDICATIONS  (PRN):  acetaminophen   Tablet .. 650 milliGRAM(s) Oral every 6 hours PRN Temp greater or equal to 38C (100.4F), Mild Pain (1 - 3)  dextrose 40% Gel 15 Gram(s) Oral once PRN Blood Glucose LESS THAN 70 milliGRAM(s)/deciliter  glucagon  Injectable 1 milliGRAM(s) IntraMuscular once PRN Glucose LESS THAN 70 milligrams/deciliter  HYDROmorphone  Injectable 1 milliGRAM(s) IV Push every 3 hours PRN Breakthrough pain      NUTRITION/IVF:     CENTRAL LINE:  LOCATION:   DATE INSERTED:  CVP:  SCVO2:    AVITIA:   DATE INSERTED:    A-LINE:    LOCATION:   DATE INSERTED:   SVV:  CO/CI:     CHEST TUBE:  LOCATION:  DATE INSERTED: OUTPUT/24 HRS:  SUCTION/WATER SEAL:     NG/OG TUBE:  DATE INSERTED:  OUTPUT/24 HRS:    MISC:     PHYSICAL EXAM:    Gen:    Eyes:    Neurological:    ENMT:    Neck:    Pulmonary:    Cardiovascular:    Gastrointestinal:    Genitourinary:    Back:    Extremities:    Skin:    Musculoskeletal:          LABS:  CBC Full  -  ( 05 Apr 2020 04:50 )  WBC Count : 7.25 K/uL  RBC Count : 4.02 M/uL  Hemoglobin : 12.1 g/dL  Hematocrit : 37.0 %  Platelet Count - Automated : 167 K/uL  Mean Cell Volume : 92.0 fl  Mean Cell Hemoglobin : 30.1 pg  Mean Cell Hemoglobin Concentration : 32.7 gm/dL  Auto Neutrophil # : 6.35 K/uL  Auto Lymphocyte # : 0.43 K/uL  Auto Monocyte # : 0.40 K/uL  Auto Eosinophil # : 0.00 K/uL  Auto Basophil # : 0.00 K/uL  Auto Neutrophil % : 87.6 %  Auto Lymphocyte % : 5.9 %  Auto Monocyte % : 5.5 %  Auto Eosinophil % : 0.0 %  Auto Basophil % : 0.0 %    04-05    138  |  103  |  55.0<H>  ----------------------------<  114<H>  4.3   |  22.0  |  1.67<H>    Ca    8.5<L>      05 Apr 2020 04:50  Phos  1.9     04-05  Mg     2.3     04-05          RECENT CULTURES:  04-02 .Blood XXXX XXXX   No growth at 48 hours              CAPILLARY BLOOD GLUCOSE      RADIOLOGY & ADDITIONAL STUDIES:    ASSESSMENT/PLAN:  74yMale presenting with:    Neuro:    CV:    Pulm:    GI/Nutrition:    /Renal:    ID:    Lines/Tubes:    Endo:    Skin:    Proph:    Dispo:      CRITICAL CARE TIME SPENT: INTERVAL HPI/OVERNIGHT EVENTS/SUBJECTIVE:  vent day 3   cardioverted yesterday for rapid afib with avf. has been nsr-sinus anca with first degree hb since cardioversion. on amio gtt  oliguric     ICU Vital Signs Last 24 Hrs  T(C): 36.1 (05 Apr 2020 08:04), Max: 36.8 (04 Apr 2020 16:39)  T(F): 97 (05 Apr 2020 08:04), Max: 98.3 (04 Apr 2020 16:39)  HR: 45 (05 Apr 2020 10:00) (45 - 68)  BP: 97/58 (05 Apr 2020 08:00) (97/58 - 162/77)  BP(mean): 72 (05 Apr 2020 08:00) (72 - 111)  ABP: 113/66 (05 Apr 2020 10:00) (84/56 - 187/72)  ABP(mean): 81 (05 Apr 2020 10:00) (62 - 107)  RR: 24 (05 Apr 2020 10:00) (24 - 24)  SpO2: 100% (05 Apr 2020 10:00) (85% - 100%)      I&O's Detail    04 Apr 2020 07:01  -  05 Apr 2020 07:00  --------------------------------------------------------  IN:    amiodarone Infusion: 178.9 mL    dexmedetomidine Infusion: 100 mL    fentaNYL  Infusion: 279 mL    Glucerna 1.5: 450 mL    insulin regular Infusion: 2 mL    insulin regular Infusion: 42 mL    norepinephrine Infusion: 2 mL    propofol Infusion: 75.2 mL  Total IN: 1129.1 mL    OUT:    Indwelling Catheter - Urethral: 342 mL  Total OUT: 342 mL    Total NET: 787.1 mL      ABG - ( 05 Apr 2020 04:46 )  pH, Arterial: 7.42  pH, Blood: x     /  pCO2: 36    /  pO2: 103   / HCO3: 24    / Base Excess: -1.0  /  SaO2: 98        MEDICATIONS  (STANDING):  ALBUTerol    90 MICROgram(s) HFA Inhaler 6 Puff(s) Inhalation every 4 hours  aMIOdarone Infusion 0.5 mG/Min (16.7 mL/Hr) IV Continuous <Continuous>  aspirin enteric coated 81 milliGRAM(s) Oral daily  chlorhexidine 0.12% Liquid 15 milliLiter(s) Oral Mucosa every 12 hours  chlorhexidine 2% Cloths 1 Application(s) Topical daily  dexAMETHasone  IVPB 10 milliGRAM(s) IV Intermittent daily  dextrose 5%. 1000 milliLiter(s) (50 mL/Hr) IV Continuous <Continuous>  dextrose 50% Injectable 12.5 Gram(s) IV Push once  dextrose 50% Injectable 25 Gram(s) IV Push once  dextrose 50% Injectable 25 Gram(s) IV Push once  fentaNYL   Infusion 1 MICROgram(s)/kG/Hr (13.2 mL/Hr) IV Continuous <Continuous>  heparin  Injectable 7500 Unit(s) SubCutaneous every 8 hours  hydroxychloroquine 200 milliGRAM(s) Oral every 12 hours  hydroxychloroquine   Oral   insulin glargine Injectable (LANTUS) 30 Unit(s) SubCutaneous at bedtime  insulin regular Infusion 2 Unit(s)/Hr (2 mL/Hr) IV Continuous <Continuous>  norepinephrine Infusion 0.05 MICROgram(s)/kG/Min (11.8 mL/Hr) IV Continuous <Continuous>  propofol Infusion 10 MICROgram(s)/kG/Min (7.54 mL/Hr) IV Continuous <Continuous>    MEDICATIONS  (PRN):  acetaminophen   Tablet .. 650 milliGRAM(s) Oral every 6 hours PRN Temp greater or equal to 38C (100.4F), Mild Pain (1 - 3)  dextrose 40% Gel 15 Gram(s) Oral once PRN Blood Glucose LESS THAN 70 milliGRAM(s)/deciliter  glucagon  Injectable 1 milliGRAM(s) IntraMuscular once PRN Glucose LESS THAN 70 milligrams/deciliter  HYDROmorphone  Injectable 1 milliGRAM(s) IV Push every 3 hours PRN Breakthrough pain    MISC:     PHYSICAL EXAM:  Gen: morbid obesity  Neuro: intubated and sedated                                                                                             CV:  sinus anca on monitor  Pulm: intubated                                                                                  GI: soft, NTND    LABS:  CBC Full  -  ( 05 Apr 2020 04:50 )  WBC Count : 7.25 K/uL  RBC Count : 4.02 M/uL  Hemoglobin : 12.1 g/dL  Hematocrit : 37.0 %  Platelet Count - Automated : 167 K/uL  Mean Cell Volume : 92.0 fl  Mean Cell Hemoglobin : 30.1 pg  Mean Cell Hemoglobin Concentration : 32.7 gm/dL  Auto Neutrophil # : 6.35 K/uL  Auto Lymphocyte # : 0.43 K/uL  Auto Monocyte # : 0.40 K/uL  Auto Eosinophil # : 0.00 K/uL  Auto Basophil # : 0.00 K/uL  Auto Neutrophil % : 87.6 %  Auto Lymphocyte % : 5.9 %  Auto Monocyte % : 5.5 %  Auto Eosinophil % : 0.0 %  Auto Basophil % : 0.0 %    04-05    138  |  103  |  55.0<H>  ----------------------------<  114<H>  4.3   |  22.0  |  1.67<H>    Ca    8.5<L>      05 Apr 2020 04:50  Phos  1.9     04-05  Mg     2.3     04-05    RECENT CULTURES:  04-02 .Blood XXXX XXXX   No growth at 48 hours    ASSESSMENT/PLAN:  74yMale presenting with: COVID 19. Remains intubated in SCIU. s/p cardioversion for afib.on amio gtt  -sedation via fentanyl /propofol, intermittent pushes as necessary of meds according to pain/agitation/delirium  -hemodynamic support if needed with levophed  -continue mechanical ventilation, abg daily and prn  - improving MIKIE - then BMP, brown catheter, strict I/O's, avoid nephrotoxic agents if possible, monitor electrolyte disturbances fix accordingly   -continue tube feeds  -Monitor fever curve, leukocystosis, continue plaquenil   -scd/heparin subq (morbid obese dose).

## 2020-04-05 NOTE — PROGRESS NOTE ADULT - ATTENDING COMMENTS
73 yo M presents with ARDS due to COVID 19.  Intubated, vented, and sedated. Yesterday had cardioversion for afib and is on amio gtt. Today GCS 8T(purposeful, opens eyes)  Bardyacradic with heart block. Cardiology recommend Amio gtt and will start hep gtt if afib returns.  Troponin .22 and follow  trops  Continue sedation via fentanyl /propofol  Continue mechanical ventilation and wean as tolerated  Continue Covid protocol --plaquenil   VTE ppx SCD/heparin   Continue ICU care for cardiopulmonary support and Covid therapy        code [id number]  CCT 50

## 2020-04-06 NOTE — CHART NOTE - NSCHARTNOTEFT_GEN_A_CORE
Charting and recommendations based on EMR review at offsite location.     Ideal body weight:  68kg                           6-8mL/k-544  Vent: 24/550/50/15  Plateau pressure: N/A  ABG - ( 2020 04:06 )  pH, Arterial: 7.40  pH, Blood: x     /  pCO2: 38    /  pO2: 96    / HCO3: 24    / Base Excess: -0.6  /  SaO2: 98        T(C): 35.8 (20 @ 00:27), Max: 36.8 (20 @ 20:00)  HR: 50 (20 @ 03:00) (43 - 56)  BP: 115/58 (20 @ 00:00) (97/58 - 115/58)  RR: 24 (20 @ 03:00) (23 - 24)  SpO2: 94% (20 @ 03:00) (91% - 100%)    20 @ 07:01  -  20 @ 07:00  --------------------------------------------------------  IN: 1129.1 mL / OUT: 342 mL / NET: 787.1 mL    20 @ 07:01  -  20 @ 04:15  --------------------------------------------------------  IN: 1685.3 mL / OUT: 975 mL / NET: 710.3 mL      Sedation: Propofol/Fentanyl with PRN Dilaudid  Paralytic: N  Plaquenil: 4/3--  CRP: 7 on 4/3  A/C: Heparin subQ  Steroids: Y  IL6/IL1: N    Recommendations:  --Consider decreasing tidal volume 550 (~8.1ml/kg IBW) -->500 (~7.3ml/kg IBW) with increase in RR 24-->26  --Cautious titration of FiO2 50 -->40 with pO2 96  --Consider TB/HepC/HIV screen for workup of possible IL6/1 treatment.

## 2020-04-06 NOTE — PROGRESS NOTE ADULT - SUBJECTIVE AND OBJECTIVE BOX
INTERVAL HPI/OVERNIGHT EVENTS/SUBJECTIVE:  sinus anca on monitor. remains on amio gtt. troponins downtrending and leveled     ICU Vital Signs Last 24 Hrs  T(C): 36.8 (06 Apr 2020 12:07), Max: 36.8 (05 Apr 2020 20:00)  T(F): 98.2 (06 Apr 2020 12:07), Max: 98.2 (05 Apr 2020 20:00)  HR: 52 (06 Apr 2020 13:00) (43 - 53)  BP: 117/59 (06 Apr 2020 08:00) (102/57 - 117/59)  BP(mean): 82 (06 Apr 2020 08:00) (76 - 82)  ABP: 112/55 (06 Apr 2020 13:00) (105/61 - 145/66)  ABP(mean): 71 (06 Apr 2020 13:00) (71 - 99)  RR: 24 (06 Apr 2020 13:00) (23 - 24)  SpO2: 94% (06 Apr 2020 13:00) (91% - 98%)    I&O's Detail    05 Apr 2020 07:01  -  06 Apr 2020 07:00  --------------------------------------------------------  IN:    amiodarone Infusion: 182.3 mL    amiodarone Infusion: 80 mL    fentaNYL  Infusion: 575 mL    Glucerna 1.5: 1150 mL    insulin regular Infusion: 144 mL    propofol Infusion: 231 mL  Total IN: 2362.3 mL    OUT:    Indwelling Catheter - Urethral: 1305 mL  Total OUT: 1305 mL    Total NET: 1057.3 mL      06 Apr 2020 07:01  -  06 Apr 2020 14:39  --------------------------------------------------------  IN:    amiodarone Infusion: 24 mL    fentaNYL  Infusion: 75 mL    insulin regular Infusion: 12 mL    propofol Infusion: 30 mL  Total IN: 141 mL    OUT:    Indwelling Catheter - Urethral: 40 mL  Total OUT: 40 mL    Total NET: 101 mL    ABG - ( 06 Apr 2020 04:06 )  pH, Arterial: 7.40  pH, Blood: x     /  pCO2: 38    /  pO2: 96    / HCO3: 24    / Base Excess: -0.6  /  SaO2: 98        MEDICATIONS  (STANDING):  ALBUTerol    90 MICROgram(s) HFA Inhaler 6 Puff(s) Inhalation every 4 hours  aMIOdarone Infusion 0.24 mG/Min (8 mL/Hr) IV Continuous <Continuous>  aspirin enteric coated 81 milliGRAM(s) Oral daily  chlorhexidine 0.12% Liquid 15 milliLiter(s) Oral Mucosa every 12 hours  chlorhexidine 2% Cloths 1 Application(s) Topical daily  dextrose 5%. 1000 milliLiter(s) (50 mL/Hr) IV Continuous <Continuous>  dextrose 50% Injectable 12.5 Gram(s) IV Push once  dextrose 50% Injectable 25 Gram(s) IV Push once  dextrose 50% Injectable 25 Gram(s) IV Push once  fentaNYL   Infusion 1 MICROgram(s)/kG/Hr (13.2 mL/Hr) IV Continuous <Continuous>  heparin  Injectable 7500 Unit(s) SubCutaneous every 8 hours  hydroxychloroquine 200 milliGRAM(s) Oral every 12 hours  hydroxychloroquine   Oral   insulin glargine Injectable (LANTUS) 30 Unit(s) SubCutaneous at bedtime  insulin regular Infusion 2 Unit(s)/Hr (2 mL/Hr) IV Continuous <Continuous>  melatonin 5 milliGRAM(s) Oral at bedtime  propofol Infusion 10 MICROgram(s)/kG/Min (7.54 mL/Hr) IV Continuous <Continuous>    MEDICATIONS  (PRN):  acetaminophen   Tablet .. 650 milliGRAM(s) Oral every 6 hours PRN Temp greater or equal to 38C (100.4F), Mild Pain (1 - 3)  dextrose 40% Gel 15 Gram(s) Oral once PRN Blood Glucose LESS THAN 70 milliGRAM(s)/deciliter  glucagon  Injectable 1 milliGRAM(s) IntraMuscular once PRN Glucose LESS THAN 70 milligrams/deciliter  HYDROmorphone  Injectable 1 milliGRAM(s) IV Push every 3 hours PRN Breakthrough pain    LABS:  CBC Full  -  ( 05 Apr 2020 13:37 )  WBC Count : 9.04 K/uL  RBC Count : 3.84 M/uL  Hemoglobin : 11.9 g/dL  Hematocrit : 35.8 %  Platelet Count - Automated : 177 K/uL  Mean Cell Volume : 93.2 fl  Mean Cell Hemoglobin : 31.0 pg  Mean Cell Hemoglobin Concentration : 33.2 gm/dL  Auto Neutrophil # : 8.49 K/uL  Auto Lymphocyte # : 0.15 K/uL  Auto Monocyte # : 0.32 K/uL  Auto Eosinophil # : 0.00 K/uL  Auto Basophil # : 0.00 K/uL  Auto Neutrophil % : 88.6 %  Auto Lymphocyte % : 1.7 %  Auto Monocyte % : 3.5 %  Auto Eosinophil % : 0.0 %  Auto Basophil % : 0.0 %    04-06    137  |  101  |  65.0<H>  ----------------------------<  157<H>  4.6   |  22.0  |  1.74<H>    Ca    8.3<L>      06 Apr 2020 04:29  Phos  3.8     04-06  Mg     2.5     04-06    TPro  5.7<L>  /  Alb  2.7<L>  /  TBili  0.3<L>  /  DBili  x   /  AST  32  /  ALT  27  /  AlkPhos  35<L>  04-06    RECENT CULTURES:  04-02 .Blood XXXX XXXX   No growth at 48 hours    LIVER FUNCTIONS - ( 06 Apr 2020 04:29 )  Alb: 2.7 g/dL / Pro: 5.7 g/dL / ALK PHOS: 35 U/L / ALT: 27 U/L / AST: 32 U/L / GGT: x           CARDIAC MARKERS ( 06 Apr 2020 04:29 )  x     / 0.13 ng/mL / 383 U/L / x     / 6.4 ng/mL  CARDIAC MARKERS ( 06 Apr 2020 01:57 )  x     / 0.13 ng/mL / x     / x     / x      CARDIAC MARKERS ( 05 Apr 2020 15:26 )  x     / 0.22 ng/mL / x     / x     / x        ASSESSMENT/PLAN:  74yMale presenting with: COVID 19. Remains intubated in SCIU. s/p cardioversion for afib.on amio gtt  -sedation via fentanyl /propofol, intermittent pushes as necessary of meds according to pain/agitation/delirium  -hemodynamic support if needed with levophed  -continue mechanical ventilation, abg daily and prn  - improving MIKIE - then BMP, brown catheter, strict I/O's, avoid nephrotoxic agents if possible, monitor electrolyte disturbances fix accordingly   -continue tube feeds  -Monitor fever curve, leukocystosis, continue plaquenil   -scd/heparin subq (morbid obese dose).

## 2020-04-07 NOTE — CHART NOTE - NSCHARTNOTEFT_GEN_A_CORE
Patient's son Claudio contacted and apprised of all current conditions including slightly worsening hypoxia

## 2020-04-07 NOTE — PROGRESS NOTE ADULT - ATTENDING COMMENTS
75 yo M presents with ARDS due to COVID 19.  Intubated, vented, and sedated. open eyes, MUHAMMAD, does not follow command, GCS 8T. NSR on Amio gtt  Bardyacradic with heart block, continue Amio gtt and hep gtt.  PF ratio 133 and will prone.  Continue sedation via fentanyl /propofol  Continue mechanical ventilation for pulmonary support during proning  Continue Covid protocol --plaquenil   VTE ppx SCD/heparin   Continue ICU care for cardiopulmonary support and Covid therapy        code [id number]       CCT 50 min

## 2020-04-07 NOTE — PROGRESS NOTE ADULT - SUBJECTIVE AND OBJECTIVE BOX
INTERVAL HPI/OVERNIGHT EVEN:    [facility] with high [facility]EEP.  Insulin gtt with glycemic control.  Remains encephalopathic.  Hemodynamically stable.  Tolerating tube feeds.       MEDICATIONS  (STANDING):  ALBUTerol    90 MICROgram(s) HFA Inhaler 6 Puff(s) Inhalation every 4 hours  aMIOdarone Infusion 0.24 mG/Min (8 mL/Hr) IV Continuous <Continuous>  aspirin  chewable 81 milliGRAM(s) Oral daily  chlorhexidine 0.12% Liquid 15 milliLiter(s) Oral Mucosa every 12 hours  chlorhexidine 2% Cloths 1 Application(s) Topical daily  dextrose 5%. 1000 milliLiter(s) (50 mL/Hr) IV Continuous <Continuous>  dextrose 50% Injectable 12.5 Gram(s) IV Push once  dextrose 50% Injectable 25 Gram(s) IV Push once  dextrose 50% Injectable 25 Gram(s) IV Push once  heparin  Injectable 7500 Unit(s) SubCutaneous every 8 hours  HYDROmorphone Infusion 0.5 mG/Hr (0.5 mL/Hr) IV Continuous <Continuous>  insulin glargine Injectable (LANTUS) 40 Unit(s) SubCutaneous two times a day  insulin lispro (HumaLOG) corrective regimen sliding scale   SubCutaneous every 6 hours  melatonin 5 milliGRAM(s) Oral at bedtime  multiple electrolytes Injection Type 1 500 milliLiter(s) (500 mL/Hr) IV Continuous <Continuous>  propofol Infusion 10 MICROgram(s)/kG/Min (7.54 mL/Hr) IV Continuous <Continuous>    MEDICATIONS  (PRN):  acetaminophen   Tablet .. 650 milliGRAM(s) Oral every 6 hours PRN Temp greater or equal to 38C (100.4F), Mild Pain (1 - 3)  dextrose 40% Gel 15 Gram(s) Oral once PRN Blood Glucose LESS THAN 70 milliGRAM(s)/deciliter  glucagon  Injectable 1 milliGRAM(s) IntraMuscular once PRN Glucose LESS THAN 70 milligrams/deciliter  HYDROmorphone  Injectable 1 milliGRAM(s) IV Push every 3 hours PRN Breakthrough pain      Drug Dosing Weight  Height (cm): 172 (03 Apr 2020 05:00)  Weight (kg): 125.6 (03 Apr 2020 05:00)  BMI (kg/m2): 42.5 (03 Apr 2020 05:00)  BSA (m2): 2.34 (03 Apr 2020 05:00)      PAST MEDICAL & SURGICAL HISTORY:  Diabetes mellitus  Essential hypertension  No significant past surgical history      ICU Vital Signs Last 24 Hrs  T(C): 36.9 (07 Apr 2020 20:36), Max: 36.9 (07 Apr 2020 20:36)  T(F): 98.4 (07 Apr 2020 20:36), Max: 98.4 (07 Apr 2020 20:36)  HR: 73 (07 Apr 2020 23:00) (49 - 75)  BP: 117/65 (07 Apr 2020 23:00) (91/50 - 123/63)  BP(mean): 83 (07 Apr 2020 23:00) (65 - 88)  ABP: 105/54 (07 Apr 2020 23:00) (89/44 - 158/73)  ABP(mean): 70 (07 Apr 2020 23:00) (56 - 98)  RR: 24 (07 Apr 2020 23:00) (0 - 33)  SpO2: 94% (07 Apr 2020 23:00) (88% - 98%)      ABG - ( 07 Apr 2020 11:50 )  pH, Arterial: 7.39  pH, Blood: x     /  pCO2: 41    /  pO2: 83    / HCO3: 24    / Base Excess: -0.1  /  SaO2: 96                  I&O's Detail    06 Apr 2020 07:01  -  07 Apr 2020 07:00  --------------------------------------------------------  IN:    amiodarone Infusion: 168 mL    fentaNYL  Infusion: 525 mL    Glucerna 1.5: 600 mL    insulin regular Infusion: 160 mL    propofol Infusion: 225 mL  Total IN: 1678 mL    OUT:    Indwelling Catheter - Urethral: 1255 mL  Total OUT: 1255 mL    Total NET: 423 mL      07 Apr 2020 07:01  -  07 Apr 2020 23:45  --------------------------------------------------------  IN:    amiodarone Infusion: 96 mL    fentaNYL  Infusion: 200.8 mL    Glucerna 1.5: 600 mL    HYDROmorphone  Infusion: 1.5 mL    insulin regular Infusion: 30 mL    multiple electrolytes Injection Type 1: 500 mL    propofol Infusion: 166.1 mL    Solution: 500 mL  Total IN: 2094.4 mL    OUT:    Indwelling Catheter - Urethral: 670 mL  Total OUT: 670 mL    Total NET: 1424.4 mL          Mode: AC/ CMV (Assist Control/ Continuous Mandatory Ventilation)  RR (machine): 24  TV (machine): 0.55  FiO2: 40  PEEP: 15  ITime: 0.8  MAP: 22  PIP: 35      Physical Exam:    Neurological:  Non-focal.  Equal strength bilateral in all 4 extremities.  No appreciable sensory/motor deficits    HEENT: PERRLA, no drainage or redness.     Neck: Neck supple, No JVD    Respiratory:  Trachea midline, equal chest rise.  Breath Sounds equal bilateral    Cardiovascular: Regular rate & rhythm, normal S1, S2    Gastrointestinal: Soft, non-tender, normal bowel sounds    Extremities: No peripheral edema, No cyanosis, clubbing     Vascular: Equal and normal pulses: 2+ peripheral pulses throughout    Musculoskeletal: No joint pain, swelling or deformity; no limitation of movement    Skin: No rashes    LABS:  CBC Full  -  ( 07 Apr 2020 04:32 )  WBC Count : 7.21 K/uL  RBC Count : 3.75 M/uL  Hemoglobin : 11.3 g/dL  Hematocrit : 34.7 %  Platelet Count - Automated : 187 K/uL  Mean Cell Volume : 92.5 fl  Mean Cell Hemoglobin : 30.1 pg  Mean Cell Hemoglobin Concentration : 32.6 gm/dL  Auto Neutrophil # : 6.58 K/uL  Auto Lymphocyte # : 0.12 K/uL  Auto Monocyte # : 0.50 K/uL  Auto Eosinophil # : 0.00 K/uL  Auto Basophil # : 0.00 K/uL  Auto Neutrophil % : 87.0 %  Auto Lymphocyte % : 1.7 %  Auto Monocyte % : 7.0 %  Auto Eosinophil % : 0.0 %  Auto Basophil % : 0.0 %    04-07    138  |  102  |  73.0<H>  ----------------------------<  151<H>  4.3   |  22.0  |  1.76<H>    Ca    8.3<L>      07 Apr 2020 04:32  Phos  4.4     04-07  Mg     2.7     04-07    TPro  5.7<L>  /  Alb  2.7<L>  /  TBili  0.3<L>  /  DBili  x   /  AST  32  /  ALT  27  /  AlkPhos  35<L>  04-06          Assessment and Plan:

## 2020-04-08 NOTE — PROGRESS NOTE ADULT - SUBJECTIVE AND OBJECTIVE BOX
INTERVAL HPI/OVERNIGHT EVENTS/SUBJECTIVE:  Continues to require mech ventilation with high PEEP.  Insulin gtt discontinued, now on ISS.  Remains encephalopathic.  Hemodynamically stable.  Tolerating tube feeds.       ICU Vital Signs Last 24 Hrs  T(C): 36.9 (08 Apr 2020 12:00), Max: 37.1 (08 Apr 2020 01:00)  T(F): 98.5 (08 Apr 2020 12:00), Max: 98.8 (08 Apr 2020 01:00)  HR: 78 (08 Apr 2020 12:00) (54 - 78)  BP: 115/57 (08 Apr 2020 08:00) (91/55 - 123/63)  BP(mean): 80 (08 Apr 2020 08:00) (68 - 90)  ABP: 131/65 (08 Apr 2020 12:00) (92/47 - 141/57)  ABP(mean): 84 (08 Apr 2020 12:00) (61 - 84)  RR: 24 (08 Apr 2020 12:00) (19 - 24)  SpO2: 90% (08 Apr 2020 12:00) (90% - 96%)      I&O's Detail    07 Apr 2020 07:01  -  08 Apr 2020 07:00  --------------------------------------------------------  IN:    amiodarone Infusion: 192 mL    fentaNYL  Infusion: 200.8 mL    Glucerna 1.5: 600 mL    HYDROmorphone  Infusion: 8 mL    insulin regular Infusion: 30 mL    multiple electrolytes Injection Type 1: 500 mL    propofol Infusion: 243.1 mL    Solution: 500 mL  Total IN: 2273.9 mL    OUT:    Indwelling Catheter - Urethral: 1055 mL  Total OUT: 1055 mL    Total NET: 1218.9 mL    Mode: AC/ CMV (Assist Control/ Continuous Mandatory Ventilation)  RR (machine): 24  TV (machine): 550  FiO2: 40  PEEP: 15  ITime: 0.8  MAP: 24  PIP: 38    ABG - ( 08 Apr 2020 03:49 )  pH, Arterial: 7.41  pH, Blood: x     /  pCO2: 40    /  pO2: 78    / HCO3: 25    / Base Excess: 0.8   /  SaO2: 96        MEDICATIONS  (STANDING):  ALBUTerol    90 MICROgram(s) HFA Inhaler 6 Puff(s) Inhalation every 4 hours  aMIOdarone Infusion 0.24 mG/Min (8 mL/Hr) IV Continuous <Continuous>  aspirin  chewable 81 milliGRAM(s) Oral daily  chlorhexidine 0.12% Liquid 15 milliLiter(s) Oral Mucosa every 12 hours  chlorhexidine 2% Cloths 1 Application(s) Topical daily  dextrose 5%. 1000 milliLiter(s) (50 mL/Hr) IV Continuous <Continuous>  dextrose 50% Injectable 12.5 Gram(s) IV Push once  dextrose 50% Injectable 25 Gram(s) IV Push once  dextrose 50% Injectable 25 Gram(s) IV Push once  heparin  Injectable 7500 Unit(s) SubCutaneous every 8 hours  HYDROmorphone Infusion 0.5 mG/Hr (0.5 mL/Hr) IV Continuous <Continuous>  insulin glargine Injectable (LANTUS) 60 Unit(s) SubCutaneous at bedtime  insulin glargine Injectable (LANTUS) 40 Unit(s) SubCutaneous every morning  insulin lispro (HumaLOG) corrective regimen sliding scale   SubCutaneous every 4 hours  melatonin 5 milliGRAM(s) Oral at bedtime  multiple electrolytes Injection Type 1 500 milliLiter(s) (500 mL/Hr) IV Continuous <Continuous>  propofol Infusion 10 MICROgram(s)/kG/Min (7.54 mL/Hr) IV Continuous <Continuous>    MEDICATIONS  (PRN):  acetaminophen   Tablet .. 650 milliGRAM(s) Oral every 6 hours PRN Temp greater or equal to 38C (100.4F), Mild Pain (1 - 3)  dextrose 40% Gel 15 Gram(s) Oral once PRN Blood Glucose LESS THAN 70 milliGRAM(s)/deciliter  glucagon  Injectable 1 milliGRAM(s) IntraMuscular once PRN Glucose LESS THAN 70 milligrams/deciliter  HYDROmorphone  Injectable 1 milliGRAM(s) IV Push every 3 hours PRN Breakthrough pain    LABS:  CBC Full  -  ( 08 Apr 2020 04:04 )  WBC Count : 6.77 K/uL  RBC Count : 3.91 M/uL  Hemoglobin : 11.7 g/dL  Hematocrit : 36.0 %  Platelet Count - Automated : 195 K/uL  Mean Cell Volume : 92.1 fl  Mean Cell Hemoglobin : 29.9 pg  Mean Cell Hemoglobin Concentration : 32.5 gm/dL  Auto Neutrophil # : 5.29 K/uL  Auto Lymphocyte # : 0.56 K/uL  Auto Monocyte # : 0.71 K/uL  Auto Eosinophil # : 0.02 K/uL  Auto Basophil # : 0.01 K/uL  Auto Neutrophil % : 78.1 %  Auto Lymphocyte % : 8.3 %  Auto Monocyte % : 10.5 %  Auto Eosinophil % : 0.3 %  Auto Basophil % : 0.1 %    04-08    138  |  102  |  85.0<H>  ----------------------------<  244<H>  5.0   |  22.0  |  2.01<H>    Ca    8.5<L>      08 Apr 2020 04:04  Phos  4.5     04-08  Mg     2.7     04-08    TPro  5.3<L>  /  Alb  2.7<L>  /  TBili  0.3<L>  /  DBili  x   /  AST  29  /  ALT  33  /  AlkPhos  37<L>  04-08        RECENT CULTURES:  04-02 .Blood XXXX XXXX   No growth at 5 days.    LIVER FUNCTIONS - ( 08 Apr 2020 04:04 )  Alb: 2.7 g/dL / Pro: 5.3 g/dL / ALK PHOS: 37 U/L / ALT: 33 U/L / AST: 29 U/L / GGT: x           ASSESSMENT/PLAN:  74yMale presenting with: COVID 19. Remains intubated in SCIU. s/p cardioversion for afib.on amio gtt    Neuro: Encephalopathic, multifactorial.  Manage and optimize analgosedation.  Daily sedation holiday. Serial Neurologic assessments    CV: S/p cardioversion on amio gtt.  Continue invasive hemodynamic monitoring    Pulm: Mech vented.  Wean PEEP and FiO2 as tolerated.  Continue Pulmonary toilet.  Chest PT.  Prone if PF  <150    GI/Nutrition: Tube feeds. Bowel Regimen    /Renal: monitor UOP. Monitor BMP.  Replete Lytes as needed      HEME- DVT prophylaxis, SCDs    ID: Monitor fever curve, leukocytosis, procalcitonin     Endo:  Lantus bid with ISS.  Monitor glucose    Dispo:  SICU

## 2020-04-08 NOTE — PROGRESS NOTE ADULT - ATTENDING COMMENTS
75 yo M presents with ARDS due to COVID 19.  Intubated, vented, and sedated. open eyes, MUHAMMAD, does not follow command, GCS 8T. anca cardia improved and PF ratio 195  Continue Amio gtt and hep gtt.  PF ratio 195 and will unprone today to wean vent as tolerated  Continue sedation via fentanyl /propofol  BG > 250 and insulin gtt started yesterday. Start lantus 10/10 and high ISS to wean off insulin gtt  Continue Covid protocol --plaquenil   VTE ppx SCD/heparin   Continue ICU care for cardiopulmonary support and Covid therapy        code [id number]       CCT 50 min .

## 2020-04-09 NOTE — PROGRESS NOTE ADULT - SUBJECTIVE AND OBJECTIVE BOX
24 HOUR EVENTS: Continues to be sedated on propofol and dilaudid infusion.  P/f stable 200 on peep 15.  Tolerated tube feeds.  Remains off insulin gtt. UOP Adequate     SUBJECTIVE/ROS:  [ ] A ten-point review of systems was otherwise negative except as noted.  [x ] Due to altered mental status/intubation, subjective information were not able to be obtained from the patient. History was obtained, to the extent possible, from review of the chart and collateral sources of information.      NEURO  RASS:     GCS:     CAM ICU:  Exam: no focal neuro deficits, encehlopathic   Meds: acetaminophen   Tablet .. 650 milliGRAM(s) Oral every 6 hours PRN Temp greater or equal to 38C (100.4F), Mild Pain (1 - 3)  HYDROmorphone  Injectable 1 milliGRAM(s) IV Push every 3 hours PRN Breakthrough pain  HYDROmorphone Infusion 0.5 mG/Hr IV Continuous <Continuous>  melatonin 5 milliGRAM(s) Oral at bedtime  propofol Infusion 10 MICROgram(s)/kG/Min IV Continuous <Continuous>    [x] Adequacy of sedation and pain control has been assessed and adjusted      RESPIRATORY  RR: 24 (04-09-20 @ 12:00) (24 - 24)  SpO2: 97% (04-09-20 @ 12:00) (94% - 97%)  Wt(kg): --  Exam: unlabored, diminished bs b/l  Mechanical Ventilation: Mode: AC/ CMV (Assist Control/ Continuous Mandatory Ventilation), RR (machine): 24, RR (patient): 24, TV (machine): 550, FiO2: 40, PEEP: 15, ITime: 0.8, MAP: 22, PIP: 36  ABG - ( 09 Apr 2020 04:34 )  pH: 7.41  /  pCO2: 42    /  pO2: 89    / HCO3: 26    / Base Excess: 1.9   /  SaO2: 97      Lactate: x                [ ] Extubation Readiness Assessed  Meds: ALBUTerol    90 MICROgram(s) HFA Inhaler 6 Puff(s) Inhalation every 4 hours        CARDIOVASCULAR  HR: 74 (04-09-20 @ 12:00) (70 - 88)  BP: --  BP(mean): --  ABP: 100/53 (04-09-20 @ 12:00) (85/49 - 129/59)  ABP(mean): 68 (04-09-20 @ 12:00) (61 - 80)  Wt(kg): --  CVP(cm H2O): --      Exam: nsr  Cardiac Rhythm: rrr  Perfusion     [ ]Adequate   [ ]Inadequate  Mentation   [ ]Normal       [ ]Reduced  Extremities  [ ]Warm         [ ]Cool  Volume Status [ ]Hypervolemic [ ]Euvolemic [ ]Hypovolemic  Meds: aMIOdarone    Tablet 200 milliGRAM(s) Oral daily        GI/NUTRITION  Exam: soft, nttp, nd  Diet: tf at gaol   Meds:     GENITOURINARY  I&O's Detail    04-08 @ 07:01  -  04-09 @ 07:00  --------------------------------------------------------  IN:  Total IN: 0 mL    OUT:    Indwelling Catheter - Urethral: 1525 mL  Total OUT: 1525 mL    Total NET: -1525 mL          04-09    140  |  103  |  88.0<H>  ----------------------------<  278<H>  5.7<H>   |  23.0  |  1.70<H>    Ca    8.9      09 Apr 2020 04:55  Phos  5.1     04-09  Mg     3.0     04-09    TPro  6.0<L>  /  Alb  2.8<L>  /  TBili  0.3<L>  /  DBili  x   /  AST  23  /  ALT  30  /  AlkPhos  45  04-09    [ ] Benitez catheter, indication: N/A  Meds: dextrose 5%. 1000 milliLiter(s) IV Continuous <Continuous>  multiple electrolytes Injection Type 1 500 milliLiter(s) IV Continuous <Continuous>        HEMATOLOGIC  Meds: aspirin  chewable 81 milliGRAM(s) Oral daily  heparin  Injectable 7500 Unit(s) SubCutaneous every 8 hours    [x] VTE Prophylaxis                        11.6   8.38  )-----------( 191      ( 09 Apr 2020 04:55 )             35.9       Transfusion     [ ] PRBC   [ ] Platelets   [ ] FFP   [ ] Cryoprecipitate      INFECTIOUS DISEASES  T(C): 37.7 (04-09-20 @ 12:25), Max: 37.7 (04-09-20 @ 12:25)  Wt(kg): --  WBC Count: 8.38 K/uL (04-09 @ 04:55)  WBC Count: 8.00 K/uL (04-08 @ 17:49)    Recent Cultures:  Specimen Source: .Blood, 04-02 @ 15:27; Results   No growth at 5 days.; Gram Stain: --; Organism: --    Meds:       ENDOCRINE  Capillary Blood Glucose    Meds: dextrose 40% Gel 15 Gram(s) Oral once PRN  dextrose 50% Injectable 12.5 Gram(s) IV Push once  dextrose 50% Injectable 25 Gram(s) IV Push once  dextrose 50% Injectable 25 Gram(s) IV Push once  glucagon  Injectable 1 milliGRAM(s) IntraMuscular once PRN  insulin glargine Injectable (LANTUS) 60 Unit(s) SubCutaneous at bedtime  insulin glargine Injectable (LANTUS) 40 Unit(s) SubCutaneous every morning  insulin lispro (HumaLOG) corrective regimen sliding scale   SubCutaneous every 6 hours        ACCESS DEVICES:  [ ] Peripheral IV  [ ] Central Venous Line	[ ] R	[ ] L	[ ] IJ	[ ] Fem	[ ] SC	Placed:   [ ] Arterial Line		[ ] R	[ ] L	[ ] Fem	[ ] Rad	[ ] Ax	Placed:   [ ] PICC:					[ ] Mediport  [ ] Urinary Catheter, Date Placed:   [ ] Necessity of urinary, arterial, and venous catheters discussed    OTHER MEDICATIONS:  chlorhexidine 0.12% Liquid 15 milliLiter(s) Oral Mucosa every 12 hours  chlorhexidine 2% Cloths 1 Application(s) Topical daily      CODE STATUS:     IMAGING:

## 2020-04-09 NOTE — PROGRESS NOTE ADULT - ATTENDING COMMENTS
todays events: cont with current sedation . pulm pf slowly increases daily. will cont with current vent settings no need to prone today  cards: change to po amio. endo : insulin ggt converted to iss. pt overall is slowly improving.

## 2020-04-09 NOTE — CHART NOTE - NSCHARTNOTEFT_GEN_A_CORE
Source: Patient [ ]  Family [ ]   other [x ]    Current Diet: Diet, NPO with Tube Feed:   Tube Feeding Modality: Orogastric  Glucerna 1.5 Jatinder  Total Volume for 24 Hours (mL): 1200  Continuous  Starting Tube Feed Rate {mL per Hour}: 20     Every 4 hours  Until Goal Tube Feed Rate (mL per Hour): 50  Tube Feed Duration (in Hours): 24  Tube Feed Start Time: 05:45  No Carb Prosource (1pkg = 15gms Protein)     Qty per Day:  1 (04-03-20 @ 05:49)    Enteral /Parenteral Nutrition: Pt tolerating tube feeds per documentation. Regimen as ordered (x20 hrs) provides: 1030 ml, 1600 kcal, 98g protein, 759 ml free water.    Current Weight:   (4/9) 292.9 lbs  (4/7) 272.4 lbs  (4/4) 276.9 lbs  (4/3) 276.9 lbs  ? accuracy of weights, noted with 2+ generalized edema, continue to trend and maintain strict Is&Os     Pertinent Medications: MEDICATIONS  (STANDING):  ALBUTerol    90 MICROgram(s) HFA Inhaler 6 Puff(s) Inhalation every 4 hours  aMIOdarone Infusion 0.24 mG/Min (8 mL/Hr) IV Continuous <Continuous>  aspirin  chewable 81 milliGRAM(s) Oral daily  chlorhexidine 0.12% Liquid 15 milliLiter(s) Oral Mucosa every 12 hours  chlorhexidine 2% Cloths 1 Application(s) Topical daily  dextrose 5%. 1000 milliLiter(s) (50 mL/Hr) IV Continuous <Continuous>  dextrose 50% Injectable 12.5 Gram(s) IV Push once  dextrose 50% Injectable 25 Gram(s) IV Push once  dextrose 50% Injectable 25 Gram(s) IV Push once  heparin  Injectable 7500 Unit(s) SubCutaneous every 8 hours  HYDROmorphone Infusion 0.5 mG/Hr (0.5 mL/Hr) IV Continuous <Continuous>  insulin glargine Injectable (LANTUS) 60 Unit(s) SubCutaneous at bedtime  insulin glargine Injectable (LANTUS) 40 Unit(s) SubCutaneous every morning  insulin lispro (HumaLOG) corrective regimen sliding scale   SubCutaneous every 6 hours  melatonin 5 milliGRAM(s) Oral at bedtime  multiple electrolytes Injection Type 1 500 milliLiter(s) (500 mL/Hr) IV Continuous <Continuous>  propofol Infusion 10 MICROgram(s)/kG/Min (7.54 mL/Hr) IV Continuous <Continuous>    MEDICATIONS  (PRN):  acetaminophen   Tablet .. 650 milliGRAM(s) Oral every 6 hours PRN Temp greater or equal to 38C (100.4F), Mild Pain (1 - 3)  dextrose 40% Gel 15 Gram(s) Oral once PRN Blood Glucose LESS THAN 70 milliGRAM(s)/deciliter  glucagon  Injectable 1 milliGRAM(s) IntraMuscular once PRN Glucose LESS THAN 70 milligrams/deciliter  HYDROmorphone  Injectable 1 milliGRAM(s) IV Push every 3 hours PRN Breakthrough pain    Pertinent Labs: CBC Full  -  ( 09 Apr 2020 04:55 )  WBC Count : 8.38 K/uL  RBC Count : 3.86 M/uL  Hemoglobin : 11.6 g/dL  Hematocrit : 35.9 %  Platelet Count - Automated : 191 K/uL  Mean Cell Volume : 93.0 fl  Mean Cell Hemoglobin : 30.1 pg  Mean Cell Hemoglobin Concentration : 32.3 gm/dL  Auto Neutrophil # : 7.37 K/uL  Auto Lymphocyte # : 0.36 K/uL  Auto Monocyte # : 0.29 K/uL  Auto Eosinophil # : 0.00 K/uL  Auto Basophil # : 0.00 K/uL  Auto Neutrophil % : 87.0 %  Auto Lymphocyte % : 4.3 %  Auto Monocyte % : 3.5 %  Auto Eosinophil % : 0.0 %  Auto Basophil % : 0.0 %    04-09 Na140 mmol/L Glu 278 mg/dL<H> K+ 5.7 mmol/L<H> Cr  1.70 mg/dL<H> BUN 88.0 mg/dL<H> Phos 5.1 mg/dL<H> Alb 2.8 g/dL<L> PAB n/a      Skin: Moisture associated dermatitis per documentation     Nutrition focused physical exam not conducted at this time - found signs of malnutrition [ ]absent [ ]present    Subcutaneous fat loss: [ ] Orbital fat pads region, [ ]Buccal fat region, [ ]Triceps region,  [ ]Ribs region    Muscle wasting: [ ]Temples region, [ ]Clavicle region, [ ]Shoulder region, [ ]Scapula region, [ ]Interosseous region,  [ ]thigh region, [ ]Calf region    Estimated Needs:   [ x] no change since previous assessment  [ ] recalculated:     Current Nutrition Diagnosis: Pt remains at high nutrition risk secondary to increased nutrient needs (protein) related to increased physiological demand for nutrient as evidenced by hypoxia secondary to COVID-19 requiring intubation. Pt remains intubated/sedated. Tolerating tube feeds at goal per documentation. Insulin infusion now discontinued. Pt receiving Propofol which is providing an additional ~200 kcal.     Recommendations:   1) Glucerna 1.2: goal rate of 55 ml/hr (x20 hrs) to provide 1100 ml, 1320 kcal, 66g protein, 886 ml free water, and >100% of RDIs for vitamins/minerals. Increase Prostat to BID to provide an additional 200 kcal and 30g protein.   2) Rx: liquid MVI daily.  3) Monitor tube feed tolerance.  4) Obtain daily weights as feasible to monitor trends.     Monitoring and Evaluation:   [ ] PO intake [x ] Tolerance to diet prescription [X] Weights  [X] Follow up per protocol [X] Labs

## 2020-04-10 NOTE — PROGRESS NOTE ADULT - SUBJECTIVE AND OBJECTIVE BOX
HISTORY  74y Male    24 HOUR EVENTS: Patient remains status quo, no major changes, did well overnight    SUBJECTIVE/ROS:  [ ] A ten-point review of systems was otherwise negative except as noted.  [x ] Due to altered mental status/intubation, subjective information were not able to be obtained from the patient. History was obtained, to the extent possible, from review of the chart and collateral sources of information.      NEURO  RASS:     GCS:     CAM ICU:  Exam: no focal deficits , encephlopathic   Meds: acetaminophen   Tablet .. 650 milliGRAM(s) Oral every 6 hours PRN Temp greater or equal to 38C (100.4F), Mild Pain (1 - 3)  HYDROmorphone  Injectable 1 milliGRAM(s) IV Push every 3 hours PRN Breakthrough pain  HYDROmorphone Infusion 0.5 mG/Hr IV Continuous <Continuous>  melatonin 5 milliGRAM(s) Oral at bedtime  propofol Infusion 10 MICROgram(s)/kG/Min IV Continuous <Continuous>    [x] Adequacy of sedation and pain control has been assessed and adjusted      RESPIRATORY  RR: 24 (04-10-20 @ 00:00) (24 - 24)  SpO2: 97% (04-10-20 @ 00:52) (95% - 97%)  Wt(kg): --  Exam: unlabored, diminished at bases   Mechanical Ventilation: Mode: AC/ CMV (Assist Control/ Continuous Mandatory Ventilation), RR (machine): 24, RR (patient): 24, TV (machine): 550, FiO2: 40, PEEP: 15, ITime: 0.8, MAP: 22, PIP: 35  ABG - ( 09 Apr 2020 04:34 )  pH: 7.41  /  pCO2: 42    /  pO2: 89    / HCO3: 26    / Base Excess: 1.9   /  SaO2: 97      Lactate: x                [x ] Extubation Readiness Assessed  Meds: ALBUTerol    90 MICROgram(s) HFA Inhaler 6 Puff(s) Inhalation every 4 hours        CARDIOVASCULAR  HR: 82 (04-10-20 @ 00:52) (70 - 90)  BP: --  BP(mean): --  ABP: 148/58 (04-10-20 @ 00:00) (85/49 - 165/70)  ABP(mean): 81 (04-10-20 @ 00:00) (61 - 99)  Wt(kg): --  CVP(cm H2O): --      Exam:  Cardiac Rhythm:  Perfusion     [x ]Adequate   [ ]Inadequate  Mentation   [ ]Normal       [x ]Reduced  Extremities  [x ]Warm         [ ]Cool  Volume Status [ ]Hypervolemic [x ]Euvolemic [ ]Hypovolemic  Meds: aMIOdarone    Tablet 200 milliGRAM(s) Oral daily        GI/NUTRITION  Exam: softly distended, obese   Diet: tube feeds at goal   Meds:     GENITOURINARY  I&O's Detail    04-08 @ 07:01  -  04-09 @ 07:00  --------------------------------------------------------  IN:  Total IN: 0 mL    OUT:    Indwelling Catheter - Urethral: 1525 mL  Total OUT: 1525 mL    Total NET: -1525 mL      04-09 @ 07:01  -  04-10 @ 03:11  --------------------------------------------------------  IN:  Total IN: 0 mL    OUT:    Indwelling Catheter - Urethral: 1200 mL  Total OUT: 1200 mL    Total NET: -1200 mL          04-09    140  |  103  |  88.0<H>  ----------------------------<  278<H>  5.7<H>   |  23.0  |  1.70<H>    Ca    8.9      09 Apr 2020 04:55  Phos  5.1     04-09  Mg     3.0     04-09    TPro  6.0<L>  /  Alb  2.8<L>  /  TBili  0.3<L>  /  DBili  x   /  AST  23  /  ALT  30  /  AlkPhos  45  04-09    [x ] Benitez catheter, indication: N/A  Meds: dextrose 5%. 1000 milliLiter(s) IV Continuous <Continuous>  multiple electrolytes Injection Type 1 500 milliLiter(s) IV Continuous <Continuous>        HEMATOLOGIC  Meds: aspirin  chewable 81 milliGRAM(s) Oral daily  heparin  Injectable 7500 Unit(s) SubCutaneous every 8 hours    [x] VTE Prophylaxis                        11.6   8.38  )-----------( 191      ( 09 Apr 2020 04:55 )             35.9       Transfusion     [ ] PRBC   [ ] Platelets   [ ] FFP   [ ] Cryoprecipitate      INFECTIOUS DISEASES  T(C): 37.6 (04-10-20 @ 00:00), Max: 37.7 (04-09-20 @ 12:25)  Wt(kg): --  WBC Count: 8.38 K/uL (04-09 @ 04:55)    Recent Cultures:    Meds:       ENDOCRINE  Capillary Blood Glucose    Meds: dextrose 40% Gel 15 Gram(s) Oral once PRN  dextrose 50% Injectable 12.5 Gram(s) IV Push once  dextrose 50% Injectable 25 Gram(s) IV Push once  dextrose 50% Injectable 25 Gram(s) IV Push once  glucagon  Injectable 1 milliGRAM(s) IntraMuscular once PRN  insulin glargine Injectable (LANTUS) 60 Unit(s) SubCutaneous at bedtime  insulin glargine Injectable (LANTUS) 40 Unit(s) SubCutaneous every morning  insulin lispro (HumaLOG) corrective regimen sliding scale   SubCutaneous every 6 hours        ACCESS DEVICES:  [ ] Peripheral IV  [ ] Central Venous Line	[ ] R	[ ] L	[ ] IJ	[ ] Fem	[ ] SC	Placed:   [ ] Arterial Line		[ ] R	[ ] L	[ ] Fem	[ ] Rad	[ ] Ax	Placed:   [ ] PICC:					[ ] Mediport  [ ] Urinary Catheter, Date Placed:   [ x] Necessity of urinary, arterial, and venous catheters discussed    OTHER MEDICATIONS:  chlorhexidine 0.12% Liquid 15 milliLiter(s) Oral Mucosa every 12 hours  chlorhexidine 2% Cloths 1 Application(s) Topical daily      CODE STATUS:     IMAGING:

## 2020-04-10 NOTE — PROGRESS NOTE ADULT - ATTENDING COMMENTS
73 yo M presents with ARDS due to COVID 19.  Intubated, vented, and sedated. open eyes, MUHAMMAD, does not follow command, GCS 8T. anca cardia improved and PF ratio  222. L CT with small intermittent leak.  Continue Amio gtt and hep sc.  PF ratio 222 and will wean vent as tolerated  Continue sedation via fentanyl / hold propofol and allow to wake to check neuro status  Continue to control BG  with lantus and high ISS   Continue Covid protocol --plaquenil   VTE ppx SCD/heparin   Continue ICU care for cardiopulmonary support and Covid therapy        code [id number]       CCT 50 min .

## 2020-04-10 NOTE — PROGRESS NOTE ADULT - PROBLEM SELECTOR PLAN 1
continue sedation with propofol/dilaudid gtt, prn doses of dilaudid for agitation/discomfort, melatonin for sleep regulation   continue to wean ventilator support as tolerated , albuterol mdi, daily abg and prn   hemodynamic support PRN, continue asa   continue tube feeds at goal  insulin sliding scale with lantus for glucose control  brown continued , trend bun and creatinine, fluid boluses as needed, monitor electrolytes, replace as needed   dvt ppx with heparin subq (dosed for bmo>30)

## 2020-04-12 NOTE — CHART NOTE - NSCHARTNOTEFT_GEN_A_CORE
Spoke to patient's son Margarito to provide an update on patient's current condition. All questions were answered and concerns were addressed.

## 2020-04-12 NOTE — PROGRESS NOTE ADULT - ATTENDING COMMENTS
todays events: tm 101F. cont with same sedation . respiratory ; cont to wean peep to 10 today. pf 315, goal to be able to be on PS/cpap within next 24-48 hours is continues to improve. add po klonidine , zyprexa to aid in lowering iv sedation.

## 2020-04-12 NOTE — PROGRESS NOTE ADULT - SUBJECTIVE AND OBJECTIVE BOX
24h Events: agitated and tachypneic with sedation vacation early this morning, TMAX 101 overnight. still with soft pressures (MAPS approx 60s), started on small dose of levo overnight. Oxygenation improved, P:F 315 this morning.     ICU Vital Signs Last 24 Hrs  T(C): 38.2 (12 Apr 2020 12:30), Max: 38.3 (11 Apr 2020 23:43)  T(F): 100.8 (12 Apr 2020 12:30), Max: 101 (11 Apr 2020 23:43)  HR: 82 (12 Apr 2020 12:00) (77 - 89)  BP: --  BP(mean): --  ABP: 99/48 (12 Apr 2020 12:00) (97/45 - 138/56)  ABP(mean): 65 (12 Apr 2020 12:00) (60 - 80)  RR: 24 (12 Apr 2020 12:00) (24 - 24)  SpO2: 95% (12 Apr 2020 12:00) (91% - 95%)    I&O's Detail    11 Apr 2020 07:01  -  12 Apr 2020 07:00  --------------------------------------------------------  IN:    Enteral Tube Flush: 200 mL    Glucerna 1.5: 600 mL    heparin  Infusion.: 161 mL    HYDROmorphone  Infusion: 7.5 mL    multiple electrolytes Injection Type 1: 600 mL    norepinephrine Infusion: 12.5 mL    propofol Infusion: 105 mL  Total IN: 1686 mL    OUT:  Indwelling Catheter - Urethral: 2375 mL  Total OUT: 2375 mL    Total NET: -689 mL    ABG - ( 12 Apr 2020 05:35 )  pH, Arterial: 7.42  pH, Blood: x     /  pCO2: 44    /  pO2: 126   / HCO3: 28    / Base Excess: 3.7   /  SaO2: 99        MEDICATIONS  (STANDING):  ALBUTerol    90 MICROgram(s) HFA Inhaler 6 Puff(s) Inhalation every 4 hours  aMIOdarone    Tablet 200 milliGRAM(s) Oral daily  aspirin  chewable 81 milliGRAM(s) Oral daily  chlorhexidine 0.12% Liquid 15 milliLiter(s) Oral Mucosa every 12 hours  chlorhexidine 2% Cloths 1 Application(s) Topical daily  clonazePAM Oral Disintegrating Tablet 0.5 milliGRAM(s) Oral every 12 hours  dextrose 5%. 1000 milliLiter(s) (50 mL/Hr) IV Continuous <Continuous>  dextrose 50% Injectable 12.5 Gram(s) IV Push once  dextrose 50% Injectable 25 Gram(s) IV Push once  dextrose 50% Injectable 25 Gram(s) IV Push once  heparin  Infusion.  Unit(s)/Hr (23 mL/Hr) IV Continuous <Continuous>  HYDROmorphone Infusion 0.5 mG/Hr (0.5 mL/Hr) IV Continuous <Continuous>  insulin glargine Injectable (LANTUS) 60 Unit(s) SubCutaneous at bedtime  insulin glargine Injectable (LANTUS) 40 Unit(s) SubCutaneous every morning  insulin lispro (HumaLOG) corrective regimen sliding scale   SubCutaneous every 6 hours  multiple electrolytes Injection Type 1 1000 milliLiter(s) (50 mL/Hr) IV Continuous <Continuous>  norepinephrine Infusion 0.05 MICROgram(s)/kG/Min (11.8 mL/Hr) IV Continuous <Continuous>  pantoprazole  Injectable 40 milliGRAM(s) IV Push daily  propofol Infusion 10 MICROgram(s)/kG/Min (7.54 mL/Hr) IV Continuous <Continuous>    MEDICATIONS  (PRN):  acetaminophen   Tablet .. 650 milliGRAM(s) Oral every 6 hours PRN Temp greater or equal to 38C (100.4F), Mild Pain (1 - 3)  dextrose 40% Gel 15 Gram(s) Oral once PRN Blood Glucose LESS THAN 70 milliGRAM(s)/deciliter  glucagon  Injectable 1 milliGRAM(s) IntraMuscular once PRN Glucose LESS THAN 70 milligrams/deciliter  heparin  Injectable 38893 Unit(s) IV Push every 6 hours PRN For aPTT less than 40  heparin  Injectable 5000 Unit(s) IV Push every 6 hours PRN For aPTT between 40 - 57    Physical Exam:  Neuro: sedated RASS -3    Pulm: intubated, breath sounds B/L, moder amount of secretions    CV: NS/A fib. Rate controlled     Gastrointestinal: soft, non-tender, non-distended, no rebound / guarding    : brown in place draining yellow urine    Skin: warm, dry, no diaphoresis, no pallor, cyanosis, or jaundice    LABS:  CBC Full  -  ( 12 Apr 2020 06:21 )  WBC Count : 7.23 K/uL  RBC Count : 3.31 M/uL  Hemoglobin : 9.9 g/dL  Hematocrit : 32.2 %  Platelet Count - Automated : 135 K/uL  Mean Cell Volume : 97.3 fl  Mean Cell Hemoglobin : 29.9 pg  Mean Cell Hemoglobin Concentration : 30.7 gm/dL  Auto Neutrophil # : 5.88 K/uL  Auto Lymphocyte # : 0.76 K/uL  Auto Monocyte # : 0.53 K/uL  Auto Eosinophil # : 0.02 K/uL  Auto Basophil # : 0.01 K/uL  Auto Neutrophil % : 81.4 %  Auto Lymphocyte % : 10.5 %  Auto Monocyte % : 7.3 %  Auto Eosinophil % : 0.3 %  Auto Basophil % : 0.1 %    04-12    149<H>  |  111<H>  |  80.0<H>  ----------------------------<  231<H>  5.0   |  27.0  |  1.79<H>    Ca    8.0<L>      12 Apr 2020 06:21  Phos  3.8     04-12  Mg     3.3     04-12    PTT - ( 12 Apr 2020 06:22 )  PTT:>200.0 sec      ASSESSMENT/PLAN:  74y male former smoker with PMHx of DM2, HTN and sleep apnea brought to hospital by EMS after found down by friend for uknown amount of time. Found to have COVID-19, and admitted in the ED for hypoxic respiratory failure. Ventilator day 10.    Neurological: propofol & dilaudid gtt for sedation. Add Klonopin 0.5 BID for hypoactive delirium in order to wean propofol    Pulmonary: continue mechanical ventilation with AC on 40% FIO2, wean PEEP from 12 to 10, continue albuterol inhaler    Cardiovascular: rate controlled, still occasionally with A fib. Continue amio 200 mg PO, wean levo as tolerated to maintain map approx 65    GI/Nutrition: NPO with tube feeds     /Renal: kevin for strict I&O, monitor kidney fxn    ID: s/p course of Plaquenil     Endo: monitor blood glucose q6, ISS and Lantus    Skin: repositioning for DTI prevention while in bed    Heme/DVT Prophylaxis: heparin ggt

## 2020-04-13 NOTE — CHART NOTE - NSCHARTNOTEFT_GEN_A_CORE
Spoke to patient's son Claudio, daily update was given.  All questions were answered and all concerns were addressed.

## 2020-04-13 NOTE — PROGRESS NOTE ADULT - SUBJECTIVE AND OBJECTIVE BOX
24h Events: weaned off levophed, still heavily sedated unable to tolerate sedation vacation.    ICU Vital Signs Last 24 Hrs  T(C): 36.9 (13 Apr 2020 12:03), Max: 38.5 (13 Apr 2020 03:52)  T(F): 98.5 (13 Apr 2020 12:03), Max: 101.3 (13 Apr 2020 03:52)  HR: 74 (13 Apr 2020 12:00) (74 - 90)  BP: --  BP(mean): --  ABP: 103/50 (13 Apr 2020 12:00) (87/46 - 153/55)  ABP(mean): 67 (13 Apr 2020 12:00) (60 - 87)  RR: 24 (13 Apr 2020 12:00) (24 - 30)  SpO2: 95% (13 Apr 2020 12:00) (91% - 95%)    I&O's Detail    12 Apr 2020 07:01  -  13 Apr 2020 07:00  --------------------------------------------------------  IN:    heparin  Infusion.: 124 mL    HYDROmorphone  Infusion: 4.5 mL    multiple electrolytes Injection Type 1multiple electrolytes Injection Type 1: 450 mL    propofol Infusion: 34.2 mL  Total IN: 612.7 mL  OUT:    Indwelling Catheter - Urethral: 3150 mL  Total OUT: 3150 mL    Total NET: -2537.3 mL    13 Apr 2020 07:01  -  13 Apr 2020 13:57  --------------------------------------------------------  IN:    Enteral Tube Flush: 300 mL    Glucerna 1.5: 350 mL    heparin  Infusion.: 84 mL    HYDROmorphone  Infusion: 3.5 mL    lactated ringers.: 50 mL    multiple electrolytes Injection Type 1multiple electrolytes Injection Type 1: 250 mL    propofol Infusion: 11.4 mL  Total IN: 1048.9 mL    OUT:    Indwelling Catheter - Urethral: 900 mL  Total OUT: 900 mL    Total NET: 148.9 mL    ABG - ( 13 Apr 2020 04:50 )  pH, Arterial: 7.46  pH, Blood: x     /  pCO2: 42    /  pO2: 70    / HCO3: 30    / Base Excess: 5.7   /  SaO2: 95       MEDICATIONS  (STANDING):  ALBUTerol    90 MICROgram(s) HFA Inhaler 6 Puff(s) Inhalation every 4 hours  aMIOdarone    Tablet 200 milliGRAM(s) Oral daily  aspirin  chewable 81 milliGRAM(s) Oral daily  chlorhexidine 0.12% Liquid 15 milliLiter(s) Oral Mucosa every 12 hours  chlorhexidine 2% Cloths 1 Application(s) Topical daily  clonazePAM  Tablet 0.5 milliGRAM(s) Oral every 12 hours  dexMEDEtomidine Infusion 0.2 MICROgram(s)/kG/Hr (6.28 mL/Hr) IV Continuous <Continuous>  heparin  Infusion.  Unit(s)/Hr (23 mL/Hr) IV Continuous <Continuous>  HYDROmorphone Infusion 0.5 mG/Hr (0.5 mL/Hr) IV Continuous <Continuous>  insulin glargine Injectable (LANTUS) 60 Unit(s) SubCutaneous at bedtime  insulin glargine Injectable (LANTUS) 40 Unit(s) SubCutaneous every morning  insulin lispro (HumaLOG) corrective regimen sliding scale   SubCutaneous every 6 hours  lactated ringers. 1000 milliLiter(s) (50 mL/Hr) IV Continuous <Continuous>  pantoprazole  Injectable 40 milliGRAM(s) IV Push daily    MEDICATIONS  (PRN):  acetaminophen   Tablet .. 650 milliGRAM(s) Oral every 6 hours PRN Temp greater or equal to 38C (100.4F), Mild Pain (1 - 3)  heparin  Injectable 44728 Unit(s) IV Push every 6 hours PRN For aPTT less than 40  heparin  Injectable 5000 Unit(s) IV Push every 6 hours PRN For aPTT between 40 - 57    Physical Exam:    Neurological: heavily sedated, RASS -4    Pulmonary: mechanical ventilation; breath sounds b/l    Cardiovascular: A. fib, adequately rate controlled    Gastrointestinal: soft, non-tender, non-distended, no rebound / guarding    : brown in place draining yellow urine    Skin: warm, dry, no diaphoresis, no pallor, cyanosis, or jaundice    LABS:  CBC Full  -  ( 13 Apr 2020 06:17 )  WBC Count : 8.47 K/uL  RBC Count : 3.53 M/uL  Hemoglobin : 10.5 g/dL  Hematocrit : 34.3 %  Platelet Count - Automated : 145 K/uL  Mean Cell Volume : 97.2 fl  Mean Cell Hemoglobin : 29.7 pg  Mean Cell Hemoglobin Concentration : 30.6 gm/dL  Auto Neutrophil # : 6.71 K/uL  Auto Lymphocyte # : 1.07 K/uL  Auto Monocyte # : 0.60 K/uL  Auto Eosinophil # : 0.02 K/uL  Auto Basophil # : 0.02 K/uL  Auto Neutrophil % : 79.3 %  Auto Lymphocyte % : 12.6 %  Auto Monocyte % : 7.1 %  Auto Eosinophil % : 0.2 %  Auto Basophil % : 0.2 %    04-13    154<H>  |  115<H>  |  73.0<H>  ----------------------------<  164<H>  4.8   |  26.0  |  1.70<H>    Ca    8.2<L>      13 Apr 2020 06:17  Phos  3.4     04-13  Mg     3.3     04-13    PT/INR - ( 13 Apr 2020 06:17 )   PT: 17.4 sec;   INR: 1.52 ratio      PTT - ( 13 Apr 2020 09:56 )  PTT:68.8 sec    RECENT CULTURES:    CARDIAC MARKERS ( 13 Apr 2020 06:17 )  x     / 0.11 ng/mL / 397 U/L / x     / <1.0 ng/mL      ASSESSMENT/PLAN:  74y male former smoker with PMHx of DM2, HTN and sleep apnea brought to hospital by EMS after found down by friend for uknown amount of time. Found to have COVID-19, and admitted in the ED for hypoxic respiratory failure. Ventilator day 10.    Neuro: discontinue propofol, start precedex, delirium precautions, continue Klonopin daily sedation holiday    CV: continue invasive monitoring with arterial line, continue amiodarone    Pulm: wean FiO2 / PEEP as tolerated, oxygenating adequately on 40% FIO2, trial pressure support if mental status improves    GI/Nutrition: NPO with tube feeds - hold feeds if prone    /Renal: brown for strict I&O, monitor kidney fxn, worsening hypernatremia, free water increased, continue gentle hydration with LR @ 50    ID: s/p plaquenil     Endo: blood glucose monitoring q 6, continue lantus and ISS    Skin: repositioning for DTI prevention while in bed    Heme/DVT Prophylaxis: heparin ggt    Dispo: care per SICU

## 2020-04-14 NOTE — PROGRESS NOTE ADULT - SUBJECTIVE AND OBJECTIVE BOX
24h Events:  hypertensive overnight - systolic 210's - responded to hydralazine 20mg  mucus plug  - tachycardic - responded to lopressor  tmax 103    ICU Vital Signs Last 24 Hrs  T(C): 39.6 (14 Apr 2020 00:13), Max: 39.7 (14 Apr 2020 00:00)  T(F): 103.2 (14 Apr 2020 00:13), Max: 103.5 (14 Apr 2020 00:00)  HR: 105 (14 Apr 2020 02:00) (73 - 126)  BP: 115/63 (14 Apr 2020 02:00) (115/63 - 198/84)  BP(mean): 81 (14 Apr 2020 02:00) (81 - 120)  ABP: 103/45 (14 Apr 2020 02:00) (103/45 - 224/64)  ABP(mean): 62 (14 Apr 2020 02:00) (62 - 105)  RR: 24 (14 Apr 2020 02:00) (24 - 32)  SpO2: 95% (14 Apr 2020 02:00) (90% - 98%)      I&O's Detail    12 Apr 2020 07:01  -  13 Apr 2020 07:00  --------------------------------------------------------  IN:    heparin  Infusion.: 124 mL    HYDROmorphone  Infusion: 4.5 mL    multiple electrolytes Injection Type 1multiple electrolytes Injection Type 1: 450 mL    propofol Infusion: 34.2 mL  Total IN: 612.7 mL    OUT:    Indwelling Catheter - Urethral: 3150 mL  Total OUT: 3150 mL    Total NET: -2537.3 mL      13 Apr 2020 07:01  -  14 Apr 2020 02:37  --------------------------------------------------------  IN:    dexmedetomidine Infusion: 40.8 mL    Enteral Tube Flush: 600 mL    Glucerna 1.5: 850 mL    heparin  Infusion.: 216 mL    HYDROmorphone  Infusion: 10 mL    Lactated Ringers IV Bolus: 500 mL    lactated ringers.: 650 mL    multiple electrolytes Injection Type 1multiple electrolytes Injection Type 1: 250 mL    propofol Infusion: 11.4 mL  Total IN: 3128.2 mL    OUT:    Indwelling Catheter - Urethral: 1900 mL  Total OUT: 1900 mL    Total NET: 1228.2 mL          ABG - ( 13 Apr 2020 04:50 )  pH, Arterial: 7.46  pH, Blood: x     /  pCO2: 42    /  pO2: 70    / HCO3: 30    / Base Excess: 5.7   /  SaO2: 95                  MEDICATIONS  (STANDING):  ALBUTerol    90 MICROgram(s) HFA Inhaler 6 Puff(s) Inhalation every 4 hours  aMIOdarone    Tablet 200 milliGRAM(s) Oral daily  aspirin  chewable 81 milliGRAM(s) Oral daily  chlorhexidine 0.12% Liquid 15 milliLiter(s) Oral Mucosa every 12 hours  chlorhexidine 2% Cloths 1 Application(s) Topical daily  clonazePAM  Tablet 0.5 milliGRAM(s) Oral every 12 hours  dexMEDEtomidine Infusion 0.2 MICROgram(s)/kG/Hr (6.28 mL/Hr) IV Continuous <Continuous>  heparin  Infusion.  Unit(s)/Hr (23 mL/Hr) IV Continuous <Continuous>  HYDROmorphone Infusion 0.5 mG/Hr (0.5 mL/Hr) IV Continuous <Continuous>  insulin glargine Injectable (LANTUS) 60 Unit(s) SubCutaneous at bedtime  insulin glargine Injectable (LANTUS) 40 Unit(s) SubCutaneous every morning  insulin lispro (HumaLOG) corrective regimen sliding scale   SubCutaneous every 6 hours  lactated ringers Bolus 500 milliLiter(s) IV Bolus once  lactated ringers. 1000 milliLiter(s) (50 mL/Hr) IV Continuous <Continuous>  pantoprazole  Injectable 40 milliGRAM(s) IV Push daily    MEDICATIONS  (PRN):  acetaminophen   Tablet .. 650 milliGRAM(s) Oral every 6 hours PRN Temp greater or equal to 38C (100.4F), Mild Pain (1 - 3)  heparin  Injectable 91186 Unit(s) IV Push every 6 hours PRN For aPTT less than 40  heparin  Injectable 5000 Unit(s) IV Push every 6 hours PRN For aPTT between 40 - 57  hydrALAZINE Injectable 10 milliGRAM(s) IV Push every 4 hours PRN systolic > 180        Physical Exam:    Neurological: precedex and dilaudid gtt for sedation / analgesia    Pulmonary: mechanical ventilation; AC 24/550/40%/10    Cardiovascular: S1, S2, regular rate & rhythm    Gastrointestinal: soft, non-tender, non-distended, no rebound / guarding    : brown in place draining yellow urine    Skin: warm, dry, no diaphoresis, no pallor, cyanosis, or jaundice    LABS:  CBC Full  -  ( 13 Apr 2020 06:17 )  WBC Count : 8.47 K/uL  RBC Count : 3.53 M/uL  Hemoglobin : 10.5 g/dL  Hematocrit : 34.3 %  Platelet Count - Automated : 145 K/uL  Mean Cell Volume : 97.2 fl  Mean Cell Hemoglobin : 29.7 pg  Mean Cell Hemoglobin Concentration : 30.6 gm/dL  Auto Neutrophil # : 6.71 K/uL  Auto Lymphocyte # : 1.07 K/uL  Auto Monocyte # : 0.60 K/uL  Auto Eosinophil # : 0.02 K/uL  Auto Basophil # : 0.02 K/uL  Auto Neutrophil % : 79.3 %  Auto Lymphocyte % : 12.6 %  Auto Monocyte % : 7.1 %  Auto Eosinophil % : 0.2 %  Auto Basophil % : 0.2 %    04-13    154<H>  |  115<H>  |  73.0<H>  ----------------------------<  164<H>  4.8   |  26.0  |  1.70<H>    Ca    8.2<L>      13 Apr 2020 06:17  Phos  3.4     04-13  Mg     3.3     04-13      PT/INR - ( 13 Apr 2020 06:17 )   PT: 17.4 sec;   INR: 1.52 ratio         PTT - ( 13 Apr 2020 09:56 )  PTT:68.8 sec    RECENT CULTURES:        CARDIAC MARKERS ( 13 Apr 2020 06:17 )  x     / 0.11 ng/mL / 397 U/L / x     / <1.0 ng/mL          ASSESSMENT/PLAN:  74y Male COVID + .     Neuro: sedation / analgesia with precedex, dilaudid, delirium precautions, daily sedation holiday    CV: continue invasive monitoring with arterial line    Pulm: wean FiO2 / PEEP aas tolerated    GI/Nutrition: NPO with tube feeds     /Renal: brown for strict I&O, monitor kidney fxn    ID: tmax 103 - f/u procalcitonin    Endo: monitor blood glucose    Skin: repositioning for DTI prevention while in bed    Heme/DVT Prophylaxis: SCDs , hep gtt

## 2020-04-14 NOTE — PROGRESS NOTE ADULT - ATTENDING COMMENTS
The patient was seen and examined  Events noted  No new problems    Neurologic:  Sedated  Respiratory:  AC 50%, PEEP +10; EFY=803  Hemodynamic:  Normal  Renal:  Urine flow okay  GI:  TEN  Hematologic:  Hgb okay  ID:  Febrile, procalcitonin elevated, leukocytosis    Plan:  Remove IJ line  Place new CVL  Obtain Blood culture  Adjust sedation to allow patient interaction  AC

## 2020-04-14 NOTE — CHART NOTE - NSCHARTNOTEFT_GEN_A_CORE
Source: Patient [ ]  Family [ ]   other [ x]    Current Diet:   Diet, NPO with Tube Feed:   Tube Feeding Modality: Orogastric  Glucerna 1.5 Jatinder  Total Volume for 24 Hours (mL): 1200  Continuous  Starting Tube Feed Rate {mL per Hour}: 20     Every 4 hours  Until Goal Tube Feed Rate (mL per Hour): 50  Tube Feed Duration (in Hours): 24  Tube Feed Start Time: 05:45  No Carb Prosource (1pkg = 15gms Protein)     Qty per Day:  1 (04-03-20 @ 05:49)    Enteral /Parenteral Nutrition: Glucerna 1.5, goal rate of 50 ml/hr (x20 hr) + Prostat 30 ml once daily provides: 1030 ml, 1600 kcal, 98g protein, 759 ml free water, and 100% of RDIs for vitamins/minerals. Receiving 300 ml free water q6 hrs.     Current Weight:   (4/14) 292.7 lbs  (4/13) 290.3 lbs  (4/12) 294.9 lbs  (4/11) 300.9 lbs  (4/10) 303.3 lbs  (4/9) 292.9 lbs  (4/7) 272.4 lbs  (4/4) 276.9 lbs  (4/3) 276.9 lbs  ? accuracy of weights, noted with 3+ generalized edema, continue to trend and maintain strict Is&Os     Pertinent Medications: MEDICATIONS  (STANDING):  ALBUTerol    90 MICROgram(s) HFA Inhaler 6 Puff(s) Inhalation every 4 hours  aMIOdarone    Tablet 200 milliGRAM(s) Oral daily  aspirin  chewable 81 milliGRAM(s) Oral daily  chlorhexidine 0.12% Liquid 15 milliLiter(s) Oral Mucosa every 12 hours  chlorhexidine 2% Cloths 1 Application(s) Topical daily  clonazePAM  Tablet 0.5 milliGRAM(s) Oral every 12 hours  dexMEDEtomidine Infusion 0.2 MICROgram(s)/kG/Hr (6.28 mL/Hr) IV Continuous <Continuous>  heparin  Infusion.  Unit(s)/Hr (15 mL/Hr) IV Continuous <Continuous>  insulin glargine Injectable (LANTUS) 60 Unit(s) SubCutaneous at bedtime  insulin glargine Injectable (LANTUS) 40 Unit(s) SubCutaneous every morning  insulin lispro (HumaLOG) corrective regimen sliding scale   SubCutaneous every 6 hours  lactated ringers Bolus 500 milliLiter(s) IV Bolus once  lactated ringers. 1000 milliLiter(s) (50 mL/Hr) IV Continuous <Continuous>  pantoprazole  Injectable 40 milliGRAM(s) IV Push daily    MEDICATIONS  (PRN):  acetaminophen   Tablet .. 650 milliGRAM(s) Oral every 6 hours PRN Temp greater or equal to 38C (100.4F), Mild Pain (1 - 3)  heparin  Injectable 44168 Unit(s) IV Push every 6 hours PRN For aPTT less than 40  heparin  Injectable 5000 Unit(s) IV Push every 6 hours PRN For aPTT between 40 - 57  hydrALAZINE Injectable 10 milliGRAM(s) IV Push every 4 hours PRN systolic > 180    Pertinent Labs: CBC Full  -  ( 14 Apr 2020 04:57 )  WBC Count : 8.46 K/uL  RBC Count : 3.35 M/uL  Hemoglobin : 9.9 g/dL  Hematocrit : 33.2 %  Platelet Count - Automated : 145 K/uL  Mean Cell Volume : 99.1 fl  Mean Cell Hemoglobin : 29.6 pg  Mean Cell Hemoglobin Concentration : 29.8 gm/dL  Auto Neutrophil # : 7.65 K/uL  Auto Lymphocyte # : 0.44 K/uL  Auto Monocyte # : 0.07 K/uL  Auto Eosinophil # : 0.00 K/uL  Auto Basophil # : 0.00 K/uL  Auto Neutrophil % : 90.4 %  Auto Lymphocyte % : 5.2 %  Auto Monocyte % : 0.8 %  Auto Eosinophil % : 0.0 %  Auto Basophil % : 0.0 %    04-14 Na155 mmol/L<H> Glu 192 mg/dL<H> K+ 4.9 mmol/L Cr  1.76 mg/dL<H> BUN 60.0 mg/dL<H> Phos 2.8 mg/dL Alb n/a   PAB n/a       Skin: Moisture associated dermatitis per documentation    Nutrition focused physical exam not conducted at this time - found signs of malnutrition [ ]absent [ ]present    Subcutaneous fat loss: [ ] Orbital fat pads region, [ ]Buccal fat region, [ ]Triceps region,  [ ]Ribs region    Muscle wasting: [ ]Temples region, [ ]Clavicle region, [ ]Shoulder region, [ ]Scapula region, [ ]Interosseous region,  [ ]thigh region, [ ]Calf region    Estimated Needs:   [ x] no change since previous assessment  [ ] recalculated:     Current Nutrition Diagnosis: Pt remains at nutrition risk secondary to increased nutrient needs (protein) related to increased physiological demand for nutrient as evidenced by hypoxia secondary to COVID-19 requiring intubation. Pt remains intubated/sedated. Continues to receive tube feeds. Propofol discontinued. Last BM 4/14 per documentation.     Recommendations:   1) Continue enteral regimen as ordered; additional free water per MD discretion.  2) Rx: liquid MVI daily.  3) Monitor tube feed tolerance.  4) Obtain daily weights as feasible to monitor trends.     Monitoring and Evaluation:   [ ] PO intake [ x] Tolerance to diet prescription [X] Weights  [X] Follow up per protocol [X] Labs

## 2020-04-15 NOTE — PROGRESS NOTE ADULT - SUBJECTIVE AND OBJECTIVE BOX
24h Events:  continues to spike fevers. tmax 102 . L subclavian central line placed and R IJ central line removed over night    ICU Vital Signs Last 24 Hrs  T(C): 38.1 (15 Apr 2020 00:29), Max: 39.6 (14 Apr 2020 03:52)  T(F): 100.6 (15 Apr 2020 00:29), Max: 103.2 (14 Apr 2020 03:52)  HR: 102 (15 Apr 2020 00:00) (70 - 111)  BP: 132/61 (15 Apr 2020 00:00) (105/55 - 132/61)  BP(mean): 87 (15 Apr 2020 00:00) (76 - 89)  ABP: 145/56 (15 Apr 2020 00:00) (99/48 - 179/67)  ABP(mean): 80 (15 Apr 2020 00:00) (63 - 96)  RR: 34 (15 Apr 2020 00:00) (12 - 43)  SpO2: 98% (15 Apr 2020 00:00) (88% - 98%)      I&O's Detail    13 Apr 2020 07:01  -  14 Apr 2020 07:00  --------------------------------------------------------  IN:    dexmedetomidine Infusion: 48.8 mL    Enteral Tube Flush: 600 mL    Free Water: 600 mL    Glucerna 1.5: 950 mL    heparin  Infusion.: 279 mL    HYDROmorphone  Infusion: 12.5 mL    Lactated Ringers IV Bolus: 500 mL    lactated ringers.: 900 mL    multiple electrolytes Injection Type 1multiple electrolytes Injection Type 1: 250 mL    propofol Infusion: 11.4 mL  Total IN: 4151.7 mL    OUT:    Indwelling Catheter - Urethral: 2260 mL  Total OUT: 2260 mL    Total NET: 1891.7 mL      14 Apr 2020 07:01  -  15 Apr 2020 02:50  --------------------------------------------------------  IN:    dexmedetomidine Infusion: 37.7 mL    dextrose 5%.: 210 mL    Free Water: 900 mL    Glucerna 1.5: 900 mL    heparin  Infusion.: 225 mL    heparin  Infusion.: 45 mL    HYDROmorphone  Infusion: 1.5 mL    lactated ringers.: 500 mL    Solution: 250 mL  Total IN: 3069.2 mL    OUT:    Indwelling Catheter - Urethral: 1850 mL  Total OUT: 1850 mL    Total NET: 1219.2 mL          ABG - ( 14 Apr 2020 04:33 )  pH, Arterial: 7.42  pH, Blood: x     /  pCO2: 46    /  pO2: 96    / HCO3: 29    / Base Excess: 5.0   /  SaO2: 98                  MEDICATIONS  (STANDING):  ALBUTerol    90 MICROgram(s) HFA Inhaler 6 Puff(s) Inhalation every 6 hours  aMIOdarone    Tablet 200 milliGRAM(s) Oral daily  aspirin  chewable 81 milliGRAM(s) Oral daily  cefepime   IVPB 1000 milliGRAM(s) IV Intermittent every 8 hours  chlorhexidine 0.12% Liquid 15 milliLiter(s) Oral Mucosa every 12 hours  chlorhexidine 2% Cloths 1 Application(s) Topical daily  chlorhexidine 4% Liquid 1 Application(s) Topical <User Schedule>  clonazePAM  Tablet 0.5 milliGRAM(s) Oral every 12 hours  dexMEDEtomidine Infusion 0.2 MICROgram(s)/kG/Hr (6.28 mL/Hr) IV Continuous <Continuous>  dextrose 5%. 1000 milliLiter(s) (42 mL/Hr) IV Continuous <Continuous>  heparin  Infusion.  Unit(s)/Hr (15 mL/Hr) IV Continuous <Continuous>  insulin glargine Injectable (LANTUS) 60 Unit(s) SubCutaneous at bedtime  insulin glargine Injectable (LANTUS) 40 Unit(s) SubCutaneous every morning  insulin lispro (HumaLOG) corrective regimen sliding scale   SubCutaneous every 6 hours  norepinephrine Infusion 0.05 MICROgram(s)/kG/Min (5.89 mL/Hr) IV Continuous <Continuous>  pantoprazole  Injectable 40 milliGRAM(s) IV Push daily  vancomycin  IVPB 1000 milliGRAM(s) IV Intermittent every 12 hours  vancomycin  IVPB        MEDICATIONS  (PRN):  acetaminophen   Tablet .. 650 milliGRAM(s) Oral every 6 hours PRN Temp greater or equal to 38C (100.4F), Mild Pain (1 - 3)  heparin  Injectable 90330 Unit(s) IV Push every 6 hours PRN For aPTT less than 40  heparin  Injectable 5000 Unit(s) IV Push every 6 hours PRN For aPTT between 40 - 57  hydrALAZINE Injectable 10 milliGRAM(s) IV Push every 4 hours PRN systolic > 180  sodium chloride 0.9% lock flush 10 milliLiter(s) IV Push every 1 hour PRN Pre/post blood products, medications, blood draw, and to maintain line patency        Physical Exam:    Neurological: precedex, gtt for sedation / analgesia    Pulmonary: mechanical ventilation; AC 24/550/50% / + 10    Cardiovascular: S1, S2, regular rate & rhythm    Gastrointestinal: soft, non-tender, non-distended, no rebound / guarding    : brown in place draining yellow urine    Skin: warm, dry, no diaphoresis, no pallor, cyanosis, or jaundice    LABS:  CBC Full  -  ( 14 Apr 2020 04:57 )  WBC Count : 8.46 K/uL  RBC Count : 3.35 M/uL  Hemoglobin : 9.9 g/dL  Hematocrit : 33.2 %  Platelet Count - Automated : 145 K/uL  Mean Cell Volume : 99.1 fl  Mean Cell Hemoglobin : 29.6 pg  Mean Cell Hemoglobin Concentration : 29.8 gm/dL  Auto Neutrophil # : 7.65 K/uL  Auto Lymphocyte # : 0.44 K/uL  Auto Monocyte # : 0.07 K/uL  Auto Eosinophil # : 0.00 K/uL  Auto Basophil # : 0.00 K/uL  Auto Neutrophil % : 90.4 %  Auto Lymphocyte % : 5.2 %  Auto Monocyte % : 0.8 %  Auto Eosinophil % : 0.0 %  Auto Basophil % : 0.0 %    04-14    155<H>  |  117<H>  |  60.0<H>  ----------------------------<  192<H>  4.9   |  27.0  |  1.76<H>    Ca    7.9<L>      14 Apr 2020 04:57  Phos  2.8     04-14  Mg     3.0     04-14      PT/INR - ( 13 Apr 2020 06:17 )   PT: 17.4 sec;   INR: 1.52 ratio         PTT - ( 14 Apr 2020 19:50 )  PTT:85.4 sec    RECENT CULTURES:        CARDIAC MARKERS ( 13 Apr 2020 06:17 )  x     / 0.11 ng/mL / 397 U/L / x     / <1.0 ng/mL          ASSESSMENT/PLAN:  74y Male COVID + .     Neuro: sedation / analgesia with precedex,  delirium precautions, daily sedation holiday    CV: continue invasive monitoring with arterial line    Pulm: wean FiO2 / PEEP aas tolerated    GI/Nutrition: NPO with tube feeds    /Renal: brown for strict I&O, monitor kidney fxn    ID: spiking temps, elevated procal, continue vanc and cefepime    Endo: monitor blood glucose    Skin: repositioning for DTI prevention while in bed    Heme/DVT Prophylaxis: SCDs, hep gtt - monitor PTT

## 2020-04-15 NOTE — PROGRESS NOTE ADULT - ATTENDING COMMENTS
75 yo M presents with ARDS due to COVID 19.  Edi temp with tmax 102 . L subclavian central line placed and R IJ central line removed over night  Intubated, vented, and sedated. open eyes, MUHAMMAD, does not follow command, GCS 9T. PF ratio 194. L CT with small intermittent leak.  Continue Amio gtt and hep gtt for afib.  Continue wean vent as tolerated  Continue sedation to enhance vent wean  Continue to control BG  with lantus and high ISS   VTE ppx SCD/heparin   Continue ICU care for cardiopulmonary support         code [id number]       CCT 50 min .

## 2020-04-15 NOTE — CHART NOTE - NSCHARTNOTEFT_GEN_A_CORE
R IJ central line removed. patient tolerated procedure well, pressure held and hemostasis achieved  L subclavian line dressing saturated with blood. dressing replaced and surgicel placed

## 2020-04-16 NOTE — CHART NOTE - NSCHARTNOTEFT_GEN_A_CORE
Long discussion with patient's son Claudio, he expressed that he and his other siblings wish to change their father's code status to DNR as his condition continues to decline over the last 48H. The order has been placed in the chart in line with their wishes. I continued the conversation and discussed the idea of comfort care should his condition worsen. Claudio told me that he will bring this information back to his siblings and they would discuss this option.

## 2020-04-16 NOTE — PROGRESS NOTE ADULT - SUBJECTIVE AND OBJECTIVE BOX
24h Events:  blood cultures positive for candida , started on fluconazole    ICU Vital Signs Last 24 Hrs  T(C): 37.9 (16 Apr 2020 00:00), Max: 38.6 (15 Apr 2020 07:44)  T(F): 100.2 (16 Apr 2020 00:00), Max: 101.4 (15 Apr 2020 07:44)  HR: 68 (16 Apr 2020 00:00) (64 - 101)  BP: 119/67 (16 Apr 2020 00:00) (119/62 - 154/69)  BP(mean): 88 (16 Apr 2020 00:00) (85 - 99)  ABP: 99/44 (16 Apr 2020 00:00) (99/44 - 145/60)  ABP(mean): 61 (16 Apr 2020 00:00) (61 - 84)  RR: 30 (16 Apr 2020 00:00) (24 - 34)  SpO2: 96% (16 Apr 2020 00:00) (94% - 99%)      I&O's Detail    14 Apr 2020 07:01  -  15 Apr 2020 07:00  --------------------------------------------------------  IN:    dexmedetomidine Infusion: 56.3 mL    dextrose 5%.: 336 mL    Free Water: 1200 mL    Glucerna 1.5: 1150 mL    heparin  Infusion.: 45 mL    heparin  Infusion.: 315 mL    HYDROmorphone  Infusion: 1.5 mL    lactated ringers.: 500 mL    Solution: 300 mL    Solution: 250 mL  Total IN: 4153.8 mL    OUT:    Indwelling Catheter - Urethral: 2150 mL  Total OUT: 2150 mL    Total NET: 2003.8 mL      15 Apr 2020 07:01  -  16 Apr 2020 03:49  --------------------------------------------------------  IN:    amiodarone Infusion: 83.6 mL    dexmedetomidine Infusion: 37.2 mL    dextrose 5%.: 504 mL    Free Water: 300 mL    Glucerna 1.5: 600 mL    heparin  Infusion.: 180 mL  Total IN: 1704.8 mL    OUT:    Indwelling Catheter - Urethral: 1325 mL  Total OUT: 1325 mL    Total NET: 379.8 mL      ABG - ( 15 Apr 2020 05:29 )  pH, Arterial: 7.42  pH, Blood: x     /  pCO2: 48    /  pO2: 97    / HCO3: 29    / Base Excess: 5.5   /  SaO2: 98        MEDICATIONS  (STANDING):  ALBUTerol    90 MICROgram(s) HFA Inhaler 6 Puff(s) Inhalation every 6 hours  aMIOdarone Infusion 0.5 mG/Min (16.7 mL/Hr) IV Continuous <Continuous>  aspirin  chewable 81 milliGRAM(s) Oral daily  cefepime   IVPB 1000 milliGRAM(s) IV Intermittent every 8 hours  chlorhexidine 0.12% Liquid 15 milliLiter(s) Oral Mucosa every 12 hours  chlorhexidine 2% Cloths 1 Application(s) Topical daily  chlorhexidine 4% Liquid 1 Application(s) Topical <User Schedule>  clonazePAM  Tablet 0.5 milliGRAM(s) Oral every 12 hours  dexMEDEtomidine Infusion 0.2 MICROgram(s)/kG/Hr (6.28 mL/Hr) IV Continuous <Continuous>  dextrose 5%. 1000 milliLiter(s) (42 mL/Hr) IV Continuous <Continuous>  fluconAZOLE IVPB 400 milliGRAM(s) IV Intermittent every 24 hours  heparin  Infusion.  Unit(s)/Hr (15 mL/Hr) IV Continuous <Continuous>  insulin glargine Injectable (LANTUS) 60 Unit(s) SubCutaneous at bedtime  insulin glargine Injectable (LANTUS) 40 Unit(s) SubCutaneous every morning  insulin lispro (HumaLOG) corrective regimen sliding scale   SubCutaneous every 6 hours  norepinephrine Infusion 0.05 MICROgram(s)/kG/Min (5.89 mL/Hr) IV Continuous <Continuous>  pantoprazole  Injectable 40 milliGRAM(s) IV Push daily  vancomycin  IVPB 1500 milliGRAM(s) IV Intermittent every 24 hours    MEDICATIONS  (PRN):  acetaminophen   Tablet .. 650 milliGRAM(s) Oral every 6 hours PRN Temp greater or equal to 38C (100.4F), Mild Pain (1 - 3)  heparin  Injectable 87054 Unit(s) IV Push every 6 hours PRN For aPTT less than 40  heparin  Injectable 5000 Unit(s) IV Push every 6 hours PRN For aPTT between 40 - 57  hydrALAZINE Injectable 10 milliGRAM(s) IV Push every 4 hours PRN systolic > 180  sodium chloride 0.9% lock flush 10 milliLiter(s) IV Push every 1 hour PRN Pre/post blood products, medications, blood draw, and to maintain line patency        Physical Exam:    Neurological: precedex gtt for sedation / analgesia    Pulmonary: mechanical ventilation; AC 24/550/50/+8    Cardiovascular: S1, S2, regular rate & rhythm    Gastrointestinal: soft, non-tender, non-distended, no rebound / guarding    : brown in place draining yellow urine    Skin: warm, dry, no diaphoresis, no pallor, cyanosis, or jaundice    LABS:  CBC Full  -  ( 15 Apr 2020 05:22 )  WBC Count : 7.61 K/uL  RBC Count : 3.36 M/uL  Hemoglobin : 9.9 g/dL  Hematocrit : 33.7 %  Platelet Count - Automated : 159 K/uL  Mean Cell Volume : 100.3 fl  Mean Cell Hemoglobin : 29.5 pg  Mean Cell Hemoglobin Concentration : 29.4 gm/dL  Auto Neutrophil # : 6.14 K/uL  Auto Lymphocyte # : 0.93 K/uL  Auto Monocyte # : 0.48 K/uL  Auto Eosinophil # : 0.00 K/uL  Auto Basophil # : 0.01 K/uL  Auto Neutrophil % : 80.7 %  Auto Lymphocyte % : 12.2 %  Auto Monocyte % : 6.3 %  Auto Eosinophil % : 0.0 %  Auto Basophil % : 0.1 %    04-15    155<H>  |  116<H>  |  59.0<H>  ----------------------------<  260<H>  4.4   |  28.0  |  1.67<H>    Ca    8.0<L>      15 Apr 2020 05:22  Phos  3.5     04-15  Mg     2.9     04-15      PTT - ( 16 Apr 2020 00:23 )  PTT:69.9 sec    RECENT CULTURES:  04-14 .Blood Blood Culture PCR XXXX   Growth in aerobic bottle: Yeast  Aerobic Bottle: 22:04 Hours to positivity  Anaerobic Bottle: No growth to date  ***Blood Panel PCR results on this specimen are available  approximately 3 hours after the Gram stain result.***  Gram stain, PCR, and/or culture results may not always  correspond due to difference in methodologies.  ************************************************************  This PCR assay was performed using [employer].  The following targets are tested for: Enterococcus,  vancomycin resistant enterococci, Listeria monocytogenes,  coagulase negative staphylococci, S. aureus,  methicillin resistant S. aureus, Streptococcus agalactiae  (Group B), S. pneumoniae, S. pyogenes (Group A),  Acinetobacter baumannii, Enterobacter cloacae, E. coli,  Klebsiella oxytoca, K. pneumoniae, Proteus sp.,  Serratia marcescens, Haemophilus influenzae,  Neisseria meningitidis, Pseudomonas aeruginosa, Candida  albicans, C. glabrata, C krusei, C parapsilosis,  C. tropicalis and the KPC resistance gene.  ,  TYPE: (C=Critical, N=Notification, A=Abnormal) c  TESTS:  _ gs  DATE/TIME CALLED: _ 04/15/2020 16:46:27  CALLED TO: Lloyd clarke rn  READ BACK (2 Patient Identifiers)(Y/N): _ y  READ BACK VALUES (Y/N): _ y  CALLED BY: Lloyd rubin      ASSESSMENT/PLAN:  74y Male COVID + , severe sepsis    Neuro: sedation / analgesia with precedex, delirium precautions, daily sedation holiday    CV: continue invasive monitoring with arterial line    Pulm: wean FiO2 / PEEP aas tolerated    GI/Nutrition: NPO with tube feeds - hold feeds if prone    /Renal: brown for strict I&O, monitor kidney fxn    ID: [id number],     Endo: monitor blood glucose    Skin: repositioning for DTI prevention while in bed    Heme/DVT Prophylaxis: SCDs , heparin gtt

## 2020-04-17 NOTE — PROGRESS NOTE ADULT - SUBJECTIVE AND OBJECTIVE BOX
24h Events: No acute events overnight. Continues to respond to commands. Speciation of blood cx yesterday revealed candida tropicalis; continued on fluconazole. P/F slightly improved today from 136 -> 190. Hypernatremia worsened from 156 -> 159 - increased free water. Yesterday decision made by patient's children that they wish for him to be DNR.     ICU Vital Signs Last 24 Hrs  T(C): 37.5 (17 Apr 2020 08:01), Max: 38.3 (17 Apr 2020 04:54)  T(F): 99.5 (17 Apr 2020 08:01), Max: 101 (17 Apr 2020 04:54)  HR: 75 (17 Apr 2020 04:00) (50 - 75)  BP: 116/60 (16 Apr 2020 12:00) (116/60 - 116/60)  BP(mean): 83 (16 Apr 2020 12:00) (83 - 83)  ABP: 154/58 (17 Apr 2020 04:00) (98/50 - 154/58)  ABP(mean): 80 (17 Apr 2020 04:00) (65 - 80)  RR: 24 (16 Apr 2020 20:00) (24 - 24)  SpO2: 98% (17 Apr 2020 04:00) (94% - 98%)      I&O's Detail    16 Apr 2020 07:01  -  17 Apr 2020 07:00  --------------------------------------------------------  IN:    amiodarone Infusion: 66.8 mL    dexmedetomidine Infusion: 9.3 mL    Free Water: 300 mL    Glucerna 1.5: 1050 mL    heparin  Infusion.: 220 mL    HYDROmorphone  Infusion: 8 mL    multiple electrolytes Injection Type 1: 700 mL  Total IN: 2354.1 mL    OUT:    Indwelling Catheter - Urethral: 1325 mL  Total OUT: 1325 mL    Total NET: 1029.1 mL      ABG - ( 17 Apr 2020 05:32 )  pH, Arterial: 7.44  pH, Blood: x     /  pCO2: 43    /  pO2: 95    / HCO3: 28    / Base Excess: 4.2   /  SaO2: 98          MEDICATIONS  (STANDING):  ALBUTerol    90 MICROgram(s) HFA Inhaler 6 Puff(s) Inhalation every 6 hours  aMIOdarone    Tablet 200 milliGRAM(s) Oral daily  aspirin  chewable 81 milliGRAM(s) Oral daily  cefepime   IVPB 1000 milliGRAM(s) IV Intermittent every 8 hours  chlorhexidine 0.12% Liquid 15 milliLiter(s) Oral Mucosa every 12 hours  chlorhexidine 2% Cloths 1 Application(s) Topical daily  chlorhexidine 4% Liquid 1 Application(s) Topical <User Schedule>  clonazePAM  Tablet 0.5 milliGRAM(s) Oral every 12 hours  fluconAZOLE IVPB 400 milliGRAM(s) IV Intermittent every 24 hours  heparin  Infusion.  Unit(s)/Hr (15 mL/Hr) IV Continuous <Continuous>  HYDROmorphone Infusion 0.25 mG/Hr (0.25 mL/Hr) IV Continuous <Continuous>  insulin glargine Injectable (LANTUS) 60 Unit(s) SubCutaneous at bedtime  insulin glargine Injectable (LANTUS) 40 Unit(s) SubCutaneous every morning  insulin lispro (HumaLOG) corrective regimen sliding scale   SubCutaneous every 6 hours  multiple electrolytes Injection Type 1 1000 milliLiter(s) (50 mL/Hr) IV Continuous <Continuous>  norepinephrine Infusion 0.05 MICROgram(s)/kG/Min (5.89 mL/Hr) IV Continuous <Continuous>  pantoprazole  Injectable 40 milliGRAM(s) IV Push daily  vancomycin  IVPB 1500 milliGRAM(s) IV Intermittent every 24 hours    MEDICATIONS  (PRN):  acetaminophen   Tablet .. 650 milliGRAM(s) Oral every 6 hours PRN Temp greater or equal to 38C (100.4F), Mild Pain (1 - 3)  heparin  Injectable 69310 Unit(s) IV Push every 6 hours PRN For aPTT less than 40  heparin  Injectable 5000 Unit(s) IV Push every 6 hours PRN For aPTT between 40 - 57  hydrALAZINE Injectable 10 milliGRAM(s) IV Push every 4 hours PRN systolic > 180  sodium chloride 0.9% lock flush 10 milliLiter(s) IV Push every 1 hour PRN Pre/post blood products, medications, blood draw, and to maintain line patency        Physical Exam:    Neurological: RASS -2, opens eyes to voice, following commands.     Pulmonary: Mode: AC/ CMV (Assist Control/ Continuous Mandatory Ventilation)  RR (machine): 20  TV (machine): 500  FiO2: 50  PEEP: 12  ITime: 0.8  MAP: 18  PIP: 30    Cardiovascular: S1, S2, regular rate & rhythm    Gastrointestinal: softly distended, non-tender, no rebound / guarding    : brown in place draining yellow urine    Skin: warm, dry, no diaphoresis, no pallor, cyanosis, or jaundice      LABS:  CBC Full  -  ( 17 Apr 2020 05:26 )  WBC Count : 5.61 K/uL  RBC Count : 2.97 M/uL  Hemoglobin : 8.8 g/dL  Hematocrit : 30.0 %  Platelet Count - Automated : 170 K/uL  Mean Cell Volume : 101.0 fl  Mean Cell Hemoglobin : 29.6 pg  Mean Cell Hemoglobin Concentration : 29.3 gm/dL  Auto Neutrophil # : 3.92 K/uL  Auto Lymphocyte # : 1.24 K/uL  Auto Monocyte # : 0.29 K/uL  Auto Eosinophil # : 0.07 K/uL  Auto Basophil # : 0.03 K/uL  Auto Neutrophil % : 69.9 %  Auto Lymphocyte % : 22.1 %  Auto Monocyte % : 5.2 %  Auto Eosinophil % : 1.2 %  Auto Basophil % : 0.5 %    04-17    159<H>  |  121<H>  |  53.0<H>  ----------------------------<  97  4.2   |  26.0  |  1.82<H>    Ca    7.9<L>      17 Apr 2020 05:26  Phos  3.9     04-17  Mg     2.7     04-17      PTT - ( 17 Apr 2020 05:32 )  PTT:57.8 sec    RECENT CULTURES:  04-14 .Blood Candida tropicalis  Blood Culture PCR XXXX   Growth in aerobic bottle: Candida tropicalis  Aerobic Bottle: 22:04 Hours to positivity  Anaerobic Bottle: No growth to date  ***Blood Panel PCR results on this specimen are available  approximately 3 hours after the Gram stain result.***  Gram stain, PCR, and/or culture results may not always  correspond due to difference in methodologies.  ************************************************************  This PCR assay was performed using [employer].  The following targets are tested for: Enterococcus,  vancomycin resistant enterococci, Listeria monocytogenes,  coagulase negative staphylococci, S. aureus,  methicillin resistant S. aureus, Streptococcus agalactiae  (Group B), S. pneumoniae, S. pyogenes (Group A),  Acinetobacter baumannii, Enterobacter cloacae, E. coli,  Klebsiella oxytoca, K. pneumoniae, Proteus sp.,  Serratia marcescens, Haemophilus influenzae,  Neisseria meningitidis, Pseudomonas aeruginosa, Candida  albicans, C. glabrata, C krusei, C parapsilosis,  C. tropicalis and the KPC resistance gene.  ,  TYPE: (C=Critical, N=Notification, A=Abnormal) c  TESTS:  _ gs  DATE/TIME CALLED: _ 04/15/2020 16:46:27  CALLED TO: Lloyd clarke rn  READ BACK (2 Patient Identifiers)(Y/N): _ y  READ BACK VALUES (Y/N): _ y  CALLED BY: Lloyd rubin              ASSESSMENT/PLAN:  74y male COVID + w/ hypoxic respiratory failure. Hospital course complicated by superimposed bacterial PNA and most recently, fungemia.     Neuro: continue analgesia w/ dilaudid gtt @ current rate, delirium precautions, daily sedation holiday    CV: continue invasive monitoring with arterial line    Pulm: wean FiO2 / PEEP aas tolerated    GI/Nutrition: NPO with tube feeds - hold feeds if prone    /Renal: kevin for strict I&O, monitor kidney fxn    ID: plaquenil     Endo: monitor blood glucose    Skin: repositioning for DTI prevention while in bed    Heme/DVT Prophylaxis: SCDs / SQH / lovenox ppx 24h Events: No acute events overnight. Continues to respond to commands. Speciation of blood cx yesterday revealed candida tropicalis; continued on fluconazole. P/F slightly improved today from 136 -> 190. Hypernatremia worsened from 156 -> 159 - increased free water. Yesterday decision made by patient's children that they wish for him to be DNR.     ICU Vital Signs Last 24 Hrs  T(C): 37.5 (17 Apr 2020 08:01), Max: 38.3 (17 Apr 2020 04:54)  T(F): 99.5 (17 Apr 2020 08:01), Max: 101 (17 Apr 2020 04:54)  HR: 75 (17 Apr 2020 04:00) (50 - 75)  BP: 116/60 (16 Apr 2020 12:00) (116/60 - 116/60)  BP(mean): 83 (16 Apr 2020 12:00) (83 - 83)  ABP: 154/58 (17 Apr 2020 04:00) (98/50 - 154/58)  ABP(mean): 80 (17 Apr 2020 04:00) (65 - 80)  RR: 24 (16 Apr 2020 20:00) (24 - 24)  SpO2: 98% (17 Apr 2020 04:00) (94% - 98%)      I&O's Detail    16 Apr 2020 07:01  -  17 Apr 2020 07:00  --------------------------------------------------------  IN:    amiodarone Infusion: 66.8 mL    dexmedetomidine Infusion: 9.3 mL    Free Water: 300 mL    Glucerna 1.5: 1050 mL    heparin  Infusion.: 220 mL    HYDROmorphone  Infusion: 8 mL    multiple electrolytes Injection Type 1: 700 mL  Total IN: 2354.1 mL    OUT:    Indwelling Catheter - Urethral: 1325 mL  Total OUT: 1325 mL    Total NET: 1029.1 mL      ABG - ( 17 Apr 2020 05:32 )  pH, Arterial: 7.44  pH, Blood: x     /  pCO2: 43    /  pO2: 95    / HCO3: 28    / Base Excess: 4.2   /  SaO2: 98          MEDICATIONS  (STANDING):  ALBUTerol    90 MICROgram(s) HFA Inhaler 6 Puff(s) Inhalation every 6 hours  aMIOdarone    Tablet 200 milliGRAM(s) Oral daily  aspirin  chewable 81 milliGRAM(s) Oral daily  cefepime   IVPB 1000 milliGRAM(s) IV Intermittent every 8 hours  chlorhexidine 0.12% Liquid 15 milliLiter(s) Oral Mucosa every 12 hours  chlorhexidine 2% Cloths 1 Application(s) Topical daily  chlorhexidine 4% Liquid 1 Application(s) Topical <User Schedule>  clonazePAM  Tablet 0.5 milliGRAM(s) Oral every 12 hours  fluconAZOLE IVPB 400 milliGRAM(s) IV Intermittent every 24 hours  heparin  Infusion.  Unit(s)/Hr (15 mL/Hr) IV Continuous <Continuous>  HYDROmorphone Infusion 0.25 mG/Hr (0.25 mL/Hr) IV Continuous <Continuous>  insulin glargine Injectable (LANTUS) 60 Unit(s) SubCutaneous at bedtime  insulin glargine Injectable (LANTUS) 40 Unit(s) SubCutaneous every morning  insulin lispro (HumaLOG) corrective regimen sliding scale   SubCutaneous every 6 hours  multiple electrolytes Injection Type 1 1000 milliLiter(s) (50 mL/Hr) IV Continuous <Continuous>  norepinephrine Infusion 0.05 MICROgram(s)/kG/Min (5.89 mL/Hr) IV Continuous <Continuous>  pantoprazole  Injectable 40 milliGRAM(s) IV Push daily  vancomycin  IVPB 1500 milliGRAM(s) IV Intermittent every 24 hours    MEDICATIONS  (PRN):  acetaminophen   Tablet .. 650 milliGRAM(s) Oral every 6 hours PRN Temp greater or equal to 38C (100.4F), Mild Pain (1 - 3)  heparin  Injectable 31429 Unit(s) IV Push every 6 hours PRN For aPTT less than 40  heparin  Injectable 5000 Unit(s) IV Push every 6 hours PRN For aPTT between 40 - 57  hydrALAZINE Injectable 10 milliGRAM(s) IV Push every 4 hours PRN systolic > 180  sodium chloride 0.9% lock flush 10 milliLiter(s) IV Push every 1 hour PRN Pre/post blood products, medications, blood draw, and to maintain line patency        Physical Exam:    Neurological: RASS -2, opens eyes to voice, following commands.     Pulmonary: Mode: AC/ CMV (Assist Control/ Continuous Mandatory Ventilation)  RR (machine): 20  TV (machine): 500  FiO2: 50  PEEP: 12  ITime: 0.8  MAP: 18  PIP: 30    Cardiovascular: S1, S2, regular rate & rhythm    Gastrointestinal: softly distended, non-tender, no rebound / guarding    : brown in place draining yellow urine    Skin: warm, dry, no diaphoresis, no pallor, cyanosis, or jaundice      LABS:  CBC Full  -  ( 17 Apr 2020 05:26 )  WBC Count : 5.61 K/uL  RBC Count : 2.97 M/uL  Hemoglobin : 8.8 g/dL  Hematocrit : 30.0 %  Platelet Count - Automated : 170 K/uL  Mean Cell Volume : 101.0 fl  Mean Cell Hemoglobin : 29.6 pg  Mean Cell Hemoglobin Concentration : 29.3 gm/dL  Auto Neutrophil # : 3.92 K/uL  Auto Lymphocyte # : 1.24 K/uL  Auto Monocyte # : 0.29 K/uL  Auto Eosinophil # : 0.07 K/uL  Auto Basophil # : 0.03 K/uL  Auto Neutrophil % : 69.9 %  Auto Lymphocyte % : 22.1 %  Auto Monocyte % : 5.2 %  Auto Eosinophil % : 1.2 %  Auto Basophil % : 0.5 %    04-17    159<H>  |  121<H>  |  53.0<H>  ----------------------------<  97  4.2   |  26.0  |  1.82<H>    Ca    7.9<L>      17 Apr 2020 05:26  Phos  3.9     04-17  Mg     2.7     04-17      PTT - ( 17 Apr 2020 05:32 )  PTT:57.8 sec    RECENT CULTURES:  04-14 .Blood Candida tropicalis  Blood Culture PCR XXXX   Growth in aerobic bottle: Candida tropicalis  Aerobic Bottle: 22:04 Hours to positivity  Anaerobic Bottle: No growth to date  ***Blood Panel PCR results on this specimen are available  approximately 3 hours after the Gram stain result.***  Gram stain, PCR, and/or culture results may not always  correspond due to difference in methodologies.  ************************************************************  This PCR assay was performed using [employer].  The following targets are tested for: Enterococcus,  vancomycin resistant enterococci, Listeria monocytogenes,  coagulase negative staphylococci, S. aureus,  methicillin resistant S. aureus, Streptococcus agalactiae  (Group B), S. pneumoniae, S. pyogenes (Group A),  Acinetobacter baumannii, Enterobacter cloacae, E. coli,  Klebsiella oxytoca, K. pneumoniae, Proteus sp.,  Serratia marcescens, Haemophilus influenzae,  Neisseria meningitidis, Pseudomonas aeruginosa, Candida  albicans, C. glabrata, C krusei, C parapsilosis,  C. tropicalis and the KPC resistance gene.  ,  TYPE: (C=Critical, N=Notification, A=Abnormal) c  TESTS:  _ gs  DATE/TIME CALLED: _ 04/15/2020 16:46:27  CALLED TO: Lloyd clarke rn  READ BACK (2 Patient Identifiers)(Y/N): _ y  READ BACK VALUES (Y/N): _ y  CALLED BY: Lloyd rubin              ASSESSMENT/PLAN:  74y male COVID + w/ hypoxic respiratory failure. Hospital course complicated by superimposed bacterial PNA and most recently, fungemia.     Neuro: continue analgesia w/ dilaudid gtt @ current rate, klonopin BID, delirium precautions, daily sedation holiday    CV: maintain MAPS > 65, PO amiodarone, continue invasive monitoring with arterial line    Pulm: P/F with slight improvement; wean FiO2 / PEEP as tolerated    GI/Nutrition: NPO with tube feeds - hold feeds if prone. Continue IVF, free H20 increased to 300q4h for worsening hyperNa    /Renal: brown for strict I&O, monitor kidney fxn, Cr appears to be plateauing at approx 1.7-1.8.    ID: continue Fluconazle for fungemia, cefepime & vancomycin for superimposed bacterial PNA to end 4/19    Endo: blood glucose downtrending, will decrease nighttime lantus from 60u to 40u and continue remainder of diabetic regimen     Skin: repositioning for DTI prevention while in bed    Heme/DVT Prophylaxis: SCDs / heparin gtt for hypercoagulable state, goal PTT 58-99

## 2020-04-17 NOTE — CHART NOTE - NSCHARTNOTEFT_GEN_A_CORE
spoke with son , confirmed conservation with MARIA ESTHER stearns. Family would like patient to be DNR and are considering comfort measures. Long discussion with son avis and he would like to wait another 24hours to speak with his family for full comfort measures. he feels his dad "would not want to live like this" pal care consult placed

## 2020-04-17 NOTE — PROGRESS NOTE ADULT - ATTENDING COMMENTS
I have seen and examined the patient.  Wean sedation aggressive delirium preventions.  perfusion is adequate  ventilation and oxygenation have improved, plan to transition to PSV P:[id number]  cont treatment for fungemia  correct free water deficit IV

## 2020-04-18 NOTE — PROGRESS NOTE ADULT - ATTENDING COMMENTS
The patient was seen and examined  Events noted  No new problems    Neurologic:  Sedated  Respiratory:  SIMV 10, 40%, PEEP+10;   Hemodynamic:  Normal  Renal:  Urine flow okay, Fs=480 mEq/ml  GI:  TEN  Hematologic  Hgb okay  ID:  No new issues    Plan:  Continue free water replacement  Wean IMV rate  Stop Dilaudid  Anticoagulation

## 2020-04-18 NOTE — PROGRESS NOTE ADULT - SUBJECTIVE AND OBJECTIVE BOX
24h Events: Failed a trial of PS yesterday. Switched to SIMV and maintained well with a . Cultures showed Candida Tropicalis and was started on Micafungin. Increased Free H20 for hypernatremia. Sedation gtt titrated down and started on Fentanyl PRN pushes. Long discussion with family yesterday and patient was made DNR with possibly comfort measures after family has time to discuss.     ICU Vital Signs Last 24 Hrs  T(C): 39.4 (18 Apr 2020 04:00), Max: 39.4 (18 Apr 2020 04:00)  T(F): 102.9 (18 Apr 2020 04:00), Max: 102.9 (18 Apr 2020 04:00)  HR: 86 (18 Apr 2020 04:00) (71 - 114)  BP: --  BP(mean): --  ABP: 157/58 (18 Apr 2020 04:00) (116/47 - 168/67)  ABP(mean): 82 (18 Apr 2020 04:00) (66 - 95)  RR: 21 (18 Apr 2020 04:00) (20 - 31)  SpO2: 92% (18 Apr 2020 04:00) (92% - 97%)      I&O's Detail    16 Apr 2020 07:01  -  17 Apr 2020 07:00  --------------------------------------------------------  IN:    amiodarone Infusion: 66.8 mL    dexmedetomidine Infusion: 9.3 mL    Free Water: 600 mL    Glucerna 1.5: 1100 mL    heparin  Infusion.: 235 mL    HYDROmorphone  Infusion: 8.5 mL    multiple electrolytes Injection Type 1multiple electrolytes Injection Type 1: 750 mL    Solution: 500 mL  Total IN: 3269.6 mL    OUT:    Indwelling Catheter - Urethral: 1325 mL  Total OUT: 1325 mL    Total NET: 1944.6 mL      17 Apr 2020 07:01  -  18 Apr 2020 04:50  --------------------------------------------------------  IN:    dextrose 5%.: 378 mL    Enteral Tube Flush: 200 mL    Free Water: 900 mL    Glucerna 1.5: 600 mL    heparin  Infusion.: 180 mL    HYDROmorphone  Infusion: 6 mL    multiple electrolytes Injection Type 1multiple electrolytes Injection Type 1: 200 mL    Solution: 250 mL    Solution: 50 mL  Total IN: 2764 mL    OUT:    Indwelling Catheter - Urethral: 1600 mL  Total OUT: 1600 mL    Total NET: 1164 mL    ABG - ( 17 Apr 2020 13:42 )  pH, Arterial: 7.39  pH, Blood: x     /  pCO2: 52    /  pO2: 81    / HCO3: 29    / Base Excess: 5.3   /  SaO2: 96          MEDICATIONS  (STANDING):  ALBUTerol    90 MICROgram(s) HFA Inhaler 6 Puff(s) Inhalation every 6 hours  aMIOdarone    Tablet 200 milliGRAM(s) Oral daily  aspirin  chewable 81 milliGRAM(s) Oral daily  cefepime   IVPB 1000 milliGRAM(s) IV Intermittent every 8 hours  chlorhexidine 0.12% Liquid 15 milliLiter(s) Oral Mucosa every 12 hours  chlorhexidine 2% Cloths 1 Application(s) Topical daily  clonazePAM  Tablet 0.5 milliGRAM(s) Oral every 12 hours  dextrose 5%. 1000 milliLiter(s) (42 mL/Hr) IV Continuous <Continuous>  fluconAZOLE IVPB 400 milliGRAM(s) IV Intermittent every 24 hours  heparin  Infusion.  Unit(s)/Hr (15 mL/Hr) IV Continuous <Continuous>  HYDROmorphone Infusion 0.25 mG/Hr (0.25 mL/Hr) IV Continuous <Continuous>  insulin glargine Injectable (LANTUS) 40 Unit(s) SubCutaneous every morning  insulin glargine Injectable (LANTUS) 40 Unit(s) SubCutaneous at bedtime  insulin lispro (HumaLOG) corrective regimen sliding scale   SubCutaneous every 6 hours  vancomycin  IVPB 1500 milliGRAM(s) IV Intermittent every 24 hours    MEDICATIONS  (PRN):  acetaminophen   Tablet .. 650 milliGRAM(s) Oral every 6 hours PRN Temp greater or equal to 38C (100.4F), Mild Pain (1 - 3)  fentaNYL    Injectable 50 MICROGram(s) IV Push every 3 hours PRN Moderate Pain (4 - 6)  heparin  Injectable 79861 Unit(s) IV Push every 6 hours PRN For aPTT less than 40  heparin  Injectable 5000 Unit(s) IV Push every 6 hours PRN For aPTT between 40 - 57  sodium chloride 0.9% lock flush 10 milliLiter(s) IV Push every 1 hour PRN Pre/post blood products, medications, blood draw, and to maintain line patency    Physical Exam:    Neurological: Dilaudid gtt and PRN Fentanyl for sedation / analgesia    Pulmonary: mechanical ventilation; SIMV 10/12/550/10/40%    Cardiovascular: S1, S2, regular rate & rhythm    Gastrointestinal: soft, non-distended    : Benitez in place draining clear, yellow urine    Skin: warm, dry, no diaphoresis, no pallor, cyanosis, or jaundice    LABS:  CBC Full  -  ( 17 Apr 2020 05:26 )  WBC Count : 5.61 K/uL  RBC Count : 2.97 M/uL  Hemoglobin : 8.8 g/dL  Hematocrit : 30.0 %  Platelet Count - Automated : 170 K/uL  Mean Cell Volume : 101.0 fl  Mean Cell Hemoglobin : 29.6 pg  Mean Cell Hemoglobin Concentration : 29.3 gm/dL  Auto Neutrophil # : 3.92 K/uL  Auto Lymphocyte # : 1.24 K/uL  Auto Monocyte # : 0.29 K/uL  Auto Eosinophil # : 0.07 K/uL  Auto Basophil # : 0.03 K/uL  Auto Neutrophil % : 69.9 %  Auto Lymphocyte % : 22.1 %  Auto Monocyte % : 5.2 %  Auto Eosinophil % : 1.2 %  Auto Basophil % : 0.5 %    04-17    156<H>  |  120<H>  |  52.0<H>  ----------------------------<  78  4.1   |  27.0  |  1.83<H>    Ca    7.6<L>      17 Apr 2020 14:23  Phos  3.9     04-17  Mg     2.7     04-17    TPro  5.4<L>  /  Alb  1.9<L>  /  TBili  <0.2<L>  /  DBili  x   /  AST  55<H>  /  ALT  53<H>  /  AlkPhos  62  04-17    PTT - ( 17 Apr 2020 05:32 )  PTT:57.8 sec    RECENT CULTURES:  04-14 .Blood Candida tropicalis  Blood Culture PCR XXXX   Growth in aerobic bottle: Candida tropicalis  Aerobic Bottle: 22:04 Hours to positivity  Anaerobic Bottle: No growth to date  ***Blood Panel PCR results on this specimen are available  approximately 3 hours after the Gram stain result.***  Gram stain, PCR, and/or culture results may not always  correspond due to difference in methodologies.  ************************************************************  This PCR assay was performed using [employer].  The following targets are tested for: Enterococcus,  vancomycin resistant enterococci, Listeria monocytogenes,  coagulase negative staphylococci, S. aureus,  methicillin resistant S. aureus, Streptococcus agalactiae  (Group B), S. pneumoniae, S. pyogenes (Group A),  Acinetobacter baumannii, Enterobacter cloacae, E. coli,  Klebsiella oxytoca, K. pneumoniae, Proteus sp.,  Serratia marcescens, Haemophilus influenzae,  Neisseria meningitidis, Pseudomonas aeruginosa, Candida  albicans, C. glabrata, C krusei, C parapsilosis,  C. tropicalis and the KPC resistance gene.  ,  TYPE: (C=Critical, N=Notification, A=Abnormal) c  TESTS:  _ gs  DATE/TIME CALLED: _ 04/15/2020 16:46:27  CALLED TO: Lloyd clarke rn  READ BACK (2 Patient Identifiers)(Y/N): _ y  READ BACK VALUES (Y/N): _ y  CALLED BY: Lloyd rubin    LIVER FUNCTIONS - ( 17 Apr 2020 14:23 )  Alb: 1.9 g/dL / Pro: 5.4 g/dL / ALK PHOS: 62 U/L / ALT: 53 U/L / AST: 55 U/L / GGT: x

## 2020-04-19 NOTE — PROGRESS NOTE ADULT - ATTENDING COMMENTS
73 yo M with PMH of DM, HTN, presents with ARDS due to COVID 19.  T max 99 and WBC 3.9. Intubated, vent day 17, and sedated. open eyes, MUHAMMAD, follow command, GCS 10T. PF ratio 237.  L CT with small intermittent leak and CXR today show no PTX.  Continue Amio gtt and hep gtt for afib. Obtain Echo to eval cardiac valves  Continue wean vent as tolerated  Continue sedation to enhance vent wean. Clonidine started   Continue TF and add free water for Na 155  Continue to control BG  with lantus and high ISS   Fungemia and fluconazole added to vanc and cefepime  VTE ppx SCD/heparin   Continue ICU care for cardiopulmonary support         code [id number]       CCT 50 min .

## 2020-04-19 NOTE — CHART NOTE - NSCHARTNOTEFT_GEN_A_CORE
Source: Patient [ ]  Family [ ]   other [x ] EMR  74y male COVID + w/ hypoxic respiratory failure. Hospital course complicated by superimposed bacterial PNA and most recently, fungemia.   Pt sedated and on vent support   Current Diet: Diet, NPO with Tube Feed:   Tube Feeding Modality: Orogastric  Glucerna 1.5 Jatinder  Total Volume for 24 Hours (mL): 1200  Continuous  Starting Tube Feed Rate {mL per Hour}: 20     Every 4 hours  Until Goal Tube Feed Rate (mL per Hour): 50  Tube Feed Duration (in Hours): 24  Tube Feed Start Time: 05:45  No Carb Prosource (1pkg = 15gms Protein)     Qty per Day:  1 (04-03-20 @ 05:49)    Enteral /Parenteral Nutrition: If 100% infused TF providing 1200ml total volume 1800kcal, 99 g pro 912 ml free water. However TF is held when pt is proned and 100% has not been infused. Pt may benefit from a bolus regimen      Current Weight:   (5/18) 299 lbs  (5/15)  292.6 lbs   (4/14) 292.7 lbs  (4/13) 290.3 lbs  (4/12) 294.9 lbs  (4/11) 300.9 lbs  (4/10) 303.3 lbs  (4/9) 292.9 lbs  % Weight Change     Pertinent Medications: MEDICATIONS  (STANDING):  ALBUTerol    90 MICROgram(s) HFA Inhaler 6 Puff(s) Inhalation every 6 hours  aMIOdarone    Tablet 200 milliGRAM(s) Oral daily  aspirin  chewable 81 milliGRAM(s) Oral daily  cefepime   IVPB 1000 milliGRAM(s) IV Intermittent every 8 hours  chlorhexidine 0.12% Liquid 15 milliLiter(s) Oral Mucosa every 12 hours  chlorhexidine 2% Cloths 1 Application(s) Topical daily  clonazePAM  Tablet 0.5 milliGRAM(s) Oral every 12 hours  cloNIDine Patch 0.2 mG/24Hr(s) 1 patch Transdermal every 7 days  dextrose 5%. 1000 milliLiter(s) (42 mL/Hr) IV Continuous <Continuous>  fluconAZOLE IVPB 400 milliGRAM(s) IV Intermittent every 24 hours  heparin  Infusion.  Unit(s)/Hr (15 mL/Hr) IV Continuous <Continuous>  HYDROmorphone Infusion 0.25 mG/Hr (0.25 mL/Hr) IV Continuous <Continuous>  insulin glargine Injectable (LANTUS) 40 Unit(s) SubCutaneous every morning  insulin glargine Injectable (LANTUS) 40 Unit(s) SubCutaneous at bedtime  insulin lispro (HumaLOG) corrective regimen sliding scale   SubCutaneous every 6 hours  senna Syrup 10 milliLiter(s) Oral daily    MEDICATIONS  (PRN):  acetaminophen   Tablet .. 650 milliGRAM(s) Oral every 6 hours PRN Temp greater or equal to 38C (100.4F), Mild Pain (1 - 3)  fentaNYL    Injectable 50 MICROGram(s) IV Push every 3 hours PRN Moderate Pain (4 - 6)  heparin  Injectable 52068 Unit(s) IV Push every 6 hours PRN For aPTT less than 40  heparin  Injectable 5000 Unit(s) IV Push every 6 hours PRN For aPTT between 40 - 57  sodium chloride 0.9% lock flush 10 milliLiter(s) IV Push every 1 hour PRN Pre/post blood products, medications, blood draw, and to maintain line patency    Pertinent Labs: CBC Full  -  ( 19 Apr 2020 04:46 )  WBC Count : 3.96 K/uL  RBC Count : 2.43 M/uL  Hemoglobin : 7.2 g/dL  Hematocrit : 25.3 %  Platelet Count - Automated : 171 K/uL  Mean Cell Volume : 104.1 fl  Mean Cell Hemoglobin : 29.6 pg  Mean Cell Hemoglobin Concentration : 28.5 gm/dL  Auto Neutrophil # : 2.48 K/uL  Auto Lymphocyte # : 1.11 K/uL  Auto Monocyte # : 0.27 K/uL  Auto Eosinophil # : 0.03 K/uL  Auto Basophil # : 0.02 K/uL  Auto Neutrophil % : 62.6 %  Auto Lymphocyte % : 28.0 %  Auto Monocyte % : 6.8 %  Auto Eosinophil % : 0.8 %  Auto Basophil % : 0.5 %    04-19 Na155 mmol/L<H> Glu 174 mg/dL<H> K+ 4.7 mmol/L Cr  1.80 mg/dL<H> BUN 45.0 mg/dL<H> Phos 3.7 mg/dL Alb n/a   PAB n/a             Skin: IAD per documentation    Physical Assessment NOT DONE (In accordance to current COVID isolation standards limiting pt contact)     Estimated Needs:   [ x] no change since previous assessment  [ ] recalculated:     Current Nutrition Diagnosis: : Pt remains at nutrition risk secondary to increased nutrient needs (protein) related to increased physiological demand for nutrient as evidenced by hypoxia secondary to COVID-19 requiring intubation. Pt remains intubated/sedated. Continues to receive tube feeds/ TF as ordered meeting estimated needs. . Last BM 4/18 per documentation.     Recommendations:   1) Continue current enteral regimen- If bolus regimen warranted bolus 200ml q 4 hrs.   2) RX: liquid MVI, Vit C, Thiamine , Vit D   3) Daily weights      Monitoring and Evaluation:   [ ] PO intake [x ] Tolerance to diet prescription [X] Weights  [X] Follow up per protocol [X] Labs:

## 2020-04-19 NOTE — PROGRESS NOTE ADULT - SUBJECTIVE AND OBJECTIVE BOX
24h Events: No acute events overnight. Continues to be Hypernatremic to 156. Increased Free water. Doing well on SIMV.     ICU Vital Signs Last 24 Hrs  T(C): 37.7 (19 Apr 2020 04:00), Max: 38.8 (18 Apr 2020 20:00)  T(F): 99.8 (19 Apr 2020 04:00), Max: 101.8 (18 Apr 2020 20:00)  HR: 66 (19 Apr 2020 04:01) (66 - 95)  BP: --  BP(mean): --  ABP: 117/46 (19 Apr 2020 04:01) (109/46 - 170/67)  ABP(mean): 65 (19 Apr 2020 04:01) (64 - 93)  RR: 22 (19 Apr 2020 04:01) (19 - 37)  SpO2: 96% (19 Apr 2020 04:01) (90% - 96%)      I&O's Detail    17 Apr 2020 07:01  -  18 Apr 2020 07:00  --------------------------------------------------------  IN:    dextrose 5%.: 840 mL    Enteral Tube Flush: 200 mL    Free Water: 1800 mL    Glucerna 1.5: 600 mL    heparin  Infusion.: 345 mL    HYDROmorphone  Infusion: 11.5 mL    multiple electrolytes Injection Type 1multiple electrolytes Injection Type 1: 200 mL    Solution: 250 mL    Solution: 50 mL  Total IN: 4296.5 mL    OUT:    Indwelling Catheter - Urethral: 2500 mL  Total OUT: 2500 mL    Total NET: 1796.5 mL      18 Apr 2020 07:01  -  19 Apr 2020 04:39  --------------------------------------------------------  IN:    dextrose 5%.: 840 mL    Enteral Tube Flush: 180 mL    Free Water: 1500 mL    Glucerna 1.5: 1000 mL    heparin  Infusion.: 225 mL    HYDROmorphone  Infusion: 8 mL    Solution: 200 mL    Solution: 50 mL  Total IN: 4003 mL    OUT:    Indwelling Catheter - Urethral: 2385 mL  Total OUT: 2385 mL    Total NET: 1618 mL      ABG - ( 18 Apr 2020 05:17 )  pH, Arterial: 7.40  pH, Blood: x     /  pCO2: 50    /  pO2: 114   / HCO3: 30    / Base Excess: 5.6   /  SaO2: 99          MEDICATIONS  (STANDING):  ALBUTerol    90 MICROgram(s) HFA Inhaler 6 Puff(s) Inhalation every 6 hours  aMIOdarone    Tablet 200 milliGRAM(s) Oral daily  aspirin  chewable 81 milliGRAM(s) Oral daily  cefepime   IVPB 1000 milliGRAM(s) IV Intermittent every 8 hours  chlorhexidine 0.12% Liquid 15 milliLiter(s) Oral Mucosa every 12 hours  chlorhexidine 2% Cloths 1 Application(s) Topical daily  clonazePAM  Tablet 0.5 milliGRAM(s) Oral every 12 hours  cloNIDine Patch 0.2 mG/24Hr(s) 1 patch Transdermal every 7 days  dextrose 5%. 1000 milliLiter(s) (42 mL/Hr) IV Continuous <Continuous>  fluconAZOLE IVPB 400 milliGRAM(s) IV Intermittent every 24 hours  heparin  Infusion.  Unit(s)/Hr (15 mL/Hr) IV Continuous <Continuous>  HYDROmorphone Infusion 0.25 mG/Hr (0.25 mL/Hr) IV Continuous <Continuous>  insulin glargine Injectable (LANTUS) 40 Unit(s) SubCutaneous every morning  insulin glargine Injectable (LANTUS) 40 Unit(s) SubCutaneous at bedtime  insulin lispro (HumaLOG) corrective regimen sliding scale   SubCutaneous every 6 hours  vancomycin  IVPB 1500 milliGRAM(s) IV Intermittent every 24 hours    MEDICATIONS  (PRN):  acetaminophen   Tablet .. 650 milliGRAM(s) Oral every 6 hours PRN Temp greater or equal to 38C (100.4F), Mild Pain (1 - 3)  fentaNYL    Injectable 50 MICROGram(s) IV Push every 3 hours PRN Moderate Pain (4 - 6)  heparin  Injectable 38529 Unit(s) IV Push every 6 hours PRN For aPTT less than 40  heparin  Injectable 5000 Unit(s) IV Push every 6 hours PRN For aPTT between 40 - 57  sodium chloride 0.9% lock flush 10 milliLiter(s) IV Push every 1 hour PRN Pre/post blood products, medications, blood draw, and to maintain line patency        Physical Exam:    Neurological: Dilaudid gtt for sedation / analgesia    Pulmonary: mechanical ventilation; SIMV 6/10/550/14/40%    Cardiovascular: S1, S2, regular rate & rhythm    Gastrointestinal: soft, non-distended, no rebound / guarding    : Benitez in place draining clear, yellow urine    Skin: warm, dry, no diaphoresis, no pallor, cyanosis, or jaundice    LABS:  CBC Full  -  ( 18 Apr 2020 05:32 )  WBC Count : 5.23 K/uL  RBC Count : 2.77 M/uL  Hemoglobin : 8.2 g/dL  Hematocrit : 28.5 %  Platelet Count - Automated : 176 K/uL  Mean Cell Volume : 102.9 fl  Mean Cell Hemoglobin : 29.6 pg  Mean Cell Hemoglobin Concentration : 28.8 gm/dL  Auto Neutrophil # : 3.76 K/uL  Auto Lymphocyte # : 1.00 K/uL  Auto Monocyte # : 0.32 K/uL  Auto Eosinophil # : 0.06 K/uL  Auto Basophil # : 0.03 K/uL  Auto Neutrophil % : 72.0 %  Auto Lymphocyte % : 19.1 %  Auto Monocyte % : 6.1 %  Auto Eosinophil % : 1.1 %  Auto Basophil % : 0.6 %    04-18    156<H>  |  118<H>  |  50.0<H>  ----------------------------<  153<H>  5.0   |  27.0  |  1.87<H>    Ca    8.2<L>      18 Apr 2020 05:32  Phos  4.5     04-18  Mg     2.8     04-18    TPro  5.4<L>  /  Alb  1.9<L>  /  TBili  <0.2<L>  /  DBili  x   /  AST  55<H>  /  ALT  53<H>  /  AlkPhos  62  04-17    PTT - ( 18 Apr 2020 05:32 )  PTT:66.0 sec    RECENT CULTURES:  04-14 .Blood Candida tropicalis  Blood Culture PCR XXXX   Growth in aerobic bottle: Candida tropicalis  Aerobic Bottle: 22:04 Hours to positivity  Anaerobic Bottle: No growth to date  ***Blood Panel PCR results on this specimen are available  approximately 3 hours after the Gram stain result.***  Gram stain, PCR, and/or culture results may not always  correspond due to difference in methodologies.  ************************************************************  This PCR assay was performed using [employer].  The following targets are tested for: Enterococcus,  vancomycin resistant enterococci, Listeria monocytogenes,  coagulase negative staphylococci, S. aureus,  methicillin resistant S. aureus, Streptococcus agalactiae  (Group B), S. pneumoniae, S. pyogenes (Group A),  Acinetobacter baumannii, Enterobacter cloacae, E. coli,  Klebsiella oxytoca, K. pneumoniae, Proteus sp.,  Serratia marcescens, Haemophilus influenzae,  Neisseria meningitidis, Pseudomonas aeruginosa, Candida  albicans, C. glabrata, C krusei, C parapsilosis,  C. tropicalis and the KPC resistance gene.  ,  TYPE: (C=Critical, N=Notification, A=Abnormal) c  TESTS:  _ gs  DATE/TIME CALLED: _ 04/15/2020 16:46:27  CALLED TO: Lloyd clarke rn  READ BACK (2 Patient Identifiers)(Y/N): _ y  READ BACK VALUES (Y/N): _ y  CALLED BY: Lloyd rubin    LIVER FUNCTIONS - ( 17 Apr 2020 14:23 )  Alb: 1.9 g/dL / Pro: 5.4 g/dL / ALK PHOS: 62 U/L / ALT: 53 U/L / AST: 55 U/L / GGT: x             ASSESSMENT/PLAN: 74y male COVID + w/ hypoxic respiratory failure. Hospital course complicated by superimposed bacterial PNA and most recently, fungemia.     Neuro: continue analgesia w/ dilaudid gtt @ current rate, PRN Fentanyl, Klonopin BID, delirium precautions, daily sedation holiday    CV: maintain MAPS > 65, PO amiodarone, continue invasive monitoring with arterial line    Pulm: P/F improved; wean FiO2 / PEEP as tolerated on SIMV    GI/Nutrition: NPO with tube feeds - hold feeds if prone. Continue IVF, free H20 increased to 300 q4h for worsening hyperNa    /Renal: Benitez for strict I&O, monitor kidney fxn, Cr appears to be plateauing at approx. 1.7-1.8.    ID: Continue Micafungin, Continue Cefepime & Vancomycin for superimposed bacterial PNA to end 4/19    Endo: blood glucose downtrending, Lantus 40u and continue remainder of diabetic regimen     Skin: repositioning for DTI prevention while in bed    Heme/DVT Prophylaxis: SCDs / heparin gtt for hypercoagulable state, goal PTT 58-99

## 2020-04-20 NOTE — PROGRESS NOTE ADULT - SUBJECTIVE AND OBJECTIVE BOX
INTERVAL HPI/OVERNIGHT EVENTS/SUBJECTIVE: Had ET  tube advanced for poor placement given monica as paralytic had to adjust vent. tolerated well. Pulling good volumes. PF this am 198    ICU Vital Signs Last 24 Hrs  T(C): 38.6 (20 Apr 2020 07:00), Max: 38.6 (20 Apr 2020 07:00)  T(F): 101.5 (20 Apr 2020 07:00), Max: 101.5 (20 Apr 2020 07:00)  HR: 60 (20 Apr 2020 09:35) (56 - 66)  BP: --  BP(mean): --  ABP: 119/57 (20 Apr 2020 08:00) (115/43 - 140/55)  ABP(mean): 73 (20 Apr 2020 08:00) (62 - 77)  RR: 981 (20 Apr 2020 08:00) (15 - 981)  SpO2: 97% (20 Apr 2020 09:35) (94% - 100%)      I&O's Detail    19 Apr 2020 07:01  -  20 Apr 2020 07:00  --------------------------------------------------------  IN:    dextrose 5%.: 504 mL    Free Water: 900 mL    Glucerna 1.5: 600 mL    heparin  Infusion.: 180 mL    HYDROmorphone  Infusion: 6 mL  Total IN: 2190 mL    OUT:    Indwelling Catheter - Urethral: 1775 mL  Total OUT: 1775 mL    Total NET: 415 mL      20 Apr 2020 07:01  -  20 Apr 2020 11:08  --------------------------------------------------------  IN:    dextrose 5%.: 168 mL    Glucerna 1.5: 200 mL    heparin  Infusion.: 60 mL    HYDROmorphone  Infusion: 2 mL  Total IN: 430 mL    OUT:    Indwelling Catheter - Urethral: 130 mL  Total OUT: 130 mL    Total NET: 300 mL          Mode: AC/ CMV (Assist Control/ Continuous Mandatory Ventilation)  RR (machine): 22  TV (machine): 500  FiO2: 60  PEEP: 10  ITime: 0.8  MAP: 16  PIP: 29    ABG - ( 20 Apr 2020 04:41 )  pH, Arterial: 7.50  pH, Blood: x     /  pCO2: 39    /  pO2: 119   / HCO3: 30    / Base Excess: 6.5   /  SaO2: 99                  MEDICATIONS  (STANDING):  ALBUTerol    90 MICROgram(s) HFA Inhaler 6 Puff(s) Inhalation every 6 hours  aMIOdarone    Tablet 200 milliGRAM(s) Oral daily  aspirin  chewable 81 milliGRAM(s) Oral daily  chlorhexidine 0.12% Liquid 15 milliLiter(s) Oral Mucosa every 12 hours  chlorhexidine 2% Cloths 1 Application(s) Topical daily  clonazePAM  Tablet 0.5 milliGRAM(s) Oral every 12 hours  cloNIDine Patch 0.2 mG/24Hr(s) 1 patch Transdermal every 7 days  dextrose 5%. 1000 milliLiter(s) (42 mL/Hr) IV Continuous <Continuous>  fluconAZOLE IVPB 400 milliGRAM(s) IV Intermittent every 24 hours  heparin  Infusion.  Unit(s)/Hr (15 mL/Hr) IV Continuous <Continuous>  HYDROmorphone Infusion 0.25 mG/Hr (0.25 mL/Hr) IV Continuous <Continuous>  insulin glargine Injectable (LANTUS) 40 Unit(s) SubCutaneous every morning  insulin glargine Injectable (LANTUS) 40 Unit(s) SubCutaneous at bedtime  insulin lispro (HumaLOG) corrective regimen sliding scale   SubCutaneous every 6 hours  senna Syrup 10 milliLiter(s) Oral daily    MEDICATIONS  (PRN):  acetaminophen   Tablet .. 650 milliGRAM(s) Oral every 6 hours PRN Temp greater or equal to 38C (100.4F), Mild Pain (1 - 3)  fentaNYL    Injectable 50 MICROGram(s) IV Push every 3 hours PRN Moderate Pain (4 - 6)  heparin  Injectable 91264 Unit(s) IV Push every 6 hours PRN For aPTT less than 40  heparin  Injectable 5000 Unit(s) IV Push every 6 hours PRN For aPTT between 40 - 57  sodium chloride 0.9% lock flush 10 milliLiter(s) IV Push every 1 hour PRN Pre/post blood products, medications, blood draw, and to maintain line patency      NUTRITION/IVF: TF at goal IVL     CENTRAL LINE:  yes     AVITIA:   yes     A-LINE:   yes     CHEST TUBE: no  NG/OG TUBE: OGT :    MISC:     Physical Exam:    Neurological: Dilaudid gtt for sedation / analgesia    Pulmonary: mechanical ventilation; SIMV 22/500/12/60%    Cardiovascular: S1, S2, regular rate & rhythm    Gastrointestinal: soft, non-distended, no rebound / guarding    : Avitia in place draining clear, yellow urine    Skin: warm, dry, no diaphoresis, no pallor, cyanosis, or jaund        LABS:  CBC Full  -  ( 20 Apr 2020 05:14 )  WBC Count : 3.51 K/uL  RBC Count : 2.38 M/uL  Hemoglobin : 7.0 g/dL  Hematocrit : 24.3 %  Platelet Count - Automated : 180 K/uL  Mean Cell Volume : 102.1 fl  Mean Cell Hemoglobin : 29.4 pg  Mean Cell Hemoglobin Concentration : 28.8 gm/dL  Auto Neutrophil # : 2.51 K/uL  Auto Lymphocyte # : 0.78 K/uL  Auto Monocyte # : 0.08 K/uL  Auto Eosinophil # : 0.06 K/uL  Auto Basophil # : 0.06 K/uL  Auto Neutrophil % : 55.5 %  Auto Lymphocyte % : 22.2 %  Auto Monocyte % : 2.4 %  Auto Eosinophil % : 1.6 %  Auto Basophil % : 1.6 %    04-20    151<H>  |  114<H>  |  44.0<H>  ----------------------------<  221<H>  4.9   |  27.0  |  1.73<H>    Ca    7.9<L>      20 Apr 2020 05:14  Phos  3.7     04-20  Mg     2.4     04-20      PTT - ( 20 Apr 2020 05:14 )  PTT:61.0 sec    RECENT CULTURES:  04-14 .Blood Candida tropicalis  Blood Culture PCR XXXX   Growth in aerobic bottle: Candida tropicalis  Aerobic Bottle: 22:04 Hours to positivity  Anaerobic Bottle: No growth at 5 days.  ***Blood Panel PCR results on this specimen are available  approximately 3 hours after the Gram stain result.***  Gram stain, PCR, and/or culture results may not always  correspond due to difference in methodologies.  ************************************************************  This PCR assay was performed using [email or web address].  The following targets are tested for: Enterococcus,  vancomycin resistant enterococci, Listeria monocytogenes,  coagulase negative staphylococci, S. aureus,  methicillin resistant S. aureus, Streptococcus agalactiae  (Group B), S. pneumoniae, S. pyogenes (Group A),  Acinetobacter baumannii, Enterobacter cloacae, E. coli,  Klebsiella oxytoca, K. pneumoniae, Proteus sp.,  Serratia marcescens, Haemophilus influenzae,  Neisseria meningitidis, Pseudomonas aeruginosa, Candida  albicans, C. glabrata, C krusei, C parapsilosis,  C. tropicalis and the KPC resistance gene.  ,  TYPE: (C=Critical, N=Notification, A=Abnormal) c  TESTS:  _ gs  DATE/TIME CALLED: _ 04/15/2020 16:46:27  CALLED TO: Lloyd clarke rn  READ BACK (2 Patient Identifiers)(Y/N): _ y  READ BACK VALUES (Y/N): _ y  CALLED BY: Lloyd rubin          CARDIAC MARKERS ( 20 Apr 2020 05:14 )  x     / 0.08 ng/mL / 577 U/L / x     / 2.9 ng/mL      CAPILLARY BLOOD GLUCOSE      RADIOLOGY & ADDITIONAL STUDIES:    ASSESSMENT/PLAN:  74yMale presenting with:    Neuro:    HEENT:    CV:    Pulm:    GI/Nutrition:    /Renal:    ID:    Lines/Tubes:    Endo:    Skin:    Proph:    Dispo:      CRITICAL CARE TIME SPENT:

## 2020-04-20 NOTE — CHART NOTE - NSCHARTNOTEFT_GEN_A_CORE
Spoke to patient's son Claudio to provide daily update on patient's condition. All questions were answered and concerns were addressed.

## 2020-04-20 NOTE — PROGRESS NOTE ADULT - ATTENDING COMMENTS
I have seen and examined the patient.  Wean sedation, follows simple commons, treat delirium  ventilation and oxygenation slowly improving. P:F 199.  transition to PSV,   off pressors, perfusion adequate.  renal function adequate, free water deficit being slowly corrected.  Conf fluconazole for candidemia.

## 2020-04-21 NOTE — PROGRESS NOTE ADULT - SUBJECTIVE AND OBJECTIVE BOX
24h Events:  no acute events or vent changes overnight   p/f improving    ICU Vital Signs Last 24 Hrs  T(C): 37.8 (20 Apr 2020 23:39), Max: 38.6 (20 Apr 2020 07:00)  T(F): 100 (20 Apr 2020 23:39), Max: 101.5 (20 Apr 2020 07:00)  HR: 54 (21 Apr 2020 00:00) (54 - 66)  BP: 113/53 (20 Apr 2020 16:00) (113/53 - 113/53)  BP(mean): 77 (20 Apr 2020 16:00) (77 - 77)  ABP: 119/44 (21 Apr 2020 00:00) (111/43 - 134/47)  ABP(mean): 62 (21 Apr 2020 00:00) (61 - 77)  RR: 21 (21 Apr 2020 00:00) (21 - 981)  SpO2: 96% (21 Apr 2020 00:00) (96% - 100%)      I&O's Detail    19 Apr 2020 07:01  -  20 Apr 2020 07:00  --------------------------------------------------------  IN:    dextrose 5%.: 504 mL    Free Water: 900 mL    Glucerna 1.5: 600 mL    heparin  Infusion.: 180 mL    HYDROmorphone  Infusion: 6 mL  Total IN: 2190 mL    OUT:    Indwelling Catheter - Urethral: 1775 mL  Total OUT: 1775 mL    Total NET: 415 mL      20 Apr 2020 07:01  -  21 Apr 2020 02:52  --------------------------------------------------------  IN:    dextrose 5%.: 840 mL    Free Water: 900 mL    Glucerna 1.5: 1000 mL    heparin  Infusion.: 300 mL    HYDROmorphone  Infusion: 7.7 mL  Total IN: 3047.7 mL    OUT:    Indwelling Catheter - Urethral: 1680 mL  Total OUT: 1680 mL    Total NET: 1367.7 mL          ABG - ( 20 Apr 2020 04:41 )  pH, Arterial: 7.50  pH, Blood: x     /  pCO2: 39    /  pO2: 119   / HCO3: 30    / Base Excess: 6.5   /  SaO2: 99                  MEDICATIONS  (STANDING):  ALBUTerol    90 MICROgram(s) HFA Inhaler 6 Puff(s) Inhalation every 6 hours  aMIOdarone    Tablet 200 milliGRAM(s) Oral daily  aspirin  chewable 81 milliGRAM(s) Oral daily  chlorhexidine 0.12% Liquid 15 milliLiter(s) Oral Mucosa every 12 hours  chlorhexidine 2% Cloths 1 Application(s) Topical daily  clonazePAM  Tablet 0.5 milliGRAM(s) Oral every 12 hours  cloNIDine Patch 0.2 mG/24Hr(s) 1 patch Transdermal every 7 days  dextrose 5%. 1000 milliLiter(s) (42 mL/Hr) IV Continuous <Continuous>  fluconAZOLE IVPB 400 milliGRAM(s) IV Intermittent every 24 hours  heparin  Infusion.  Unit(s)/Hr (15 mL/Hr) IV Continuous <Continuous>  HYDROmorphone Infusion 0.25 mG/Hr (0.25 mL/Hr) IV Continuous <Continuous>  insulin glargine Injectable (LANTUS) 40 Unit(s) SubCutaneous every morning  insulin glargine Injectable (LANTUS) 40 Unit(s) SubCutaneous at bedtime  insulin lispro (HumaLOG) corrective regimen sliding scale   SubCutaneous every 6 hours  senna Syrup 10 milliLiter(s) Oral daily    MEDICATIONS  (PRN):  acetaminophen   Tablet .. 650 milliGRAM(s) Oral every 6 hours PRN Temp greater or equal to 38C (100.4F), Mild Pain (1 - 3)  fentaNYL    Injectable 50 MICROGram(s) IV Push every 3 hours PRN Moderate Pain (4 - 6)  heparin  Injectable 84788 Unit(s) IV Push every 6 hours PRN For aPTT less than 40  heparin  Injectable 5000 Unit(s) IV Push every 6 hours PRN For aPTT between 40 - 57  sodium chloride 0.9% lock flush 10 milliLiter(s) IV Push every 1 hour PRN Pre/post blood products, medications, blood draw, and to maintain line patency        Physical Exam:    Neurological: dilaudid gtt for sedation / analgesia. follows commands, opens eyes     Pulmonary: mechanical ventilation; PS 10/8 , 40%    Cardiovascular: S1, S2, regular rate & rhythm    Gastrointestinal: soft, non-tender, non-distended, no rebound / guarding    : brown in place draining yellow urine    Skin: warm, dry, no diaphoresis, no pallor, cyanosis, or jaundice    LABS:  CBC Full  -  ( 20 Apr 2020 05:14 )  WBC Count : 3.51 K/uL  RBC Count : 2.38 M/uL  Hemoglobin : 7.0 g/dL  Hematocrit : 24.3 %  Platelet Count - Automated : 180 K/uL  Mean Cell Volume : 102.1 fl  Mean Cell Hemoglobin : 29.4 pg  Mean Cell Hemoglobin Concentration : 28.8 gm/dL  Auto Neutrophil # : 2.51 K/uL  Auto Lymphocyte # : 0.78 K/uL  Auto Monocyte # : 0.08 K/uL  Auto Eosinophil # : 0.06 K/uL  Auto Basophil # : 0.06 K/uL  Auto Neutrophil % : 55.5 %  Auto Lymphocyte % : 22.2 %  Auto Monocyte % : 2.4 %  Auto Eosinophil % : 1.6 %  Auto Basophil % : 1.6 %    04-20    151<H>  |  114<H>  |  44.0<H>  ----------------------------<  221<H>  4.9   |  27.0  |  1.73<H>    Ca    7.9<L>      20 Apr 2020 05:14  Phos  3.7     04-20  Mg     2.4     04-20      PTT - ( 20 Apr 2020 05:14 )  PTT:61.0 sec    RECENT CULTURES:  04-18 .Blood Blood XXXX XXXX   No growth at 48 hours    04-14 .Blood Candida tropicalis  Blood Culture PCR XXXX   Growth in aerobic bottle: Candida tropicalis  Aerobic Bottle: 22:04 Hours to positivity  Anaerobic Bottle: No growth at 5 days.  ***Blood Panel PCR results on this specimen are available  approximately 3 hours after the Gram stain result.***  Gram stain, PCR, and/or culture results may not always  correspond due to difference in methodologies.  ************************************************************  This PCR assay was performed using [facility] ([facility]).  The following targets are tested for: Enterococcus,  vancomycin resistant enterococci, Listeria monocytogenes,  coagulase negative staphylococci, S. aureus,  methicillin resistant S. aureus, Streptococcus agalactiae  (Group B), S. pneumoniae, S. pyogenes (Group A),  Acinetobacter baumannii, Enterobacter cloacae, E. coli,  Klebsiella oxytoca, K. pneumoniae, Proteus sp.,  Serratia marcescens, Haemophilus influenzae,  Neisseria meningitidis, Pseudomonas aeruginosa, Candida  albicans, C. glabrata, C krusei, C parapsilosis,  C. tropicalis and the KPC resistance gene.  ,  TYPE: (C=Critical, N=Notification, A=Abnormal) c  TESTS:  _ gs  DATE/TIME CALLED: _ 04/15/2020 16:46:27  CALLED TO: Lloyd clarke rn  READ BACK (2 Patient Identifiers)(Y/N): _ y  READ BACK VALUES (Y/N): _ y  CALLED BY: Lloyd rubin          CARDIAC MARKERS ( 20 Apr 2020 05:14 )  x     / 0.08 ng/mL / 577 U/L / x     / 2.9 ng/mL          ASSESSMENT/PLAN:  74y Male COVID + w/ hypoxic respiratory failure. Hospital course complicated by superimposed bacterial PNA and most recently, fungemia.     Neuro: sedation / analgesia with dilaudid, delirium precautions, daily sedation holiday    CV: continue invasive monitoring with arterial line    Pulm: wean FiO2 / PEEP aas tolerated    GI/Nutrition: NPO with tube feeds    /Renal: brown for strict I&O, monitor kidney fxn    ID:  fluconazole, daily blood cultures . cefipime and vanco    Endo: monitor blood glucose    Skin: repositioning for DTI prevention while in bed    Heme/DVT Prophylaxis: SCDs , heparin gtt - monitor ptt

## 2020-04-21 NOTE — PROGRESS NOTE ADULT - ATTENDING COMMENTS
The patient was seen and examined  Events noted  No new problems    Neurologic:  GCS=10T, follows commands  Respiratory:  PSV 10, PEEP+8, 40%; ZTT=101  Hemodynamic:  Normal  Renal:  Urine flow adequate  GI:  TEN  Hematologic:  Hgb okay  ID:  on fluconazole for candida bacteremia    Plan:  The patient's mental status and generalized weakness make extubation risky  Will continue PSV  Nutritional support  Anticoagulation  Low dose steroids in preparation for extubation in next 24-48 hours

## 2020-04-22 NOTE — PROGRESS NOTE ADULT - ATTENDING COMMENTS
The patient was seen and examined  Events noted  No new problems    Neurologic:  GCS=10-11T  Respiratory:  SIMV 60% PS15, PEEP+8; AKZ=534  Hemodynamic:  Normal  Renal: Urine flow okay  GI:  TEN  Hematologic:  Hgb okay  ID:  Hvor=875.3,     Plan:  Will check procalcitonin  Wean IMV rate  Nutritional support  Anticoagulation

## 2020-04-22 NOTE — PROGRESS NOTE ADULT - SUBJECTIVE AND OBJECTIVE BOX
24h Events:  transitioned from pressure support to SIMV during day yesterday    ICU Vital Signs Last 24 Hrs  T(C): 37.7 (21 Apr 2020 23:26), Max: 38.5 (21 Apr 2020 11:31)  T(F): 99.9 (21 Apr 2020 23:26), Max: 101.3 (21 Apr 2020 11:31)  HR: 59 (22 Apr 2020 00:00) (58 - 68)  BP: --  BP(mean): --  ABP: 134/54 (22 Apr 2020 00:00) (112/44 - 190/63)  ABP(mean): 75 (22 Apr 2020 00:00) (63 - 96)  RR: 17 (22 Apr 2020 00:00) (17 - 29)  SpO2: 99% (22 Apr 2020 00:00) (95% - 100%)      I&O's Detail    20 Apr 2020 07:01  -  21 Apr 2020 07:00  --------------------------------------------------------  IN:    dextrose 5%.: 1008 mL    Free Water: 1200 mL    Glucerna 1.5: 1200 mL    heparin  Infusion.: 366 mL    HYDROmorphone  Infusion: 8.9 mL  Total IN: 3782.9 mL    OUT:    Indwelling Catheter - Urethral: 2055 mL  Total OUT: 2055 mL    Total NET: 1727.9 mL      21 Apr 2020 07:01  -  22 Apr 2020 02:02  --------------------------------------------------------  IN:    Free Water: 300 mL    Glucerna 1.5: 800 mL    heparin  Infusion.: 306 mL    HYDROmorphone  Infusion: 5 mL    Solution: 100 mL  Total IN: 1511 mL    OUT:    Indwelling Catheter - Urethral: 1175 mL  Total OUT: 1175 mL    Total NET: 336 mL          ABG - ( 21 Apr 2020 04:47 )  pH, Arterial: 7.44  pH, Blood: x     /  pCO2: 45    /  pO2: 168   / HCO3: 30    / Base Excess: 5.6   /  SaO2: x                   MEDICATIONS  (STANDING):  ALBUTerol    90 MICROgram(s) HFA Inhaler 6 Puff(s) Inhalation every 6 hours  aMIOdarone    Tablet 200 milliGRAM(s) Oral daily  aspirin  chewable 81 milliGRAM(s) Oral daily  chlorhexidine 0.12% Liquid 15 milliLiter(s) Oral Mucosa every 12 hours  chlorhexidine 2% Cloths 1 Application(s) Topical daily  clonazePAM  Tablet 0.5 milliGRAM(s) Oral every 12 hours  cloNIDine Patch 0.2 mG/24Hr(s) 1 patch Transdermal every 7 days  fluconAZOLE IVPB 400 milliGRAM(s) IV Intermittent every 24 hours  heparin  Infusion.  Unit(s)/Hr (15 mL/Hr) IV Continuous <Continuous>  HYDROmorphone Infusion 0.25 mG/Hr (0.25 mL/Hr) IV Continuous <Continuous>  insulin glargine Injectable (LANTUS) 40 Unit(s) SubCutaneous every morning  insulin glargine Injectable (LANTUS) 40 Unit(s) SubCutaneous at bedtime  insulin lispro (HumaLOG) corrective regimen sliding scale   SubCutaneous every 6 hours  senna Syrup 10 milliLiter(s) Oral daily    MEDICATIONS  (PRN):  acetaminophen   Tablet .. 650 milliGRAM(s) Oral every 6 hours PRN Temp greater or equal to 38C (100.4F), Mild Pain (1 - 3)  fentaNYL    Injectable 50 MICROGram(s) IV Push every 3 hours PRN Moderate Pain (4 - 6)  heparin  Injectable 31202 Unit(s) IV Push every 6 hours PRN For aPTT less than 40  heparin  Injectable 5000 Unit(s) IV Push every 6 hours PRN For aPTT between 40 - 57  sodium chloride 0.9% lock flush 10 milliLiter(s) IV Push every 1 hour PRN Pre/post blood products, medications, blood draw, and to maintain line patency        Physical Exam:    Neurological: dilaudid gtt for sedation / analgesia.  grimaces to painful stimuli    Pulmonary: mechanical ventilation; SIMV 10/500/ps15/+8/60%    Cardiovascular: S1, S2, regular rate & rhythm    Gastrointestinal: soft, non-tender, non-distended, no rebound / guarding    : brown in place     Skin: warm, dry, no diaphoresis, no pallor, cyanosis, or jaundice    LABS:  CBC Full  -  ( 21 Apr 2020 04:32 )  WBC Count : 4.14 K/uL  RBC Count : 2.52 M/uL  Hemoglobin : 7.4 g/dL  Hematocrit : 25.6 %  Platelet Count - Automated : 214 K/uL  Mean Cell Volume : 101.6 fl  Mean Cell Hemoglobin : 29.4 pg  Mean Cell Hemoglobin Concentration : 28.9 gm/dL  Auto Neutrophil # : 2.77 K/uL  Auto Lymphocyte # : 0.86 K/uL  Auto Monocyte # : 0.33 K/uL  Auto Eosinophil # : 0.03 K/uL  Auto Basophil # : 0.01 K/uL  Auto Neutrophil % : 66.9 %  Auto Lymphocyte % : 20.8 %  Auto Monocyte % : 8.0 %  Auto Eosinophil % : 0.7 %  Auto Basophil % : 0.2 %    04-21    148<H>  |  110<H>  |  41.0<H>  ----------------------------<  223<H>  5.2   |  27.0  |  1.61<H>    Ca    8.0<L>      21 Apr 2020 04:32  Phos  3.7     04-21  Mg     2.3     04-21      PTT - ( 21 Apr 2020 19:18 )  PTT:86.7 sec    RECENT CULTURES:  04-18 .Blood Blood XXXX XXXX   No growth at 48 hours          CARDIAC MARKERS ( 20 Apr 2020 05:14 )  x     / 0.08 ng/mL / 577 U/L / x     / 2.9 ng/mL          ASSESSMENT/PLAN:  74y Male 74y Male COVID + w/ hypoxic respiratory failure. Hospital course complicated by superimposed bacterial PNA and most recently, fungemia.     Neuro: sedation / analgesia with dilaudid, delirium precautions, daily sedation holiday    CV: continue invasive monitoring with arterial line    Pulm: wean FiO2 / PEEP aas tolerated    GI/Nutrition: NPO with tube feeds    /Renal: brown for strict I&O, monitor kidney fxn    ID:  fluconazole, daily blood cultures .    Endo: monitor blood glucose. Low dose steroids in preparation for extubation in next 24-48 hours .     Skin: repositioning for DTI prevention while in bed    Heme/DVT Prophylaxis: SCDs , heparin gtt - monitor ptt

## 2020-04-23 NOTE — PROGRESS NOTE ADULT - ATTENDING COMMENTS
75 yo M with PMH of DM, HTN, presents with ARDS due to COVID 19.  Intubated, vented, and sedated. open eyes, MUHAMMAD, follow command, GCS 10T. PF ratio 127.  L CT with small intermittent leak .  Continue Amio and hep gtt for afib.   Continue wean vent as tolerated.  Continue sedation to enhance vent wean.  Continue TF and free water -hyponatremia resolving with Na 146  Continue to control BG  with lantus and high ISS   Fungemia and continue fluconazole, vanc and cefepime  VTE ppx SCD/heparin   Continue ICU care for cardiopulmonary support         code [id number]       CCT 50 min .

## 2020-04-23 NOTE — PROGRESS NOTE ADULT - SUBJECTIVE AND OBJECTIVE BOX
24h Events: No acute events overnight. PFR improved yesterday. Transitioned from SIMV to PS 15/8/40% last night and continued to pull TV of 400-500 CC. Switched to propofol for short acting sedation.      ICU Vital Signs Last 24 Hrs  T(C): 38.2 (22 Apr 2020 23:33), Max: 38.2 (22 Apr 2020 23:33)  T(F): 100.7 (22 Apr 2020 23:33), Max: 100.7 (22 Apr 2020 23:33)  HR: 71 (23 Apr 2020 00:00) (59 - 75)  BP: --  BP(mean): --  ABP: 151/50 (23 Apr 2020 00:00) (137/53 - 172/59)  ABP(mean): 75 (23 Apr 2020 00:00) (73 - 89)  RR: 31 (23 Apr 2020 00:00) (19 - 32)  SpO2: 92% (23 Apr 2020 00:00) (90% - 98%)      I&O's Detail    21 Apr 2020 07:01  -  22 Apr 2020 07:00  --------------------------------------------------------  IN:    Free Water: 300 mL    Glucerna 1.5: 1100 mL    heparin  Infusion.: 378 mL    HYDROmorphone  Infusion: 6.2 mL    Solution: 100 mL  Total IN: 1884.2 mL    OUT:    Indwelling Catheter - Urethral: 1375 mL  Total OUT: 1375 mL    Total NET: 509.2 mL      22 Apr 2020 07:01  -  23 Apr 2020 00:56  --------------------------------------------------------  IN:    Enteral Tube Flush: 120 mL    Free Water: 825 mL    Glucerna 1.5: 800 mL    heparin  Infusion.: 198 mL    HYDROmorphone  Infusion: 1 mL    propofol Infusion: 86 mL  Total IN: 2030 mL    OUT:    Indwelling Catheter - Urethral: 2480 mL  Total OUT: 2480 mL    Total NET: -450 mL    ABG - ( 22 Apr 2020 14:46 )  pH, Arterial: 7.41  pH, Blood: x     /  pCO2: 49    /  pO2: 114   / HCO3: 30    / Base Excess: 5.7   /  SaO2: 99          MEDICATIONS  (STANDING):  ALBUTerol    90 MICROgram(s) HFA Inhaler 6 Puff(s) Inhalation every 6 hours  aMIOdarone    Tablet 200 milliGRAM(s) Oral daily  aspirin  chewable 81 milliGRAM(s) Oral daily  chlorhexidine 0.12% Liquid 15 milliLiter(s) Oral Mucosa every 12 hours  chlorhexidine 2% Cloths 1 Application(s) Topical daily  clonazePAM  Tablet 0.5 milliGRAM(s) Oral every 12 hours  cloNIDine Patch 0.2 mG/24Hr(s) 1 patch Transdermal every 7 days  fluconAZOLE IVPB 400 milliGRAM(s) IV Intermittent every 24 hours  heparin  Infusion.  Unit(s)/Hr (15 mL/Hr) IV Continuous <Continuous>  insulin glargine Injectable (LANTUS) 40 Unit(s) SubCutaneous every morning  insulin glargine Injectable (LANTUS) 40 Unit(s) SubCutaneous at bedtime  insulin lispro (HumaLOG) corrective regimen sliding scale   SubCutaneous every 6 hours  propofol Infusion 10 MICROgram(s)/kG/Min (7.54 mL/Hr) IV Continuous <Continuous>  senna Syrup 10 milliLiter(s) Oral daily    MEDICATIONS  (PRN):  acetaminophen   Tablet .. 650 milliGRAM(s) Oral every 6 hours PRN Temp greater or equal to 38C (100.4F), Mild Pain (1 - 3)  heparin  Injectable 09171 Unit(s) IV Push every 6 hours PRN For aPTT less than 40  heparin  Injectable 5000 Unit(s) IV Push every 6 hours PRN For aPTT between 40 - 57  sodium chloride 0.9% lock flush 10 milliLiter(s) IV Push every 1 hour PRN Pre/post blood products, medications, blood draw, and to maintain line patency      Physical Exam:    Neurological: propofol & clonidine patch for sedation / analgesia    Pulmonary: mechanical ventilation; PS 15/8/40%    Cardiovascular: S1, S2, regular rate & rhythm    Gastrointestinal: soft, non-distended    : Benitez in place draining clear, yellow urine    Skin: warm, dry, no diaphoresis, no pallor, cyanosis, or jaundice    LABS:  CBC Full  -  ( 22 Apr 2020 04:48 )  WBC Count : 4.60 K/uL  RBC Count : 2.40 M/uL  Hemoglobin : 7.3 g/dL  Hematocrit : 24.8 %  Platelet Count - Automated : 227 K/uL  Mean Cell Volume : 103.3 fl  Mean Cell Hemoglobin : 30.4 pg  Mean Cell Hemoglobin Concentration : 29.4 gm/dL  Auto Neutrophil # : 2.88 K/uL  Auto Lymphocyte # : 1.13 K/uL  Auto Monocyte # : 0.35 K/uL  Auto Eosinophil # : 0.04 K/uL  Auto Basophil # : 0.03 K/uL  Auto Neutrophil % : 62.5 %  Auto Lymphocyte % : 24.6 %  Auto Monocyte % : 7.6 %  Auto Eosinophil % : 0.9 %  Auto Basophil % : 0.7 %    04-22    148<H>  |  110<H>  |  37.0<H>  ----------------------------<  214<H>  5.0   |  28.0  |  1.49<H>    Ca    8.1<L>      22 Apr 2020 04:48  Phos  3.8     04-22  Mg     2.2     04-22    TPro  5.9<L>  /  Alb  2.0<L>  /  TBili  <0.2<L>  /  DBili  x   /  AST  36  /  ALT  32  /  AlkPhos  60  04-22    PTT - ( 22 Apr 2020 04:48 )  PTT:81.9 sec    RECENT CULTURES:  04-20 .Blood Blood XXXX XXXX   No growth at 48 hours    04-18 .Blood Blood XXXX XXXX   No growth at 48 hours        LIVER FUNCTIONS - ( 22 Apr 2020 04:48 )  Alb: 2.0 g/dL / Pro: 5.9 g/dL / ALK PHOS: 60 U/L / ALT: 32 U/L / AST: 36 U/L / GGT: x             ASSESSMENT/PLAN: 74y Male COVID + w/ hypoxic respiratory failure. Hospital course complicated by superimposed bacterial PNA and most recently, fungemia.     Neuro: sedation / analgesia with propofol and clonidine patch, delirium precautions    CV: continue invasive monitoring with arterial line, continue amiodarone     Pulm: Wean PS as tolerated    GI/Nutrition: NPO with tube feeds at goal    /Renal: Benitez for strict I&O, Continue to trend Cr. and electrolytes    ID: Fluconazole    Endo: monitor blood glucose, ISS, Lantus 40 units every morning and night    Skin: repositioning for DTI prevention while in bed    Heme/DVT Prophylaxis: SCDs / heparin gtt

## 2020-04-23 NOTE — CHART NOTE - NSCHARTNOTEFT_GEN_A_CORE
Source: Patient [ ]  Family [ ]   other [x ]    Current Diet: Diet, NPO with Tube Feed:   Tube Feeding Modality: Orogastric  Glucerna 1.5 Jatinder  Total Volume for 24 Hours (mL): 1200  Continuous  Starting Tube Feed Rate {mL per Hour}: 20     Every 4 hours  Until Goal Tube Feed Rate (mL per Hour): 50  Tube Feed Duration (in Hours): 24  Tube Feed Start Time: 05:45  No Carb Prosource (1pkg = 15gms Protein)     Qty per Day:  1 (04-03-20 @ 05:49)    Enteral /Parenteral Nutrition: Glucerna 1.5, goal rate of 50 ml/hr (x20 hr) + Prostat 30 ml once daily provides: 1030 ml, 1600 kcal, 98g protein, 759 ml free water, and 100% of RDIs for vitamins/minerals. Receiving 300 ml free water q6 hrs.     Current Weight:   (4/23) 305.1 lbs  (4/22) 308.6 lbs  (4/21) 301.3 lbs  (4/18) 301.1 lbs  (4/15) 294 lbs  (4/14) 292.7 lbs  (4/13) 290.3 lbs  (4/12) 294.9 lbs  (4/11) 300.9 lbs  (4/10) 303.3 lbs  (4/9) 292.9 lbs  (4/7) 272.4 lbs  (4/4) 276.9 lbs  (4/3) 276.9 lbs  ? accuracy of weights, noted with 2+ generalized and 3+ dependent edema, continue to trend and maintain strict Is&Os     Pertinent Medications: MEDICATIONS  (STANDING):  ALBUTerol    90 MICROgram(s) HFA Inhaler 6 Puff(s) Inhalation every 6 hours  aMIOdarone    Tablet 200 milliGRAM(s) Oral daily  aspirin  chewable 81 milliGRAM(s) Oral daily  chlorhexidine 0.12% Liquid 15 milliLiter(s) Oral Mucosa every 12 hours  chlorhexidine 2% Cloths 1 Application(s) Topical daily  clonazePAM  Tablet 0.5 milliGRAM(s) Oral every 12 hours  cloNIDine Patch 0.2 mG/24Hr(s) 1 patch Transdermal every 7 days  fluconAZOLE IVPB 400 milliGRAM(s) IV Intermittent every 24 hours  heparin  Infusion.  Unit(s)/Hr (15 mL/Hr) IV Continuous <Continuous>  insulin glargine Injectable (LANTUS) 40 Unit(s) SubCutaneous every morning  insulin glargine Injectable (LANTUS) 40 Unit(s) SubCutaneous at bedtime  insulin lispro (HumaLOG) corrective regimen sliding scale   SubCutaneous every 6 hours  propofol Infusion 10 MICROgram(s)/kG/Min (7.54 mL/Hr) IV Continuous <Continuous>  senna Syrup 10 milliLiter(s) Oral daily    MEDICATIONS  (PRN):  acetaminophen   Tablet .. 650 milliGRAM(s) Oral every 6 hours PRN Temp greater or equal to 38C (100.4F), Mild Pain (1 - 3)  heparin  Injectable 72347 Unit(s) IV Push every 6 hours PRN For aPTT less than 40  heparin  Injectable 5000 Unit(s) IV Push every 6 hours PRN For aPTT between 40 - 57  sodium chloride 0.9% lock flush 10 milliLiter(s) IV Push every 1 hour PRN Pre/post blood products, medications, blood draw, and to maintain line patency    Pertinent Labs: CBC Full  -  ( 23 Apr 2020 04:40 )  WBC Count : 5.84 K/uL  RBC Count : 2.64 M/uL  Hemoglobin : 8.0 g/dL  Hematocrit : 26.8 %  Platelet Count - Automated : 271 K/uL  Mean Cell Volume : 101.5 fl  Mean Cell Hemoglobin : 30.3 pg  Mean Cell Hemoglobin Concentration : 29.9 gm/dL  Auto Neutrophil # : 4.39 K/uL  Auto Lymphocyte # : 0.84 K/uL  Auto Monocyte # : 0.41 K/uL  Auto Eosinophil # : 0.02 K/uL  Auto Basophil # : 0.02 K/uL  Auto Neutrophil % : 75.3 %  Auto Lymphocyte % : 14.4 %  Auto Monocyte % : 7.0 %  Auto Eosinophil % : 0.3 %  Auto Basophil % : 0.3 %    04-23 Na146 mmol/L<H> Glu 254 mg/dL<H> K+ 4.9 mmol/L Cr  1.26 mg/dL BUN 33.0 mg/dL<H> Phos 3.4 mg/dL Alb n/a   PAB n/a       Skin: Moisture associated dermatitis per documentation     Nutrition focused physical exam not conducted at this time - found signs of malnutrition [ ]absent [ ]present    Subcutaneous fat loss: [ ] Orbital fat pads region, [ ]Buccal fat region, [ ]Triceps region,  [ ]Ribs region    Muscle wasting: [ ]Temples region, [ ]Clavicle region, [ ]Shoulder region, [ ]Scapula region, [ ]Interosseous region,  [ ]thigh region, [ ]Calf region    Estimated Needs:   [ x] no change since previous assessment  [ ] recalculated:     Current Nutrition Diagnosis: Pt remains at nutrition risk secondary to increased nutrient needs (protein) related to increased physiological demand for nutrient as evidenced by hypoxia secondary to COVID-19 prolonged intubation/sedation. Pt continues to remain intubated/sedated. Continues to receive tube feeds at goal. Propofol @ 7.54 ml/hr x 24 hrs providing an additional ~199 kcal.    Recommendations:   1) Continue enteral regimen as ordered; additional free water per MD discretion.  2) Rx: liquid MVI and vit C 500mg daily.  3) Monitor tube feed tolerance.  4) Obtain daily weights as feasible to monitor trends.     Monitoring and Evaluation:   [ ] PO intake [ x] Tolerance to diet prescription [X] Weights  [X] Follow up per protocol [X] Labs.

## 2020-04-24 NOTE — CHART NOTE - NSCHARTNOTEFT_GEN_A_CORE
Contacted patient's son, Marv, and updated them on patient's status and treatment plan.  Goals of care were discussed.  Son states that his father would not want to be in a nursing home and would not want to be with significant cognitive deficit.  Discussed weaning sedation and attempting to regain mental status.  Will continue having ongoing discussions.

## 2020-04-24 NOTE — PROGRESS NOTE ADULT - SUBJECTIVE AND OBJECTIVE BOX
24h Events:  no major vent changes or events overnight    ICU Vital Signs Last 24 Hrs  T(C): 38.7 (23 Apr 2020 23:57), Max: 38.8 (23 Apr 2020 12:18)  T(F): 101.7 (23 Apr 2020 23:57), Max: 101.8 (23 Apr 2020 12:18)  HR: 74 (24 Apr 2020 00:00) (65 - 82)  BP: 183/66 (23 Apr 2020 12:00) (183/66 - 183/66)  BP(mean): --  ABP: 175/55 (24 Apr 2020 00:00) (144/48 - 181/58)  ABP(mean): 86 (24 Apr 2020 00:00) (73 - 87)  RR: 35 (24 Apr 2020 00:00) (29 - 35)  SpO2: 97% (24 Apr 2020 00:00) (89% - 97%)      I&O's Detail    22 Apr 2020 07:01  -  23 Apr 2020 07:00  --------------------------------------------------------  IN:    Enteral Tube Flush: 395 mL    Free Water: 1100 mL    Glucerna 1.5: 1100 mL    heparin  Infusion.: 378 mL    HYDROmorphone  Infusion: 1 mL    propofol Infusion: 237 mL  Total IN: 3211 mL    OUT:    Indwelling Catheter - Urethral: 2805 mL  Total OUT: 2805 mL    Total NET: 406 mL      23 Apr 2020 07:01  -  24 Apr 2020 02:09  --------------------------------------------------------  IN:    Enteral Tube Flush: 250 mL    Free Water: 550 mL    Glucerna 1.5: 700 mL    heparin  Infusion.: 306 mL    propofol Infusion: 305.3 mL  Total IN: 2111.3 mL    OUT:    Indwelling Catheter - Urethral: 1620 mL  Total OUT: 1620 mL    Total NET: 491.3 mL          ABG - ( 23 Apr 2020 13:46 )  pH, Arterial: 7.46  pH, Blood: x     /  pCO2: 45    /  pO2: 60    / HCO3: 31    / Base Excess: 7.5   /  SaO2: 92                  MEDICATIONS  (STANDING):  ALBUTerol    90 MICROgram(s) HFA Inhaler 6 Puff(s) Inhalation every 6 hours  aMIOdarone    Tablet 200 milliGRAM(s) Oral daily  aspirin  chewable 81 milliGRAM(s) Oral daily  chlorhexidine 0.12% Liquid 15 milliLiter(s) Oral Mucosa every 12 hours  chlorhexidine 2% Cloths 1 Application(s) Topical daily  clonazePAM  Tablet 0.5 milliGRAM(s) Oral every 12 hours  cloNIDine Patch 0.2 mG/24Hr(s) 1 patch Transdermal every 7 days  fluconAZOLE IVPB 400 milliGRAM(s) IV Intermittent every 24 hours  heparin  Infusion.  Unit(s)/Hr (15 mL/Hr) IV Continuous <Continuous>  insulin glargine Injectable (LANTUS) 40 Unit(s) SubCutaneous every morning  insulin glargine Injectable (LANTUS) 40 Unit(s) SubCutaneous at bedtime  insulin lispro (HumaLOG) corrective regimen sliding scale   SubCutaneous every 6 hours  propofol Infusion 10 MICROgram(s)/kG/Min (7.54 mL/Hr) IV Continuous <Continuous>  senna Syrup 10 milliLiter(s) Oral daily    MEDICATIONS  (PRN):  acetaminophen   Tablet .. 650 milliGRAM(s) Oral every 6 hours PRN Temp greater or equal to 38C (100.4F), Mild Pain (1 - 3)  heparin  Injectable 55709 Unit(s) IV Push every 6 hours PRN For aPTT less than 40  heparin  Injectable 5000 Unit(s) IV Push every 6 hours PRN For aPTT between 40 - 57  sodium chloride 0.9% lock flush 10 milliLiter(s) IV Push every 1 hour PRN Pre/post blood products, medications, blood draw, and to maintain line patency        Physical Exam:    Neurological: propofol gtt for sedation / analgesia. rass - 5    Pulmonary: mechanical ventilation; simv 6/500/15+8/50%    Cardiovascular: S1, S2, regular rate & rhythm    Gastrointestinal: soft, non-tender, non-distended, no rebound / guarding    : brown in place    Skin: warm, dry, no diaphoresis, no pallor, cyanosis, or jaundice    LABS:  CBC Full  -  ( 23 Apr 2020 04:40 )  WBC Count : 5.84 K/uL  RBC Count : 2.64 M/uL  Hemoglobin : 8.0 g/dL  Hematocrit : 26.8 %  Platelet Count - Automated : 271 K/uL  Mean Cell Volume : 101.5 fl  Mean Cell Hemoglobin : 30.3 pg  Mean Cell Hemoglobin Concentration : 29.9 gm/dL  Auto Neutrophil # : 4.39 K/uL  Auto Lymphocyte # : 0.84 K/uL  Auto Monocyte # : 0.41 K/uL  Auto Eosinophil # : 0.02 K/uL  Auto Basophil # : 0.02 K/uL  Auto Neutrophil % : 75.3 %  Auto Lymphocyte % : 14.4 %  Auto Monocyte % : 7.0 %  Auto Eosinophil % : 0.3 %  Auto Basophil % : 0.3 %    04-23    146<H>  |  106  |  33.0<H>  ----------------------------<  254<H>  4.9   |  28.0  |  1.26    Ca    8.6      23 Apr 2020 04:40  Phos  3.4     04-23  Mg     2.1     04-23    TPro  5.9<L>  /  Alb  2.0<L>  /  TBili  <0.2<L>  /  DBili  x   /  AST  36  /  ALT  32  /  AlkPhos  60  04-22    PTT - ( 23 Apr 2020 04:40 )  PTT:68.2 sec    RECENT CULTURES:  04-20 .Blood Blood XXXX XXXX   No growth at 48 hours    04-18 .Blood Blood XXXX XXXX   No growth at 5 days.        LIVER FUNCTIONS - ( 22 Apr 2020 04:48 )  Alb: 2.0 g/dL / Pro: 5.9 g/dL / ALK PHOS: 60 U/L / ALT: 32 U/L / AST: 36 U/L / GGT: x                   ASSESSMENT/PLAN:  74y Male COVID + w/ hypoxic respiratory failure. Hospital course complicated by superimposed bacterial PNA and most recently, fungemia.     Neuro: sedation / analgesia with propofol, delirium precautions, daily sedation holiday    CV: continue invasive monitoring with arterial line    Pulm: wean FiO2 / PEEP as tolerated    GI/Nutrition: NPO with tube feeds     /Renal: brown for strict I&O, monitor kidney fxn    ID: fluconazole    Endo: monitor blood glucose, lantus, ISS    Skin: repositioning for DTI prevention while in bed    Heme/DVT Prophylaxis: SCDs, heparin gtt for hypercoagulable state- monitor PTT

## 2020-04-25 NOTE — CHART NOTE - NSCHARTNOTEFT_GEN_A_CORE
Spoke to patient's son Marv and updated him on patient's condition. Marv is aware that Mr. Cardenas's mental status has not been improving despite holding all sedation. We plan to set up a facetime this afternoon so that Marv can speak to and see his father. We again discussed the possibility of a tracheostomy, Marv feels that if his father's mental status continues to plateau and shows no sings of improvement, a trach would not provide meaningful recovery.

## 2020-04-25 NOTE — PROGRESS NOTE ADULT - ATTENDING COMMENTS
Patient was seen and examined during SICU rounds between 8-10am  Sedated  SIMV/5/500/15 +8  Plan:  Neuro: sedation  holiday remains on multifactorial encephalopathy  Pulm: vent mamie, might require tracheostomy  CV: perfusion is adequate  GI: TEN  : function stable  ID; fluconazole for fungemia  Heme: hep drip h/h stable

## 2020-04-25 NOTE — PROGRESS NOTE ADULT - SUBJECTIVE AND OBJECTIVE BOX
24h Events:   no acute events or vent changes made overnight  weaned sedation, opens eyes to verbal stimuli but does not follow commands    ICU Vital Signs Last 24 Hrs  T(C): 38.2 (24 Apr 2020 23:35), Max: 38.6 (24 Apr 2020 11:51)  T(F): 100.8 (24 Apr 2020 23:35), Max: 101.5 (24 Apr 2020 11:51)  HR: 62 (25 Apr 2020 00:15) (57 - 101)  BP: 140/67 (24 Apr 2020 19:00) (115/56 - 152/67)  BP(mean): 96 (24 Apr 2020 19:00) (81 - 96)  ABP: 116/44 (25 Apr 2020 00:15) (116/44 - 185/60)  ABP(mean): 62 (25 Apr 2020 00:15) (62 - 94)  RR: 14 (25 Apr 2020 00:15) (12 - 48)  SpO2: 94% (25 Apr 2020 00:15) (93% - 97%)      I&O's Detail    23 Apr 2020 07:01  -  24 Apr 2020 07:00  --------------------------------------------------------  IN:    Enteral Tube Flush: 250 mL    Free Water: 550 mL    Glucerna 1.5: 1000 mL    heparin  Infusion.: 414 mL    propofol Infusion: 407 mL  Total IN: 2621 mL    OUT:    Indwelling Catheter - Urethral: 2170 mL  Total OUT: 2170 mL    Total NET: 451 mL      24 Apr 2020 07:01  -  25 Apr 2020 01:48  --------------------------------------------------------  IN:    Enteral Tube Flush: 160 mL    Free Water: 825 mL    Glucerna 1.5: 700 mL    heparin  Infusion.: 252 mL    HYDROmorphone Infusion.: 5.4 mL    propofol Infusion: 124.5 mL    Solution: 200 mL  Total IN: 2266.9 mL    OUT:    Indwelling Catheter - Urethral: 1265 mL  Total OUT: 1265 mL    Total NET: 1001.9 mL          ABG - ( 24 Apr 2020 05:30 )  pH, Arterial: 7.38  pH, Blood: x     /  pCO2: 56    /  pO2: 69    / HCO3: 31    / Base Excess: 7.0   /  SaO2: 93                  MEDICATIONS  (STANDING):  ALBUTerol    90 MICROgram(s) HFA Inhaler 6 Puff(s) Inhalation every 6 hours  aMIOdarone    Tablet 200 milliGRAM(s) Oral daily  aspirin  chewable 81 milliGRAM(s) Oral daily  chlorhexidine 0.12% Liquid 15 milliLiter(s) Oral Mucosa every 12 hours  chlorhexidine 2% Cloths 1 Application(s) Topical daily  clonazePAM  Tablet 0.5 milliGRAM(s) Oral every 12 hours  cloNIDine Patch 0.2 mG/24Hr(s) 1 patch Transdermal every 7 days  fluconAZOLE IVPB 400 milliGRAM(s) IV Intermittent every 24 hours  heparin  Infusion.  Unit(s)/Hr (15 mL/Hr) IV Continuous <Continuous>  HYDROmorphone Infusion. 1 mG/Hr (1 mL/Hr) IV Continuous <Continuous>  insulin glargine Injectable (LANTUS) 40 Unit(s) SubCutaneous at bedtime  insulin glargine Injectable (LANTUS) 40 Unit(s) SubCutaneous every morning  insulin lispro (HumaLOG) corrective regimen sliding scale   SubCutaneous every 6 hours  senna Syrup 10 milliLiter(s) Oral daily    MEDICATIONS  (PRN):  acetaminophen   Tablet .. 650 milliGRAM(s) Oral every 6 hours PRN Temp greater or equal to 38C (100.4F), Mild Pain (1 - 3)  heparin  Injectable 91369 Unit(s) IV Push every 6 hours PRN For aPTT less than 40  heparin  Injectable 5000 Unit(s) IV Push every 6 hours PRN For aPTT between 40 - 57  sodium chloride 0.9% lock flush 10 milliLiter(s) IV Push every 1 hour PRN Pre/post blood products, medications, blood draw, and to maintain line patency        Physical Exam:    Neurological: propofol gtt for sedation / analgesia. rass -3    Pulmonary: mechanical ventilation; simv     Cardiovascular: S1, S2, regular rate & rhythm    Gastrointestinal: soft, non-tender, non-distended, no rebound / guarding    : brown in place    Skin: warm, dry, no diaphoresis, no pallor, cyanosis, or jaundice    LABS:  CBC Full  -  ( 24 Apr 2020 05:26 )  WBC Count : 5.45 K/uL  RBC Count : 2.60 M/uL  Hemoglobin : 7.7 g/dL  Hematocrit : 26.8 %  Platelet Count - Automated : 291 K/uL  Mean Cell Volume : 103.1 fl  Mean Cell Hemoglobin : 29.6 pg  Mean Cell Hemoglobin Concentration : 28.7 gm/dL  Auto Neutrophil # : 4.05 K/uL  Auto Lymphocyte # : 0.80 K/uL  Auto Monocyte # : 0.47 K/uL  Auto Eosinophil # : 0.01 K/uL  Auto Basophil # : 0.02 K/uL  Auto Neutrophil % : 74.3 %  Auto Lymphocyte % : 14.7 %  Auto Monocyte % : 8.6 %  Auto Eosinophil % : 0.2 %  Auto Basophil % : 0.4 %    04-24    146<H>  |  106  |  29.0<H>  ----------------------------<  246<H>  4.7   |  29.0  |  1.23    Ca    8.4<L>      24 Apr 2020 05:26  Phos  3.8     04-24  Mg     2.0     04-24      PTT - ( 24 Apr 2020 05:26 )  PTT:68.9 sec    RECENT CULTURES:  04-20 .Blood Blood XXXX XXXX   No growth at 48 hours    04-18 .Blood Blood XXXX XXXX   No growth at 5 days.      ASSESSMENT/PLAN:  74y Male COVID + w/ hypoxic respiratory failure. Hospital course complicated by superimposed bacterial PNA and most recently, fungemia.     Neuro: sedation / analgesia with propofol, delirium precautions, daily sedation holiday    CV: continue invasive monitoring with arterial line    Pulm: wean FiO2 / PEEP as tolerated    GI/Nutrition: NPO with tube feeds     /Renal: brown for strict I&O, monitor kidney fxn    ID: fluconazole for fungemia    Endo: monitor blood glucose, lantus, ISS    Skin: repositioning for DTI prevention while in bed    Heme/DVT Prophylaxis: SCDs, heparin gtt for hypercoagulable state- monitor PTT

## 2020-04-26 NOTE — PROGRESS NOTE ADULT - SUBJECTIVE AND OBJECTIVE BOX
24h Events:  continues to spike high fevers    ICU Vital Signs Last 24 Hrs  T(C): 38.2 (26 Apr 2020 00:00), Max: 39.8 (25 Apr 2020 11:00)  T(F): 100.8 (26 Apr 2020 00:00), Max: 103.6 (25 Apr 2020 11:00)  HR: 67 (26 Apr 2020 00:00) (61 - 95)  BP: 156/72 (26 Apr 2020 00:00) (82/45 - 160/77)  BP(mean): 104 (26 Apr 2020 00:00) (58 - 111)  ABP: 173/62 (26 Apr 2020 00:00) (103/50 - 217/79)  ABP(mean): 91 (26 Apr 2020 00:00) (66 - 120)  RR: 20 (26 Apr 2020 00:00) (10 - 28)  SpO2: 96% (26 Apr 2020 00:00) (87% - 100%)      I&O's Detail    24 Apr 2020 07:01  -  25 Apr 2020 07:00  --------------------------------------------------------  IN:    Enteral Tube Flush: 160 mL    Free Water: 1425 mL    Glucerna 1.5: 1050 mL    heparin  Infusion.: 378 mL    HYDROmorphone Infusion.: 12.4 mL    propofol Infusion: 124.5 mL    Solution: 200 mL  Total IN: 3349.9 mL    OUT:    Indwelling Catheter - Urethral: 1890 mL  Total OUT: 1890 mL    Total NET: 1459.9 mL      25 Apr 2020 07:01  -  26 Apr 2020 01:49  --------------------------------------------------------  IN:    dexmedetomidine Infusion: 25.1 mL    Enteral Tube Flush: 125 mL    Free Water: 1200 mL    Glucerna 1.5: 800 mL    heparin  Infusion.: 18 mL    heparin  Infusion.: 288 mL    Solution: 200 mL  Total IN: 2656.1 mL    OUT:    Indwelling Catheter - Urethral: 1460 mL  Total OUT: 1460 mL    Total NET: 1196.1 mL          ABG - ( 25 Apr 2020 13:52 )  pH, Arterial: 7.44  pH, Blood: x     /  pCO2: 46    /  pO2: 91    / HCO3: 30    / Base Excess: 6.1   /  SaO2: 99                  MEDICATIONS  (STANDING):  ALBUTerol    90 MICROgram(s) HFA Inhaler 6 Puff(s) Inhalation every 6 hours  aMIOdarone    Tablet 200 milliGRAM(s) Oral daily  aspirin  chewable 81 milliGRAM(s) Oral daily  chlorhexidine 0.12% Liquid 15 milliLiter(s) Oral Mucosa two times a day  chlorhexidine 2% Cloths 1 Application(s) Topical daily  dexMEDEtomidine Infusion 0.3 MICROgram(s)/kG/Hr (9.42 mL/Hr) IV Continuous <Continuous>  fluconAZOLE IVPB 400 milliGRAM(s) IV Intermittent every 24 hours  heparin  Infusion. 1800 Unit(s)/Hr (18 mL/Hr) IV Continuous <Continuous>  insulin glargine Injectable (LANTUS) 45 Unit(s) SubCutaneous every morning  insulin glargine Injectable (LANTUS) 45 Unit(s) SubCutaneous at bedtime  insulin lispro (HumaLOG) corrective regimen sliding scale   SubCutaneous every 6 hours  senna Syrup 10 milliLiter(s) Oral daily    MEDICATIONS  (PRN):  acetaminophen   Tablet .. 650 milliGRAM(s) Oral every 6 hours PRN Temp greater or equal to 38C (100.4F), Mild Pain (1 - 3)  fentaNYL    Injectable 50 MICROGram(s) IV Push every 4 hours PRN breakthrough pain  heparin   Injectable 06734 Unit(s) IV Push every 6 hours PRN For aPTT less than 40  heparin   Injectable 5000 Unit(s) IV Push every 6 hours PRN For aPTT between 40 - 57  hydrALAZINE Injectable 10 milliGRAM(s) IV Push every 4 hours PRN SBP>180  sodium chloride 0.9% lock flush 10 milliLiter(s) IV Push every 1 hour PRN Pre/post blood products, medications, blood draw, and to maintain line patency        Physical Exam:    Neurological: precedex gtt for sedation / analgesia. opens eyes, slowly starting to follow commands    Pulmonary: mechanical ventilation; simv     Cardiovascular: S1, S2, regular rate & rhythm    Gastrointestinal: soft, non-tender, non-distended, no rebound / guarding    : brown in place    Skin: warm, dry, no diaphoresis, no pallor, cyanosis, or jaundice    LABS:  CBC Full  -  ( 25 Apr 2020 04:11 )  WBC Count : 6.64 K/uL  RBC Count : 2.51 M/uL  Hemoglobin : 7.6 g/dL  Hematocrit : 26.9 %  Platelet Count - Automated : 289 K/uL  Mean Cell Volume : 107.2 fl  Mean Cell Hemoglobin : 30.3 pg  Mean Cell Hemoglobin Concentration : 28.3 gm/dL  Auto Neutrophil # : 4.63 K/uL  Auto Lymphocyte # : 1.15 K/uL  Auto Monocyte # : 0.60 K/uL  Auto Eosinophil # : 0.01 K/uL  Auto Basophil # : 0.03 K/uL  Auto Neutrophil % : 69.7 %  Auto Lymphocyte % : 17.3 %  Auto Monocyte % : 9.0 %  Auto Eosinophil % : 0.2 %  Auto Basophil % : 0.5 %    04-25    147<H>  |  107  |  29.0<H>  ----------------------------<  195<H>  5.1   |  31.0<H>  |  1.26    Ca    8.3<L>      25 Apr 2020 04:11  Phos  4.5     04-25  Mg     2.1     04-25      PTT - ( 25 Apr 2020 04:11 )  PTT:70.2 sec    RECENT CULTURES:  04-20 .Blood Blood XXXX XXXX   No growth at 5 days.                  ASSESSMENT/PLAN:  74y Male COVID + w/ hypoxic respiratory failure. Hospital course complicated by superimposed bacterial PNA and most recently, fungemia.     Neuro: sedation / analgesia with precedex, delirium precautions, daily sedation holiday    CV: continue invasive monitoring with arterial line    Pulm: wean FiO2 / PEEP as tolerated - considering trach collar     GI/Nutrition: NPO with tube feeds     /Renal: brown for strict I&O, monitor kidney fxn    ID: fluconazole for fungemia    Endo: monitor blood glucose, lantus, ISS    Skin: repositioning for DTI prevention while in bed    Heme/DVT Prophylaxis: SCDs, heparin gtt for hypercoagulable state- monitor PTT

## 2020-04-26 NOTE — PROGRESS NOTE ADULT - ATTENDING COMMENTS
The patient was seen and examined  Events noted  No new problems    Neurologic:  Sedated  Respiratory:  SIMV 6 40%, PEEP+10; ZNM=034  Hemodynamic:  Normal  Renal:  Urine flow okay  GI:  TEN  Hematologic:  Hgb okay  ID:  Ffdn=051.5, fluconazole for candidemia, cultures sent    Plan:  I am concerned about unchecked infection--will follow cultures  Anticoagulation  Nutritional support  Can try PSV

## 2020-04-26 NOTE — CHART NOTE - NSCHARTNOTEFT_GEN_A_CORE
Spoke to patient's son Claudio and provided an update on patient's condition. All questions were answered and all concerns were addressed. Claudio requests that if Mr. Cardenas's mental status does start to improve, he would like to facetime.

## 2020-04-27 NOTE — PROGRESS NOTE ADULT - SUBJECTIVE AND OBJECTIVE BOX
24h Events:  no acute events or vent changes made overnight     ICU Vital Signs Last 24 Hrs  T(C): 38.5 (27 Apr 2020 02:00), Max: 38.5 (27 Apr 2020 02:00)  T(F): 101.3 (27 Apr 2020 02:00), Max: 101.3 (27 Apr 2020 02:00)  HR: 97 (27 Apr 2020 02:00) (61 - 97)  BP: 169/75 (27 Apr 2020 02:00) (150/67 - 232/98)  BP(mean): 108 (27 Apr 2020 02:00) (97 - 140)  ABP: 154/62 (27 Apr 2020 02:00) (129/51 - 206/66)  ABP(mean): 88 (27 Apr 2020 02:00) (11 - 107)  RR: 33 (27 Apr 2020 02:00) (20 - 33)  SpO2: 97% (27 Apr 2020 02:00) (87% - 100%)      I&O's Detail    25 Apr 2020 07:01  -  26 Apr 2020 07:00  --------------------------------------------------------  IN:    dexmedetomidine Infusion: 37.5 mL    Enteral Tube Flush: 725 mL    Free Water: 1500 mL    Glucerna 1.5: 1200 mL    heparin  Infusion.: 417 mL    heparin  Infusion.: 18 mL    Solution: 200 mL  Total IN: 4097.5 mL    OUT:    Indwelling Catheter - Urethral: 2030 mL  Total OUT: 2030 mL    Total NET: 2067.5 mL      26 Apr 2020 07:01  -  27 Apr 2020 02:21  --------------------------------------------------------  IN:    dexmedetomidine Infusion: 73.2 mL    Free Water: 600 mL    Glucerna 1.5: 800 mL    heparin  Infusion.: 270 mL    insulin regular Infusion: 44 mL  Total IN: 1787.2 mL    OUT:    Indwelling Catheter - Urethral: 1670 mL  Total OUT: 1670 mL    Total NET: 117.2 mL          ABG - ( 26 Apr 2020 04:13 )  pH, Arterial: 7.48  pH, Blood: x     /  pCO2: 43    /  pO2: 73    / HCO3: 32    / Base Excess: 8.1   /  SaO2: 96                  MEDICATIONS  (STANDING):  ALBUTerol    90 MICROgram(s) HFA Inhaler 6 Puff(s) Inhalation every 6 hours  aMIOdarone    Tablet 200 milliGRAM(s) Oral daily  aspirin  chewable 81 milliGRAM(s) Oral daily  chlorhexidine 0.12% Liquid 15 milliLiter(s) Oral Mucosa two times a day  chlorhexidine 2% Cloths 1 Application(s) Topical daily  dexMEDEtomidine Infusion 0.3 MICROgram(s)/kG/Hr (9.42 mL/Hr) IV Continuous <Continuous>  fluconAZOLE IVPB 400 milliGRAM(s) IV Intermittent every 24 hours  heparin  Infusion. 1800 Unit(s)/Hr (18 mL/Hr) IV Continuous <Continuous>  insulin regular Infusion 6 Unit(s)/Hr (6 mL/Hr) IV Continuous <Continuous>  senna Syrup 10 milliLiter(s) Oral daily    MEDICATIONS  (PRN):  acetaminophen   Tablet .. 650 milliGRAM(s) Oral every 6 hours PRN Temp greater or equal to 38C (100.4F), Mild Pain (1 - 3)  fentaNYL    Injectable 50 MICROGram(s) IV Push every 4 hours PRN breakthrough pain  heparin   Injectable 34527 Unit(s) IV Push every 6 hours PRN For aPTT less than 40  heparin   Injectable 5000 Unit(s) IV Push every 6 hours PRN For aPTT between 40 - 57  hydrALAZINE Injectable 10 milliGRAM(s) IV Push every 4 hours PRN SBP>180  sodium chloride 0.9% lock flush 10 milliLiter(s) IV Push every 1 hour PRN Pre/post blood products, medications, blood draw, and to maintain line patency        Physical Exam:    Neurological: precedex gtt for sedation / analgesia. opens eyes, slowly starting to follow commands    Pulmonary: mechanical ventilation; simv     Cardiovascular: S1, S2, regular rate & rhythm    Gastrointestinal: soft, non-tender, non-distended, no rebound / guarding    : brown in place    Skin: warm, dry, no diaphoresis, no pallor, cyanosis, or jaundice    LABS:  CBC Full  -  ( 26 Apr 2020 05:29 )  WBC Count : 6.78 K/uL  RBC Count : 2.57 M/uL  Hemoglobin : 7.7 g/dL  Hematocrit : 26.1 %  Platelet Count - Automated : 317 K/uL  Mean Cell Volume : 101.6 fl  Mean Cell Hemoglobin : 30.0 pg  Mean Cell Hemoglobin Concentration : 29.5 gm/dL  Auto Neutrophil # : 5.06 K/uL  Auto Lymphocyte # : 0.93 K/uL  Auto Monocyte # : 0.54 K/uL  Auto Eosinophil # : 0.01 K/uL  Auto Basophil # : 0.03 K/uL  Auto Neutrophil % : 74.7 %  Auto Lymphocyte % : 13.7 %  Auto Monocyte % : 8.0 %  Auto Eosinophil % : 0.1 %  Auto Basophil % : 0.4 %    04-26    143  |  102  |  36.0<H>  ----------------------------<  254<H>  4.1   |  27.0  |  1.21    Ca    8.1<L>      26 Apr 2020 05:29  Phos  3.1     04-26  Mg     1.9     04-26      PTT - ( 26 Apr 2020 22:20 )  PTT:88.2 sec    RECENT CULTURES:  04-20 .Blood Blood XXXX XXXX   No growth at 5 days.                  ASSESSMENT/PLAN:  74y Male COVID + w/ hypoxic respiratory failure. Hospital course complicated by superimposed bacterial PNA and most recently, fungemia.     Neuro: sedation / analgesia with precedex, delirium precautions, daily sedation holiday    CV: continue invasive monitoring with arterial line    Pulm: wean FiO2 / PEEP as tolerated - considering trach collar     GI/Nutrition: NPO with tube feeds     /Renal: brown for strict I&O, monitor kidney fxn    ID: fluconazole for fungemia    Endo: monitor blood glucose, lantus, ISS    Skin: repositioning for DTI prevention while in bed    Heme/DVT Prophylaxis: SCDs, heparin gtt for hypercoagulable state- monitor PTT

## 2020-04-27 NOTE — PROGRESS NOTE ADULT - ATTENDING COMMENTS
75 yo M with PMH of DM, HTN, presents with ARDS due to COVID 19.  Intubated, vented, and sedated. open eyes, MUHAMMAD, follow command, GCS 10T. PF ratio 172.  L CT with small intermittent leak .  Continue Amio and hep gtt for afib.   Continue wean vent as tolerated.  Continue sedation to enhance vent wean.  Continue to control BG  with lantus and high ISS   Fungemia and continue fluconazole, vanc and cefepime  VTE ppx SCD/heparin   Continue ICU care for cardiopulmonary support         code [id number]       CCT 40 min .

## 2020-04-27 NOTE — CHART NOTE - NSCHARTNOTEFT_GEN_A_CORE
Source: Patient [ ]  Family [ ]   other [ x] EMR     Enteral /Parenteral Nutrition: Diet, NPO with Tube Feed:   Tube Feeding Modality: Orogastric  Glucerna 1.5 Jatinder  Total Volume for 24 Hours (mL): 1200  Continuous  Starting Tube Feed Rate {mL per Hour}: 20     Every 4 hours  Until Goal Tube Feed Rate (mL per Hour): 50  Tube Feed Duration (in Hours): 24  Tube Feed Start Time: 05:45  No Carb Prosource (1pkg = 15gms Protein)     Qty per Day:  1 (04-03-20 @ 05:49)      Current Weight:   (4/25) 308 lbs   (4/23) 305.1 lbs  (4/22) 308.6 lbs  (4/21) 301.3 lbs  (4/18) 301.1 lbs  (4/15) 294 lbs  (4/14) 292.7 lbs  (4/13) 290.3 lbs  (4/12) 294.9 lbs  (4/11) 300.9 lbs  (4/10) 303.3 lbs  (4/9) 292.9 lbs  (4/7) 272.4 lbs  (4/4) 276.9 lbs  (4/3) 276.9 lbs      % Weight Change (Wt's continue to be inconsistent, will continue to monitor for trends) 3+ dependent edema noted.     Pertinent Medications: MEDICATIONS  (STANDING):  ALBUTerol    90 MICROgram(s) HFA Inhaler 6 Puff(s) Inhalation every 6 hours  aMIOdarone    Tablet 200 milliGRAM(s) Oral daily  aspirin  chewable 81 milliGRAM(s) Oral daily  chlorhexidine 0.12% Liquid 15 milliLiter(s) Oral Mucosa two times a day  chlorhexidine 2% Cloths 1 Application(s) Topical daily  dexMEDEtomidine Infusion 0.3 MICROgram(s)/kG/Hr (9.42 mL/Hr) IV Continuous <Continuous>  fluconAZOLE IVPB 400 milliGRAM(s) IV Intermittent every 24 hours  heparin  Infusion. 1800 Unit(s)/Hr (18 mL/Hr) IV Continuous <Continuous>  insulin regular Infusion 6 Unit(s)/Hr (6 mL/Hr) IV Continuous <Continuous>  senna Syrup 10 milliLiter(s) Oral daily    MEDICATIONS  (PRN):  acetaminophen   Tablet .. 650 milliGRAM(s) Oral every 6 hours PRN Temp greater or equal to 38C (100.4F), Mild Pain (1 - 3)  fentaNYL    Injectable 50 MICROGram(s) IV Push every 4 hours PRN breakthrough pain  heparin   Injectable 59385 Unit(s) IV Push every 6 hours PRN For aPTT less than 40  heparin   Injectable 5000 Unit(s) IV Push every 6 hours PRN For aPTT between 40 - 57  hydrALAZINE Injectable 10 milliGRAM(s) IV Push every 4 hours PRN SBP>180  sodium chloride 0.9% lock flush 10 milliLiter(s) IV Push every 1 hour PRN Pre/post blood products, medications, blood draw, and to maintain line patency    Pertinent Labs: CBC Full  -  ( 27 Apr 2020 04:54 )  WBC Count : 12.29 K/uL  RBC Count : 2.76 M/uL  Hemoglobin : 8.4 g/dL  Hematocrit : 29.6 %  Platelet Count - Automated : 370 K/uL  Mean Cell Volume : 107.2 fl  Mean Cell Hemoglobin : 30.4 pg  Mean Cell Hemoglobin Concentration : 28.4 gm/dL  Auto Neutrophil # : 9.92 K/uL  Auto Lymphocyte # : 0.54 K/uL  Auto Monocyte # : 1.51 K/uL  Auto Eosinophil # : 0.00 K/uL  Auto Basophil # : 0.00 K/uL  Auto Neutrophil % : 80.7 %  Auto Lymphocyte % : 4.4 %  Auto Monocyte % : 12.3 %  Auto Eosinophil % : 0.0 %  Auto Basophil % : 0.0 %        Skin: Moisture associated dermatitis noted, small open area to Buttock crack       Estimated Needs:   [x ] no change since previous assessment  [ ] recalculated:     Current Nutrition Diagnosis:  Pt remains at high nutrition risk secondary to Moderate-Acute protein calorie malnutrition related to inability to meet sufficient energy requirements in setting of COVID + with hypoxic respiratory failure, Hospital course complicated by superimposed bacterial PNA and most recently, fungemia, remains intubated on vent support as evidenced by meeting less then 75% estimated energy requirements > 5 days, and +fluid accumulation. Pt continues to receive enteral feeds of Glucerna 1.5, goal rate of 50 ml/hr (x20 hr) + Prostat 30 ml once daily provides: 1030 ml, 1600 kcal, 98g protein, 759 ml free water, and 100% of RDIs for vitamins/minerals. K+ and Phos remain elevated noted. Recommendations below:       Recommendations:   1. RX: MVI, Thiamine and Vit. C daily   2. Check wt daily to monitor trends  3. Monitor Renal labs, H/H, BGM   4. Consider changing enteral formula to Nepro @ 50ml/hr (x 20 hours) 1000ml/day; 1800kcal, 81gm protein, + prostat daily (100kcal, 15gm protein, to better meet pt's estimated nutrition needs.   5. Monitor tolerance closely   6. Continue with free water 200ml every 6 hours     Monitoring and Evaluation:   [ ] PO intake [x ] Tolerance to diet prescription [X] Weights  [X] Follow up per protocol [X] Labs:

## 2020-04-28 NOTE — PROGRESS NOTE ADULT - SUBJECTIVE AND OBJECTIVE BOX
24h Events: blood cultures from  negative. Heparin gtt held at 6 PM due to hematuria, brown irrigated and clot evacuated. Heparin gtt re-started at 9:30 PM.    ICU Vital Signs Last 24 Hrs  T(C): 38 (2020 03:26), Max: 38.6 (2020 07:58)  T(F): 100.4 (2020 03:26), Max: 101.5 (2020 07:58)  HR: 78 (2020 03:32) (65 - 90)  BP: --  BP(mean): --  ABP: 159/52 (2020 00:00) (118/53 - 192/66)  ABP(mean): 79 (2020 00:00) (71 - 103)  RR: 21 (2020 00:00) (20 - 38)  SpO2: 97% (2020 03:32) (94% - 100%)    I&O's Detail    2020 07:01  -  2020 07:00  --------------------------------------------------------  IN:    dexmedetomidine Infusion: 123.6 mL    Free Water: 1000 mL    Glucerna 1.5: 1200 mL    heparin  Infusion.: 432 mL    insulin regular Infusion: 82 mL  Total IN: 2837.6 mL    OUT:    Indwelling Catheter - Urethral: 2030 mL  Total OUT: 2030 mL    Total NET: 807.6 mL    2020 07:01  -  2020 05:06  --------------------------------------------------------  IN:    dexmedetomidine Infusion: 99 mL    Free Water: 400 mL    Glucerna 1.5: 250 mL    heparin  Infusion.: 261 mL    insulin regular Infusion: 121 mL    Nepro: 600 mL  Total IN: 1731 mL    OUT:    Indwelling Catheter - Urethral: 1565 mL  Total OUT: 1565 mL    Total NET: 166 mL    ABG - ( 2020 04:08 )  pH, Arterial: 7.49  pH, Blood: x     /  pCO2: 43    /  pO2: 63    / HCO3: 33    / Base Excess: 8.8   /  SaO2: 95        MEDICATIONS  (STANDING):  ALBUTerol  90 MICROgram(s) HFA Inhaler 6 Puff(s) Inhalation every 6 hours  aMIOdarone Tablet 200 milliGRAM(s) Oral daily  aspirin  chewable 81 milliGRAM(s) Oral daily  chlorhexidine 0.12% Liquid 15 milliLiter(s) Oral Mucosa two times a day  chlorhexidine 2% Cloths 1 Application(s) Topical daily  dexMEDEtomidine Infusion 0.3 MICROgram(s)/kG/Hr (9.42 mL/Hr) IV Continuous <Continuous>  fluconAZOLE IVPB 400 milliGRAM(s) IV Intermittent every 24 hours  heparin  Infusion. 1800 Unit(s)/Hr (18 mL/Hr) IV Continuous <Continuous>  insulin regular Infusion 6 Unit(s)/Hr (6 mL/Hr) IV Continuous <Continuous>  pantoprazole   Suspension 40 milliGRAM(s) Oral daily  senna Syrup 10 milliLiter(s) Oral daily  sodium zirconium cyclosilicate 10 Gram(s) Oral three times a day    MEDICATIONS  (PRN):  acetaminophen   Tablet .. 650 milliGRAM(s) Oral every 6 hours PRN Temp greater or equal to 38C (100.4F), Mild Pain (1 - 3)  fentaNYL    Injectable 50 MICROGram(s) IV Push every 4 hours PRN breakthrough pain  heparin   Injectable 42524 Unit(s) IV Push every 6 hours PRN For aPTT less than 40  heparin   Injectable 5000 Unit(s) IV Push every 6 hours PRN For aPTT between 40 - 57  hydrALAZINE Injectable 10 milliGRAM(s) IV Push every 4 hours PRN SBP>180  sodium chloride 0.9% lock flush 10 milliLiter(s) IV Push every 1 hour PRN Pre/post blood products, medications, blood draw, and to maintain line patency    Physical Exam:    Neurological: occasionally following commands, able to show thumbs up earlier this evening, still very lethargic, eyes slow to open, L eye drifts midline (not a new finding)    Pulmonary: mechanical ventilation; breath sounds b/l    Cardiovascular: S1, S2, regular rate & rhythm    Gastrointestinal: soft, non-tender, non-distended, no rebound / guarding    : Brown in place draining yellow urine    Skin: warm, dry, no diaphoresis, no pallor, cyanosis, or jaundice    LABS:  CBC Full  -  ( 2020 03:30 )  WBC Count : 7.50 K/uL  RBC Count : 2.33 M/uL  Hemoglobin : 7.1 g/dL  Hematocrit : 23.7 %  Platelet Count - Automated : 294 K/uL  Mean Cell Volume : 101.7 fl  Mean Cell Hemoglobin : 30.5 pg  Mean Cell Hemoglobin Concentration : 30.0 gm/dL  Auto Neutrophil # : 5.99 K/uL  Auto Lymphocyte # : 0.69 K/uL  Auto Monocyte # : 0.63 K/uL  Auto Eosinophil # : 0.00 K/uL  Auto Basophil # : 0.01 K/uL  Auto Neutrophil % : 79.9 %  Auto Lymphocyte % : 9.2 %  Auto Monocyte % : 8.4 %  Auto Eosinophil % : 0.0 %  Auto Basophil % : 0.1 %        142  |  102  |  40.0<H>  ----------------------------<  133<H>  4.0   |  28.0  |  1.12    Ca    8.3<L>      2020 03:30  Phos  2.7       Mg     2.2     -28    PT/INR - ( 2020 11:30 )   PT: 14.6 sec;   INR: 1.28 ratio      PTT - ( 2020 03:30 )  PTT:54.3 sec  Urinalysis Basic - ( 2020 12:29 )    Color: Yellow / Appearance: Clear / S.015 / pH: x  Gluc: x / Ketone: Negative  / Bili: Negative / Urobili: Negative mg/dL   Blood: x / Protein: 30 mg/dL / Nitrite: Negative   Leuk Esterase: Negative / RBC: 6-10 /HPF / WBC 0-2   Sq Epi: x / Non Sq Epi: Negative / Bacteria: Occasional    RECENT CULTURES:   .Blood Blood-Peripheral XXXX XXXX   No growth at 48 hours     .Blood Blood-Peripheral XXXX XXXX   No growth at 48 hours    ASSESSMENT/PLAN:  74y Male with a long hospital course 2/2 COVID-19 complicated by hypoactive delirium and fungemia 2/2 tropicalis. He continues to slowly improve his mental status off sedation, and remains supported on fairly low ventilator settings.     Neuro:   - avoid sedating agents  - low dose Precedex as needed for anxiety    CV:   - continue invasive monitoring with arterial line  - continue hydralazine for hypertension    Pulm:   - PF this morning is 157 on 40%, oxygen saturation remains > 90%  - trial pressure support if mental status allows    GI/Nutrition:   - NPO with tube feeds    /Renal:   - brown for strict I&O  - monitor for hematuria  - monitor kidney fxn    ID:  - continue fluconazole for tropicalis  - blood cultures cleared    Endo:  - monitor blood glucose hourly  - continue glycemic control with insulin gtt    Skin:   - repositioning for DTI prevention while in bed    Heme/DVT Prophylaxis:   - heparin gtt re-started    Dispo:  - goals of care discussions with family remain ongoing, they agree that patient does not want a trach if he will not regain a mental status to the degree that it would provide him a meaningful quality of life  - patient's prognosis remains guarded for now  - care per SICU

## 2020-04-29 NOTE — CHART NOTE - NSCHARTNOTEFT_GEN_A_CORE
Source: Patient [ ]  Family [ ]   other [x ]    Current Diet: Diet, NPO with Tube Feed:   Tube Feeding Modality: Orogastric  Nepro  Total Volume for 24 Hours (mL): 1200  Continuous  Starting Tube Feed Rate {mL per Hour}: 20     Every 4 hours  Until Goal Tube Feed Rate (mL per Hour): 50  Tube Feed Duration (in Hours): 24  Tube Feed Start Time: 05:45  No Carb Prosource (1pkg = 15gms Protein)     Qty per Day:  1 (04-27-20 @ 12:11)      Patient reports [ ] nausea  [ ] vomiting [ ] diarrhea [ ] constipation  [ ]chewing problems [ ] swallowing issues  [ ] other:     PO intake:  < 50% [ ]   50-75%  [ ]   %  [ ]  other :    Source for PO intake [ ] Patient [ ] family [ ] chart [ ] staff [ ] other    Enteral /Parenteral Nutrition: Tube feeds at goal rate of 50 ml/hr (x20 hrs) + Prostat 30 ml once daily provides 1030 ml, 1900 kcal, 96g protein, 727 ml free water, and 100% of RDIs for vitamins/minerals. Receiving an additional 200 ml free water q6 hrs.     Current Weight:   (4/28) 304.8 lbs  (4/25) 310.4 lbs  (4/24) 299.1 lbs  (4/23) 305.1 lbs  (4/22) 308.6 lbs  (4/21) 301.3 lbs  (4/18) 301.1 lbs  (4/13) 290.3 lbs  (4/12) 294.9 lbs  (4/11) 300.9 lbs  (4/10) 303.3 lbs  (4/9) 292.4 lbs  (4/7) 272.4 lbs  (4/4) 276.9 lbs  (4/3) 276.9 lbs  ? accuracy of weights, noted with 1+ generalized edema, continue to trend and maintain strict Is&Os     Pertinent Medications: MEDICATIONS  (STANDING):  ALBUTerol    90 MICROgram(s) HFA Inhaler 6 Puff(s) Inhalation every 6 hours  aMIOdarone    Tablet 200 milliGRAM(s) Oral daily  amLODIPine   Tablet 5 milliGRAM(s) Oral daily  aspirin  chewable 81 milliGRAM(s) Oral daily  chlorhexidine 0.12% Liquid 15 milliLiter(s) Oral Mucosa two times a day  chlorhexidine 2% Cloths 1 Application(s) Topical daily  dexMEDEtomidine Infusion 0.3 MICROgram(s)/kG/Hr (9.42 mL/Hr) IV Continuous <Continuous>  dextrose 5%. 1000 milliLiter(s) (50 mL/Hr) IV Continuous <Continuous>  fluconAZOLE IVPB 400 milliGRAM(s) IV Intermittent every 24 hours  heparin  Infusion. 1800 Unit(s)/Hr (18 mL/Hr) IV Continuous <Continuous>  insulin glargine Injectable (LANTUS) 50 Unit(s) SubCutaneous at bedtime  insulin lispro (HumaLOG) corrective regimen sliding scale   SubCutaneous every 6 hours  linezolid  IVPB      meropenem  IVPB 1000 milliGRAM(s) IV Intermittent every 8 hours  pantoprazole   Suspension 40 milliGRAM(s) Oral daily  senna Syrup 10 milliLiter(s) Oral daily    MEDICATIONS  (PRN):  acetaminophen   Tablet .. 650 milliGRAM(s) Oral every 6 hours PRN Temp greater or equal to 38C (100.4F), Mild Pain (1 - 3)  fentaNYL    Injectable 50 MICROGram(s) IV Push every 4 hours PRN breakthrough pain  heparin   Injectable 68260 Unit(s) IV Push every 6 hours PRN For aPTT less than 40  heparin   Injectable 5000 Unit(s) IV Push every 6 hours PRN For aPTT between 40 - 57  hydrALAZINE Injectable 10 milliGRAM(s) IV Push every 4 hours PRN SBP>180  sodium chloride 0.9% lock flush 10 milliLiter(s) IV Push every 1 hour PRN Pre/post blood products, medications, blood draw, and to maintain line patency    Pertinent Labs: CBC Full  -  ( 29 Apr 2020 05:39 )  WBC Count : 6.37 K/uL  RBC Count : 2.77 M/uL  Hemoglobin : 8.3 g/dL  Hematocrit : 27.5 %  Platelet Count - Automated : 309 K/uL  Mean Cell Volume : 99.3 fl  Mean Cell Hemoglobin : 30.0 pg  Mean Cell Hemoglobin Concentration : 30.2 gm/dL  Auto Neutrophil # : 4.61 K/uL  Auto Lymphocyte # : 0.91 K/uL  Auto Monocyte # : 0.55 K/uL  Auto Eosinophil # : 0.02 K/uL  Auto Basophil # : 0.03 K/uL  Auto Neutrophil % : 72.4 %  Auto Lymphocyte % : 14.3 %  Auto Monocyte % : 8.6 %  Auto Eosinophil % : 0.3 %  Auto Basophil % : 0.5 %    04-29 Na144 mmol/L Glu 129 mg/dL<H> K+ 2.9 mmol/L<LL> Cr  0.92 mg/dL BUN 35.0 mg/dL<H> Phos 2.7 mg/dL Alb 2.0 g/dL<L> PAB n/a         Skin: Moisture associated dermatitis per documentation     Nutrition focused physical exam not conducted at this time- found signs of malnutrition [ ]absent [ ]present    Subcutaneous fat loss: [ ] Orbital fat pads region, [ ]Buccal fat region, [ ]Triceps region,  [ ]Ribs region    Muscle wasting: [ ]Temples region, [ ]Clavicle region, [ ]Shoulder region, [ ]Scapula region, [ ]Interosseous region,  [ ]thigh region, [ ]Calf region    Estimated Needs:   [x ] no change since previous assessment  [ ] recalculated:     Current Nutrition Diagnosis: Pt remains at high nutrition risk secondary to malnutrition (moderate, acute) related to inability to meet sufficient energy requirements in setting of COVID + with hypoxic respiratory failure, Hospital course complicated by superimposed bacterial PNA and most recently, fungemia, remains intubated on vent support as evidenced by meeting <75% nutrient needs >7 days and +fluid accumulation. Pt remains intubated/sedated, receiving Nepro at goal rate of 50 ml/hr, aware pt with hypokalemia at this time. Per documentation, pt may require trach.       Recommendations:   1) Replete K+ as needed; if pt remains with persistent hypokalemia, consider change tube feeds to Glucerna 1.5, goal rate of 60 ml/hr (x20 hrs) to provide 1200 ml, 1800 kcal, 99g protein, 759 ml free water, and >100% of RDIs for vitamins/minerals. Continue Prostat 30 ml once daily for an additional 100 kcal and 15g protein. Additional free water per MD discretion.  2) Rx: MVI and vit C 500mg daily.  3) Monitor tube feed tolerance.  4) Obtain daily weights as feasible to monitor trends.     Monitoring and Evaluation:   [ ] PO intake [x ] Tolerance to diet prescription [X] Weights  [X] Follow up per protocol [X] Labs

## 2020-04-29 NOTE — PROGRESS NOTE ADULT - ATTENDING COMMENTS
I have seen and examined patient during rounds' 8-10 am  Sedated, intubated  AC 23/490/10 0.5  Plan:  wean sedation as able  SAT, might require tracheostomy.  Fluconazole for candidemia  TEN

## 2020-04-29 NOTE — PROGRESS NOTE ADULT - SUBJECTIVE AND OBJECTIVE BOX
24h Events: Following simple commands on low dose precedex. Off insulin gtt - blood glucose overnight in 140s. Hematuria cleared. Remains hemodynamically normal.     ICU Vital Signs Last 24 Hrs  T(C): 37.4 (2020 00:36), Max: 38 (2020 03:26)  T(F): 99.3 (2020 00:36), Max: 100.4 (2020 03:26)  HR: 68 (2020 22:00) (54 - 95)  BP: --  BP(mean): --  ABP: 156/51 (2020 22:00) (126/48 - 193/62)  ABP(mean): 78 (2020 22:00) (67 - 97)  RR: 33 (:00) (20 - 37)  SpO2: 97% (2020 22:00) (94% - 100%)      I&O's Detail    2020 07:01  -  2020 07:00  --------------------------------------------------------  IN:    dexmedetomidine Infusion: 174.6 mL    Free Water: 400 mL    Glucerna 1.5: 250 mL    heparin  Infusion.: 441 mL    insulin regular Infusion: 129 mL    Nepro: 875 mL  Total IN: 2269.6 mL    OUT:    Indwelling Catheter - Urethral: 1740 mL  Total OUT: 1740 mL    Total NET: 529.6 mL      2020 07:01  -  2020 01:22  --------------------------------------------------------  IN:    dexmedetomidine Infusion: 147.4 mL    Enteral Tube Flush: 60 mL    Free Water: 600 mL    heparin  Infusion.: 336 mL    insulin regular Infusion: 50 mL    Nepro: 800 mL    Packed Red Blood Cells: 354 mL    Solution: 250 mL  Total IN: 2597.4 mL    OUT:    Indwelling Catheter - Urethral: 1265 mL  Total OUT: 1265 mL    Total NET: 1332.4 mL          ABG - ( 2020 04:08 )  pH, Arterial: 7.49  pH, Blood: x     /  pCO2: 43    /  pO2: 63    / HCO3: 33    / Base Excess: 8.8   /  SaO2: 95          MEDICATIONS  (STANDING):  ALBUTerol    90 MICROgram(s) HFA Inhaler 6 Puff(s) Inhalation every 6 hours  aMIOdarone    Tablet 200 milliGRAM(s) Oral daily  aspirin  chewable 81 milliGRAM(s) Oral daily  chlorhexidine 0.12% Liquid 15 milliLiter(s) Oral Mucosa two times a day  chlorhexidine 2% Cloths 1 Application(s) Topical daily  dexMEDEtomidine Infusion 0.3 MICROgram(s)/kG/Hr (9.42 mL/Hr) IV Continuous <Continuous>  dextrose 5%. 1000 milliLiter(s) (50 mL/Hr) IV Continuous <Continuous>  fluconAZOLE IVPB 400 milliGRAM(s) IV Intermittent every 24 hours  heparin  Infusion. 1800 Unit(s)/Hr (18 mL/Hr) IV Continuous <Continuous>  insulin glargine Injectable (LANTUS) 50 Unit(s) SubCutaneous at bedtime  insulin lispro (HumaLOG) corrective regimen sliding scale   SubCutaneous every 6 hours  pantoprazole   Suspension 40 milliGRAM(s) Oral daily  senna Syrup 10 milliLiter(s) Oral daily  sodium zirconium cyclosilicate 10 Gram(s) Oral three times a day    MEDICATIONS  (PRN):  acetaminophen   Tablet .. 650 milliGRAM(s) Oral every 6 hours PRN Temp greater or equal to 38C (100.4F), Mild Pain (1 - 3)  fentaNYL    Injectable 50 MICROGram(s) IV Push every 4 hours PRN breakthrough pain  heparin   Injectable 68714 Unit(s) IV Push every 6 hours PRN For aPTT less than 40  heparin   Injectable 5000 Unit(s) IV Push every 6 hours PRN For aPTT between 40 - 57  hydrALAZINE Injectable 10 milliGRAM(s) IV Push every 4 hours PRN SBP>180  sodium chloride 0.9% lock flush 10 milliLiter(s) IV Push every 1 hour PRN Pre/post blood products, medications, blood draw, and to maintain line patency        Physical Exam:    Neurological: RASS -2, following simple commands, opens eyes to voice, on precedex gtt for sedation & PRN fentanyl for analgesia    Pulmonary: Mode: AC/ CMV (Assist Control/ Continuous Mandatory Ventilation)  RR (machine): 20  TV (machine): 500  FiO2: 40  PEEP: 10  ITime: 0.7  MAP: 19  PIP: 36    Cardiovascular: S1, S2, regular rate & rhythm    Gastrointestinal: soft, non-tender, non-distended, no rebound / guarding    : brown in place, urine is yellow, clear, no hematuria or clots    Skin: warm & dry, + pitting edema, no diaphoresis, no pallor, cyanosis, or jaundice    LABS:  CBC Full  -  ( 2020 03:30 )  WBC Count : 7.50 K/uL  RBC Count : 2.33 M/uL  Hemoglobin : 7.1 g/dL  Hematocrit : 23.7 %  Platelet Count - Automated : 294 K/uL  Mean Cell Volume : 101.7 fl  Mean Cell Hemoglobin : 30.5 pg  Mean Cell Hemoglobin Concentration : 30.0 gm/dL  Auto Neutrophil # : 5.99 K/uL  Auto Lymphocyte # : 0.69 K/uL  Auto Monocyte # : 0.63 K/uL  Auto Eosinophil # : 0.00 K/uL  Auto Basophil # : 0.01 K/uL  Auto Neutrophil % : 79.9 %  Auto Lymphocyte % : 9.2 %  Auto Monocyte % : 8.4 %  Auto Eosinophil % : 0.0 %  Auto Basophil % : 0.1 %        142  |  102  |  40.0<H>  ----------------------------<  133<H>  4.0   |  28.0  |  1.12    Ca    8.3<L>      2020 03:30  Phos  2.7     -  Mg     2.2     -28      PT/INR - ( 2020 11:30 )   PT: 14.6 sec;   INR: 1.28 ratio         PTT - ( 2020 18:55 )  PTT:74.8 sec  Urinalysis Basic - ( 2020 12:29 )    Color: Yellow / Appearance: Clear / S.015 / pH: x  Gluc: x / Ketone: Negative  / Bili: Negative / Urobili: Negative mg/dL   Blood: x / Protein: 30 mg/dL / Nitrite: Negative   Leuk Esterase: Negative / RBC: 6-10 /HPF / WBC 0-2   Sq Epi: x / Non Sq Epi: Negative / Bacteria: Occasional      RECENT CULTURES:   .Sputum Sputum TRAP XXXX   Few Squamous epithelial cells per low power field  Moderate polymorphonuclear leukocytes per low power field  Moderate Gram positive cocci in pairs per oil power field XXXX    -25 .Blood Blood-Peripheral XXXX XXXX   No growth at 48 hours    -25 .Blood Blood-Peripheral XXXX XXXX   No growth at 48 hours          ASSESSMENT/PLAN:  75 y/o male with hypoxic respiratory failure 2/2 COVID. Hospital course complicated by superimposed bacterial PNA s/p course of IV abx, fungemia currently on fluconazole, poorly controlled diabetes now off insulin gtt, and hypernatremia which has now resolved.     Neuro: continue sedation / analgesia with low dose precedex & PRN dilaudid, delirium precautions, daily sedation holiday    CV: maintain MAP > 65, continue invasive monitoring with arterial line    Pulm: continue mechanical ventilation, wean FiO2 / PEEP as tolerated    GI/Nutrition: NPO with tube feeds - if prone while receiving TF keep in reverse trendelenberg     /Renal: brown for strict I&O, monitor kidney fxn    ID: fluconazole for fungemia (end ), repeat bcx sent  due to increasing insulin requirements, awaiting results    Endo: continue lantus & ISS at current doses, monitor POC     Skin: repositioning for DTI prevention while in bed    Heme/DVT Prophylaxis: SCDs / SQH for hypercoagulable state

## 2020-04-30 NOTE — PROGRESS NOTE ADULT - ATTENDING COMMENTS
The patient was seen and examined  Events noted   No new problems    Neurologic:  GCS=8-9T, precedex  Respiratory:  VC 50%, PEEP=+12; KJK=677  Hemodynamic: Normal  Renal:  Urine flow okay  GI:  TEN  Hematologic:  Hgb okay  ID:  No new issues    Plan:  Will decrease ventilator rate  Avoid alkalosis  Anticoagulation  Nutritional support

## 2020-04-30 NOTE — CHART NOTE - NSCHARTNOTEFT_GEN_A_CORE
Patient's son Claudio was provided an update on the patient's condition. All questions were answered and concerns addressed. Plan is to try to set up facetime with family tomorrow.

## 2020-04-30 NOTE — PROVIDER CONTACT NOTE (CRITICAL VALUE NOTIFICATION) - ACTION/TREATMENT ORDERED:
CCPA made aware, no intervention at this time
No new orders at this time
as per full anticoagulation nomogram....turn off Heparin drip for 1 hour and resume .. from 2300 units to 1900 units per hour
continue course of treatment: H20 flushed increased & IVF changed to LR will continue to monitor
Send another aPTT, hold the heparin until results return. Orders readback to PA.

## 2020-04-30 NOTE — PROGRESS NOTE ADULT - SUBJECTIVE AND OBJECTIVE BOX
24h Events: Opening eyes to voice and following simple commands. Continues to have P/F 140s - 150s and elevated blood glucose. Started on zyvox and merrem - sputum cx grew staph, ecoli, and citerobacter. Awaiting final culture & sensitivities. Hemodynamically well.    ICU Vital Signs Last 24 Hrs  T(C): 37.5 (30 Apr 2020 00:00), Max: 38.3 (29 Apr 2020 15:55)  T(F): 99.5 (30 Apr 2020 00:00), Max: 100.9 (29 Apr 2020 15:55)  HR: 59 (30 Apr 2020 01:36) (54 - 102)  BP: 188/79 (29 Apr 2020 09:41) (168/53 - 188/79)  BP(mean): 114 (29 Apr 2020 09:41) (82 - 114)  ABP: 133/54 (30 Apr 2020 00:00) (124/49 - 233/70)  ABP(mean): 74 (30 Apr 2020 00:00) (68 - 108)  RR: 24 (30 Apr 2020 00:00) (20 - 47)  SpO2: 97% (30 Apr 2020 01:36) (87% - 100%)      I&O's Detail    28 Apr 2020 07:01  -  29 Apr 2020 07:00  --------------------------------------------------------  IN:    dexmedetomidine Infusion: 213.2 mL    Enteral Tube Flush: 60 mL    Free Water: 600 mL    heparin  Infusion.: 483 mL    insulin regular Infusion: 50 mL    Nepro: 1150 mL    Packed Red Blood Cells: 354 mL    Solution: 250 mL  Total IN: 3160.2 mL    OUT:    Indwelling Catheter - Urethral: 1655 mL  Total OUT: 1655 mL    Total NET: 1505.2 mL      29 Apr 2020 07:01  -  30 Apr 2020 02:46  --------------------------------------------------------  IN:    dexmedetomidine Infusion: 117 mL    Free Water: 400 mL    heparin  Infusion.: 21 mL    heparin  Infusion.: 273 mL    Nepro: 750 mL    Solution: 200 mL  Total IN: 1761 mL    OUT:    Indwelling Catheter - Urethral: 1335 mL  Total OUT: 1335 mL    Total NET: 426 mL          ABG - ( 30 Apr 2020 02:18 )  pH, Arterial: 7.52  pH, Blood: x     /  pCO2: 38    /  pO2: 76    / HCO3: 32    / Base Excess: 8.0   /  SaO2: 97                  MEDICATIONS  (STANDING):  ALBUTerol    90 MICROgram(s) HFA Inhaler 6 Puff(s) Inhalation every 6 hours  aMIOdarone    Tablet 200 milliGRAM(s) Oral daily  amLODIPine   Tablet 5 milliGRAM(s) Oral daily  aspirin  chewable 81 milliGRAM(s) Oral daily  chlorhexidine 0.12% Liquid 15 milliLiter(s) Oral Mucosa two times a day  chlorhexidine 2% Cloths 1 Application(s) Topical daily  dexMEDEtomidine Infusion 0.3 MICROgram(s)/kG/Hr (9.42 mL/Hr) IV Continuous <Continuous>  dextrose 5%. 1000 milliLiter(s) (50 mL/Hr) IV Continuous <Continuous>  fluconAZOLE IVPB 400 milliGRAM(s) IV Intermittent every 24 hours  heparin  Infusion. 2100 Unit(s)/Hr (21 mL/Hr) IV Continuous <Continuous>  insulin glargine Injectable (LANTUS) 50 Unit(s) SubCutaneous at bedtime  insulin lispro (HumaLOG) corrective regimen sliding scale   SubCutaneous every 6 hours  linezolid  IVPB      linezolid  IVPB 600 milliGRAM(s) IV Intermittent every 12 hours  meropenem  IVPB 1000 milliGRAM(s) IV Intermittent every 8 hours  pantoprazole   Suspension 40 milliGRAM(s) Oral daily  senna Syrup 10 milliLiter(s) Oral daily    MEDICATIONS  (PRN):  acetaminophen   Tablet .. 650 milliGRAM(s) Oral every 6 hours PRN Temp greater or equal to 38C (100.4F), Mild Pain (1 - 3)  fentaNYL    Injectable 50 MICROGram(s) IV Push every 4 hours PRN breakthrough pain  heparin   Injectable 34794 Unit(s) IV Push every 6 hours PRN For aPTT less than 40  heparin   Injectable 5000 Unit(s) IV Push every 6 hours PRN For aPTT between 40 - 57  hydrALAZINE Injectable 10 milliGRAM(s) IV Push every 4 hours PRN SBP>180  sodium chloride 0.9% lock flush 10 milliLiter(s) IV Push every 1 hour PRN Pre/post blood products, medications, blood draw, and to maintain line patency        Physical Exam:    Neurological: RASS -1, on precedex for sedation    Pulmonary: Mode: AC/ CMV (Assist Control/ Continuous Mandatory Ventilation)  RR (machine): 28  TV (machine): 475  FiO2: 50  PEEP: 12  ITime: 0.7  MAP: 19  PIP: 35    Cardiovascular: S1, S2, regular rate & rhythm    Gastrointestinal: soft, non-tender, non-distended, no rebound / guarding    : brown in place draining yellow urine    Skin: warm & dry, no diaphoresis, no pallor, cyanosis, or jaundice    LABS:  CBC Full  -  ( 29 Apr 2020 05:39 )  WBC Count : 6.37 K/uL  RBC Count : 2.77 M/uL  Hemoglobin : 8.3 g/dL  Hematocrit : 27.5 %  Platelet Count - Automated : 309 K/uL  Mean Cell Volume : 99.3 fl  Mean Cell Hemoglobin : 30.0 pg  Mean Cell Hemoglobin Concentration : 30.2 gm/dL  Auto Neutrophil # : 4.61 K/uL  Auto Lymphocyte # : 0.91 K/uL  Auto Monocyte # : 0.55 K/uL  Auto Eosinophil # : 0.02 K/uL  Auto Basophil # : 0.03 K/uL  Auto Neutrophil % : 72.4 %  Auto Lymphocyte % : 14.3 %  Auto Monocyte % : 8.6 %  Auto Eosinophil % : 0.3 %  Auto Basophil % : 0.5 %    04-29    144  |  102  |  35.0<H>  ----------------------------<  129<H>  2.9<LL>   |  28.0  |  0.92    Ca    8.2<L>      29 Apr 2020 05:39  Phos  2.7     04-29  Mg     2.0     04-29    TPro  5.9<L>  /  Alb  2.0<L>  /  TBili  0.3<L>  /  DBili  x   /  AST  32  /  ALT  30  /  AlkPhos  54  04-29    PT/INR - ( 29 Apr 2020 05:39 )   PT: 15.2 sec;   INR: 1.33 ratio         PTT - ( 29 Apr 2020 05:39 )  PTT:80.0 sec    RECENT CULTURES:  04-28 .Sputum Sputum TRAP XXXX   Few Squamous epithelial cells per low power field  Moderate polymorphonuclear leukocytes per low power field  Moderate Gram positive cocci in pairs per oil power field   Numerous Staphylococcus aureus  Moderate Escherichia coli  Moderate Citrobacter koseri  Normal Respiratory Ro absent    04-25 .Blood Blood-Peripheral XXXX XXXX   No growth at 48 hours    04-25 .Blood Blood-Peripheral XXXX XXXX   No growth at 48 hours        LIVER FUNCTIONS - ( 29 Apr 2020 05:39 )  Alb: 2.0 g/dL / Pro: 5.9 g/dL / ALK PHOS: 54 U/L / ALT: 30 U/L / AST: 32 U/L / GGT: x           CARDIAC MARKERS ( 29 Apr 2020 05:39 )  x     / x     / 75 U/L / x     / x              ASSESSMENT/PLAN:  74y male COVID positive w/ hypoxic respiratory failure. Hospital course complicated by poorly controlled DM, fungemia, superimposed bacterial PNA.     Neuro: continue sedation w/ precedex, fentanyl PRN for pain, continue delirium precautions, daily sedation holiday    CV: norvasc & amiodarone for rate / rhythm control, no pressor requirements to maintain MAP > 65, continue invasive monitoring with arterial line    Pulm: continue mechanical ventilation, wean FiO2 / PEEP as tolerated    GI/Nutrition: NPO with tube feeds    /Renal: brown for strict I&O, monitor kidney fxn    ID: fluconazole ending 5/1 for fungemia, merrem & zyvox started 4/29, awaiting finalization of sputum cultures / sensitivities    Endo: ISS increased for elevated blood glucose    Skin: repositioning for DTI prevention while in bed    Heme/DVT Prophylaxis: SCDs / SQH for hypercoagulable state

## 2020-05-01 NOTE — PROGRESS NOTE ADULT - ATTENDING COMMENTS
73 yo M with PMH of DM, HTN, presents with ARDS due to COVID 19.  yesterday tube exchanged by anesthesia for cuff leak.  Pt tolerated well. Intubated, vented, and sedated. open eyes, MUHAMMAD, follow command, GCS 10T. PF ratio 154.  L CT with small intermittent leak .  Continue Amio and hep gtt for afib.   Continue wean vent as tolerated.  Continue sedation to enhance vent wean.  Continue to control BG  with lantus and high ISS   Fungemia and continue fluconazole, zyvox and cefepime  VTE ppx SCD/heparin   Continue ICU care for cardiopulmonary support         code [id number]       CCT 40 min .

## 2020-05-01 NOTE — PROGRESS NOTE ADULT - SUBJECTIVE AND OBJECTIVE BOX
[facility]                                                               Office: [address] [address]                                                              Telephone: [phone]. Fax:[phone]                                                                             PROGRESS NOTE  Reason for follow up: bradycardia  Overnight: No new events.   Update: 5/1: Reconsulted for bradycardia.  Discussed with MARIA ESTHER Ellington.  Patient remains intubated on Precedex. During course patient had rapid AFib w/RVR (likely r/t covid disease process), has been on Amiodarone, and has been NSR.  For the past week his rate has become bradycardic, average mid 50s with low of 51.  BP is high normal with occasional hypertension w/SBP>160.      Review of symptoms:   Due to the nature of this patient's COVID-19 isolation status, no bedside physical exam done to limit spread of infection.  Examination highlights were provided by bedside nurse. Objective data were reviewed in detail.     Past medical history: No updates.   	  Vitals:  T(C): 36.8 (05-01-20 @ 11:41), Max: 36.9 (05-01-20 @ 04:00)  HR: 58 (05-01-20 @ 14:00) (53 - 81)  BP: --  RR: 21 (05-01-20 @ 14:00) (20 - 26)  SpO2: 95% (05-01-20 @ 14:00) (90% - 100%)  Wt(kg): --  I&O's Summary    30 Apr 2020 07:01  -  01 May 2020 07:00  --------------------------------------------------------  IN: 2935.4 mL / OUT: 1225 mL / NET: 1710.4 mL    01 May 2020 07:01  -  01 May 2020 16:04  --------------------------------------------------------  IN: 1252.4 mL / OUT: 660 mL / NET: 592.4 mL            PHYSICAL EXAM:  Due to the nature of this patient's COVID-19 isolation status, no bedside physical exam done to limit spread of infection.  Examination highlights were provided by bedside nurse. Objective data were reviewed in detail.       CURRENT MEDICATIONS:  aMIOdarone    Tablet 200 milliGRAM(s) Oral daily  amLODIPine   Tablet 5 milliGRAM(s) Oral daily  hydrALAZINE Injectable 10 milliGRAM(s) IV Push every 4 hours PRN    ALBUTerol    90 MICROgram(s) HFA Inhaler  cefepime   IVPB  linezolid  IVPB  linezolid  IVPB  dexMEDEtomidine Infusion  pantoprazole   Suspension  senna Syrup  insulin glargine Injectable (LANTUS)  insulin glargine Injectable (LANTUS)  insulin lispro (HumaLOG) corrective regimen sliding scale  aspirin  chewable  chlorhexidine 0.12% Liquid  chlorhexidine 2% Cloths  dextrose 5%.  heparin  Infusion.      DIAGNOSTIC TESTING:  [ ] Echocardiogram:   < from: TTE Echo Complete w/o contrast w/ Doppler (04.19.20 @ 11:24) >  Summary:   1. Technically limited study.   2. Endocardial visualization was enhanced with intravenous echo contrast.   3. Left ventricular ejection fraction, by visual estimation, is 60 to 65%.   4. Normal global left ventricular systolic function.   5. Normal left ventricular internal cavity size.   6. There is moderate concentric left ventricular hypertrophy.   7. Mildly enlarged left atrium.   8. Mild mitral annular calcification.   9. Moderate thickening of the anterior and posterior mitral valve leaflets.  10. Mild mitral valve regurgitation.  11. Sclerotic aortic valve with normal opening.  12. There is no evidence of pericardial effusion.  13. No clear evidence of endocarditis in limited evaluation. Consider LAMONTE as clinically indicated.    MD Jennifer Electronically signed on 4/19/2020 at 1:55:17 PM    < end of copied text >    [ ]  Catheterization:    [ ] Stress Test:      OTHER: 	      LABS:	 	  CARDIAC MARKERS ( 29 Apr 2020 05:39 )  x     / x     / 75 U/L / x     / x      p-BNP 29 Apr 2020 05:39: x                              8.4    5.90  )-----------( 275      ( 01 May 2020 05:12 )             28.0     05-01    143  |  101  |  31.0<H>  ----------------------------<  192<H>  3.0<L>   |  28.0  |  0.89    Ca    8.6      01 May 2020 05:12  Phos  3.1     05-01  Mg     1.8     05-01    TPro  5.9<L>  /  Alb  2.1<L>  /  TBili  0.3<L>  /  DBili  x   /  AST  22  /  ALT  24  /  AlkPhos  53  05-01    proBNP:   Lipid Profile:   HgA1c:   TSH:       TELEMETRY: Sinus bradycardia 56, low of 51. Few PACs. short pause x2 vs missed beats

## 2020-05-01 NOTE — PROGRESS NOTE ADULT - SUBJECTIVE AND OBJECTIVE BOX
INTERVAL HPI/OVERNIGHT EVENTS:    Pt with audible air leak and low TV.  Unable to maintain cuff pressure.  Anesthesia performed tube exchange without issue.  Pt tolerated well.  PF remains lateral.  Continues to intermittently follow basic commands.      MEDICATIONS  (STANDING):  ALBUTerol    90 MICROgram(s) HFA Inhaler 6 Puff(s) Inhalation every 6 hours  aMIOdarone    Tablet 200 milliGRAM(s) Oral daily  amLODIPine   Tablet 5 milliGRAM(s) Oral daily  aspirin  chewable 81 milliGRAM(s) Oral daily  cefepime   IVPB 2000 milliGRAM(s) IV Intermittent every 8 hours  chlorhexidine 0.12% Liquid 15 milliLiter(s) Oral Mucosa two times a day  chlorhexidine 2% Cloths 1 Application(s) Topical daily  dexMEDEtomidine Infusion 0.3 MICROgram(s)/kG/Hr (9.42 mL/Hr) IV Continuous <Continuous>  dextrose 5%. 1000 milliLiter(s) (50 mL/Hr) IV Continuous <Continuous>  heparin  Infusion. 2100 Unit(s)/Hr (21 mL/Hr) IV Continuous <Continuous>  insulin glargine Injectable (LANTUS) 50 Unit(s) SubCutaneous at bedtime  insulin lispro (HumaLOG) corrective regimen sliding scale   SubCutaneous every 6 hours  linezolid  IVPB      linezolid  IVPB 600 milliGRAM(s) IV Intermittent every 12 hours  pantoprazole   Suspension 40 milliGRAM(s) Oral daily  senna Syrup 10 milliLiter(s) Oral daily    MEDICATIONS  (PRN):  acetaminophen   Tablet .. 650 milliGRAM(s) Oral every 6 hours PRN Temp greater or equal to 38C (100.4F), Mild Pain (1 - 3)  fentaNYL    Injectable 50 MICROGram(s) IV Push every 4 hours PRN breakthrough pain  heparin   Injectable 67901 Unit(s) IV Push every 6 hours PRN For aPTT less than 40  heparin   Injectable 5000 Unit(s) IV Push every 6 hours PRN For aPTT between 40 - 57  hydrALAZINE Injectable 10 milliGRAM(s) IV Push every 4 hours PRN SBP>180  sodium chloride 0.9% lock flush 10 milliLiter(s) IV Push every 1 hour PRN Pre/post blood products, medications, blood draw, and to maintain line patency      Drug Dosing Weight  Height (cm): 172 (03 Apr 2020 05:00)  Weight (kg): 125.6 (03 Apr 2020 05:00)  BMI (kg/m2): 42.5 (03 Apr 2020 05:00)  BSA (m2): 2.34 (03 Apr 2020 05:00)      PAST MEDICAL & SURGICAL HISTORY:  Diabetes mellitus  Essential hypertension  No significant past surgical history      ICU Vital Signs Last 24 Hrs  T(C): 36.9 (01 May 2020 04:00), Max: 37.2 (30 Apr 2020 08:00)  T(F): 98.4 (01 May 2020 04:00), Max: 99 (30 Apr 2020 08:00)  HR: 64 (01 May 2020 04:00) (53 - 81)  BP: --  BP(mean): --  ABP: 155/63 (01 May 2020 04:00) (121/53 - 278/278)  ABP(mean): 89 (01 May 2020 04:00) (72 - 278)  RR: 21 (01 May 2020 04:00) (20 - 26)  SpO2: 93% (01 May 2020 04:00) (90% - 100%)      ABG - ( 01 May 2020 04:12 )  pH, Arterial: 7.49  pH, Blood: x     /  pCO2: 42    /  pO2: 77    / HCO3: 32    / Base Excess: 7.9   /  SaO2: 98                  I&O's Detail    29 Apr 2020 07:01  -  30 Apr 2020 07:00  --------------------------------------------------------  IN:    dexmedetomidine Infusion: 117 mL    Free Water: 400 mL    heparin  Infusion.: 21 mL    heparin  Infusion.: 273 mL    Nepro: 750 mL    Solution: 200 mL  Total IN: 1761 mL    OUT:    Indwelling Catheter - Urethral: 1335 mL  Total OUT: 1335 mL    Total NET: 426 mL      30 Apr 2020 07:01  -  01 May 2020 05:44  --------------------------------------------------------  IN:    dexmedetomidine Infusion: 223.8 mL    Free Water: 400 mL    heparin  Infusion.: 441 mL    Nepro: 850 mL    Solution: 100 mL    Solution: 300 mL    Solution: 500 mL  Total IN: 2814.8 mL    OUT:    Indwelling Catheter - Urethral: 1225 mL  Total OUT: 1225 mL    Total NET: 1589.8 mL          Mode: AC/ CMV (Assist Control/ Continuous Mandatory Ventilation)  RR (machine): 20  TV (machine): 475  FiO2: 70  PEEP: 12  ITime: 0.7  MAP: 16  PIP: 28      Physical Exam:    Neurological:  Intubated and sedated.  Non-focal.  Moving all extremities.  No appreciable motor deficits    HEENT: PERRLA, no drainage or redness.     Neck: Neck supple, No JVD    Respiratory:  Mech vented.  Trachea midline, equal chest rise.  Breath Sounds equal bilateral    Cardiovascular: Regular rate & rhythm, normal S1, S2    Gastrointestinal: Soft, non-tender, normal bowel sounds    Extremities: No peripheral edema, No cyanosis, clubbing     Vascular: Equal and normal pulses: 2+ peripheral pulses throughout    Skin: No rashes    LABS:  CBC Full  -  ( 01 May 2020 05:12 )  WBC Count : 5.90 K/uL  RBC Count : 2.85 M/uL  Hemoglobin : 8.4 g/dL  Hematocrit : 28.0 %  Platelet Count - Automated : 275 K/uL  Mean Cell Volume : 98.2 fl  Mean Cell Hemoglobin : 29.5 pg  Mean Cell Hemoglobin Concentration : 30.0 gm/dL  Auto Neutrophil # : 4.19 K/uL  Auto Lymphocyte # : 0.89 K/uL  Auto Monocyte # : 0.49 K/uL  Auto Eosinophil # : 0.02 K/uL  Auto Basophil # : 0.02 K/uL  Auto Neutrophil % : 71.1 %  Auto Lymphocyte % : 15.1 %  Auto Monocyte % : 8.3 %  Auto Eosinophil % : 0.3 %  Auto Basophil % : 0.3 %    04-30    142  |  101  |  33.0<H>  ----------------------------<  181<H>  3.1<L>   |  27.0  |  1.03    Ca    8.3<L>      30 Apr 2020 05:00  Phos  2.7     04-30  Mg     1.9     04-30      PTT - ( 01 May 2020 05:12 )  PTT:109.5 sec

## 2020-05-02 NOTE — PROGRESS NOTE ADULT - NSHPATTENDINGPLANDISCUSS_GEN_ALL_CORE
Family, SICU staff, all questions answered
SICU staff, all questions answered
SICU staff, all questions answered
SICU staff, all questions asnwered
SICU Staff, all questions answered
SICU staff, all questions answered

## 2020-05-02 NOTE — PROGRESS NOTE ADULT - ATTENDING COMMENTS
I have seen and examined the patient during SICU rounds 8-10 am  more awake, follows simple commands  VC20/475/12/0.4  Plan:  Wean sedation, only on precedex.  CV: perfusion is adequate, off pressors  Pulm: STB, transition to PSV P;F197, ventilation adequate, will discuss wuith family trach.  GI: TEN  : function preserved  ID: complete and for PNA (cefepime/zyvox)  Hem: hep drip

## 2020-05-02 NOTE — PROGRESS NOTE ADULT - SUBJECTIVE AND OBJECTIVE BOX
INTERVAL HPI/OVERNIGHT EVENTS/SUBJECTIVE: ETT Changed.  Still glucose elevated. Pt more awake and following commands now.  Transitioned to Glucerna Lantus started.      ICU Vital Signs Last 24 Hrs  T(C): 36.7 (01 May 2020 20:45), Max: 36.9 (01 May 2020 04:00)  T(F): 98.1 (01 May 2020 20:45), Max: 98.4 (01 May 2020 04:00)  HR: 57 (02 May 2020 00:20) (54 - 64)  BP: --  BP(mean): --  ABP: 158/63 (02 May 2020 00:00) (130/53 - 278/278)  ABP(mean): 92 (02 May 2020 00:00) (75 - 278)  RR: 20 (02 May 2020 00:00) (20 - 23)  SpO2: 98% (02 May 2020 00:20) (93% - 98%)      I&O's Detail    30 Apr 2020 07:01  -  01 May 2020 07:00  --------------------------------------------------------  IN:    dexmedetomidine Infusion: 258.4 mL    Free Water: 400 mL    heparin  Infusion.: 477 mL    Nepro: 900 mL    Solution: 100 mL    Solution: 300 mL    Solution: 500 mL  Total IN: 2935.4 mL    OUT:    Indwelling Catheter - Urethral: 1225 mL  Total OUT: 1225 mL    Total NET: 1710.4 mL      01 May 2020 07:01  -  02 May 2020 01:32  --------------------------------------------------------  IN:    dexmedetomidine Infusion: 242.2 mL    Enteral Tube Flush: 100 mL    Free Water: 200 mL    Glucerna 1.5: 264 mL    heparin  Infusion.: 252 mL    Nepro: 150 mL    Solution: 300 mL    Solution: 50 mL    Solution: 550 mL    Solution: 50 mL  Total IN: 2158.2 mL    OUT:    Indwelling Catheter - Urethral: 915 mL  Total OUT: 915 mL    Total NET: 1243.2 mL          Mode: AC/ CMV (Assist Control/ Continuous Mandatory Ventilation)  RR (machine): 20  TV (machine): 475  FiO2: 40  PEEP: 12  ITime: 0.7  MAP: 16  PIP: 28    ABG - ( 01 May 2020 04:12 )  pH, Arterial: 7.49  pH, Blood: x     /  pCO2: 42    /  pO2: 77    / HCO3: 32    / Base Excess: 7.9   /  SaO2: 98                  MEDICATIONS  (STANDING):  ALBUTerol    90 MICROgram(s) HFA Inhaler 6 Puff(s) Inhalation every 6 hours  aMIOdarone    Tablet 200 milliGRAM(s) Oral daily  amLODIPine   Tablet 5 milliGRAM(s) Oral daily  aspirin  chewable 81 milliGRAM(s) Oral daily  cefepime   IVPB 2000 milliGRAM(s) IV Intermittent every 8 hours  chlorhexidine 0.12% Liquid 15 milliLiter(s) Oral Mucosa two times a day  chlorhexidine 2% Cloths 1 Application(s) Topical daily  dexMEDEtomidine Infusion 0.3 MICROgram(s)/kG/Hr (9.42 mL/Hr) IV Continuous <Continuous>  dextrose 5%. 1000 milliLiter(s) (50 mL/Hr) IV Continuous <Continuous>  heparin  Infusion. 2100 Unit(s)/Hr (21 mL/Hr) IV Continuous <Continuous>  insulin glargine Injectable (LANTUS) 50 Unit(s) SubCutaneous every morning  insulin glargine Injectable (LANTUS) 50 Unit(s) SubCutaneous at bedtime  insulin lispro (HumaLOG) corrective regimen sliding scale   SubCutaneous every 4 hours  linezolid  IVPB      linezolid  IVPB 600 milliGRAM(s) IV Intermittent every 12 hours  pantoprazole   Suspension 40 milliGRAM(s) Oral daily  senna Syrup 10 milliLiter(s) Oral daily    MEDICATIONS  (PRN):  acetaminophen   Tablet .. 650 milliGRAM(s) Oral every 6 hours PRN Temp greater or equal to 38C (100.4F), Mild Pain (1 - 3)  fentaNYL    Injectable 50 MICROGram(s) IV Push every 4 hours PRN breakthrough pain  heparin   Injectable 22447 Unit(s) IV Push every 6 hours PRN For aPTT less than 40  heparin   Injectable 5000 Unit(s) IV Push every 6 hours PRN For aPTT between 40 - 57  hydrALAZINE Injectable 10 milliGRAM(s) IV Push every 4 hours PRN SBP>180  sodium chloride 0.9% lock flush 10 milliLiter(s) IV Push every 1 hour PRN Pre/post blood products, medications, blood draw, and to maintain line patency      NUTRITION/IVF: Glucerna / IVL     PHYSICAL EXAM:     Gen: No cyanosis or Pallor.    Eyes: PERRL ~ 3mm, EOMI     Neurological: GCS 2/1T/6, No focal deficit.     Neck: Supple. NT AT, FROM no pain.  No JVD. No meningeal signs    Pulmonary: NAD, CTA, = BL.      Cardiovascular: RRR, S1, S2, No Murmurs, rubs or gallops noted.    Gastrointestinal: ND, Soft, NT.    Extremities: NT, AT, no edema, erythema or palpable cord noted.  FROM, = 2+ pulses throughout.      LABS:  CBC Full  -  ( 01 May 2020 05:12 )  WBC Count : 5.90 K/uL  RBC Count : 2.85 M/uL  Hemoglobin : 8.4 g/dL  Hematocrit : 28.0 %  Platelet Count - Automated : 275 K/uL  Mean Cell Volume : 98.2 fl  Mean Cell Hemoglobin : 29.5 pg  Mean Cell Hemoglobin Concentration : 30.0 gm/dL  Auto Neutrophil # : 4.19 K/uL  Auto Lymphocyte # : 0.89 K/uL  Auto Monocyte # : 0.49 K/uL  Auto Eosinophil # : 0.02 K/uL  Auto Basophil # : 0.02 K/uL  Auto Neutrophil % : 71.1 %  Auto Lymphocyte % : 15.1 %  Auto Monocyte % : 8.3 %  Auto Eosinophil % : 0.3 %  Auto Basophil % : 0.3 %    05-01    143  |  101  |  31.0<H>  ----------------------------<  192<H>  3.0<L>   |  28.0  |  0.89    Ca    8.6      01 May 2020 05:12  Phos  3.1     05-01  Mg     1.8     05-01    TPro  5.9<L>  /  Alb  2.1<L>  /  TBili  0.3<L>  /  DBili  x   /  AST  22  /  ALT  24  /  AlkPhos  53  05-01    PTT - ( 01 May 2020 17:53 )  PTT:96.4 sec    RECENT CULTURES:  04-28 .Blood Blood XXXX XXXX   No growth at 48 hours    04-28 .Blood Blood XXXX XXXX   No growth at 48 hours    04-28 .Sputum Sputum TRAP Methicillin resistant Staphylococcus aureus  Escherichia coli  Citrobacter koseri   Few Squamous epithelial cells per low power field  Moderate polymorphonuclear leukocytes per low power field  Moderate Gram positive cocci in pairs per oil power field   Numerous Methicillin resistant Staphylococcus aureus  Moderate Escherichia coli  Moderate Citrobacter koseri  Normal Respiratory Ro absent    04-25 .Blood Blood-Peripheral XXXX XXXX   No growth at 5 days.    04-25 .Blood Blood-Peripheral XXXX XXXX   No growth at 5 days.        LIVER FUNCTIONS - ( 01 May 2020 05:12 )  Alb: 2.1 g/dL / Pro: 5.9 g/dL / ALK PHOS: 53 U/L / ALT: 24 U/L / AST: 22 U/L / GGT: x               CAPILLARY BLOOD GLUCOSE      RADIOLOGY & ADDITIONAL STUDIES:    ASSESSMENT/PLAN:  74yMale presenting with: COVID-19 - hypoxic respiratory failure, DM hyperglycemia, Chronic HTN, PNA - Ecoli, citerobacter, MRSA, Fungemia.    Neurological: CW Precedex.  Wean Precedex as able for improved mentation.     Pulmonary: Will FU AM ABG.  If PF > 160, will begin to wean PEEP slowly.  When able to get <10 would highly consider Trach.  Family verbally agreed to this over phone last night with Me. Maintain Pplat < 30 and Attempt low rate SIMV and PSV  as able.     Cardiovascular: CW Amio.  Appreciate Cards Note and recs. Maintain MAPs >65.     Gastrointestinal: Glucerna TF At goal    Genitourinary: Trend Cr and U/O.    Heme: Hep drip.  PTT Full AC.  Transition to PO After acute phase.    ID: Cefepime and Zyvox x 7 for PNA.        Dispo: Critical care as above.             CRITICAL CARE TIME SPENT:  Critical care time spent: 60 minutes of critical care time spent providing medical care for patient's acute illness/conditions that impairs at least one vital organ system and/or poses a high risk of imminent or life threatening deterioration in the patient's condition. It includes time spent evaluating and treating the patient's acute illness as well as time spent reviewing labs, radiology, discussing goals of care with patient and/or patient's family, and discussing the case with a multidisciplinary team in an effort to prevent further life threatening deterioration or end organ damage. This time is independent of any procedures performed. INTERVAL HPI/OVERNIGHT EVENTS/SUBJECTIVE: ETT Changed.  Still glucose elevated. Pt more awake and following commands now.  Transitioned to Glucerna Lantus started.      ICU Vital Signs Last 24 Hrs  T(C): 36.7 (01 May 2020 20:45), Max: 36.9 (01 May 2020 04:00)  T(F): 98.1 (01 May 2020 20:45), Max: 98.4 (01 May 2020 04:00)  HR: 57 (02 May 2020 00:20) (54 - 64)  BP: --  BP(mean): --  ABP: 158/63 (02 May 2020 00:00) (130/53 - 278/278)  ABP(mean): 92 (02 May 2020 00:00) (75 - 278)  RR: 20 (02 May 2020 00:00) (20 - 23)  SpO2: 98% (02 May 2020 00:20) (93% - 98%)      I&O's Detail    30 Apr 2020 07:01  -  01 May 2020 07:00  --------------------------------------------------------  IN:    dexmedetomidine Infusion: 258.4 mL    Free Water: 400 mL    heparin  Infusion.: 477 mL    Nepro: 900 mL    Solution: 100 mL    Solution: 300 mL    Solution: 500 mL  Total IN: 2935.4 mL    OUT:    Indwelling Catheter - Urethral: 1225 mL  Total OUT: 1225 mL    Total NET: 1710.4 mL      01 May 2020 07:01  -  02 May 2020 01:32  --------------------------------------------------------  IN:    dexmedetomidine Infusion: 242.2 mL    Enteral Tube Flush: 100 mL    Free Water: 200 mL    Glucerna 1.5: 264 mL    heparin  Infusion.: 252 mL    Nepro: 150 mL    Solution: 300 mL    Solution: 50 mL    Solution: 550 mL    Solution: 50 mL  Total IN: 2158.2 mL    OUT:    Indwelling Catheter - Urethral: 915 mL  Total OUT: 915 mL    Total NET: 1243.2 mL          Mode: AC/ CMV (Assist Control/ Continuous Mandatory Ventilation)  RR (machine): 20  TV (machine): 475  FiO2: 40  PEEP: 12  ITime: 0.7  MAP: 16  PIP: 28    ABG - ( 01 May 2020 04:12 )  pH, Arterial: 7.49  pH, Blood: x     /  pCO2: 42    /  pO2: 77    / HCO3: 32    / Base Excess: 7.9   /  SaO2: 98                  MEDICATIONS  (STANDING):  ALBUTerol    90 MICROgram(s) HFA Inhaler 6 Puff(s) Inhalation every 6 hours  aMIOdarone    Tablet 200 milliGRAM(s) Oral daily  amLODIPine   Tablet 5 milliGRAM(s) Oral daily  aspirin  chewable 81 milliGRAM(s) Oral daily  cefepime   IVPB 2000 milliGRAM(s) IV Intermittent every 8 hours  chlorhexidine 0.12% Liquid 15 milliLiter(s) Oral Mucosa two times a day  chlorhexidine 2% Cloths 1 Application(s) Topical daily  dexMEDEtomidine Infusion 0.3 MICROgram(s)/kG/Hr (9.42 mL/Hr) IV Continuous <Continuous>  dextrose 5%. 1000 milliLiter(s) (50 mL/Hr) IV Continuous <Continuous>  heparin  Infusion. 2100 Unit(s)/Hr (21 mL/Hr) IV Continuous <Continuous>  insulin glargine Injectable (LANTUS) 50 Unit(s) SubCutaneous every morning  insulin glargine Injectable (LANTUS) 50 Unit(s) SubCutaneous at bedtime  insulin lispro (HumaLOG) corrective regimen sliding scale   SubCutaneous every 4 hours  linezolid  IVPB      linezolid  IVPB 600 milliGRAM(s) IV Intermittent every 12 hours  pantoprazole   Suspension 40 milliGRAM(s) Oral daily  senna Syrup 10 milliLiter(s) Oral daily    MEDICATIONS  (PRN):  acetaminophen   Tablet .. 650 milliGRAM(s) Oral every 6 hours PRN Temp greater or equal to 38C (100.4F), Mild Pain (1 - 3)  fentaNYL    Injectable 50 MICROGram(s) IV Push every 4 hours PRN breakthrough pain  heparin   Injectable 24566 Unit(s) IV Push every 6 hours PRN For aPTT less than 40  heparin   Injectable 5000 Unit(s) IV Push every 6 hours PRN For aPTT between 40 - 57  hydrALAZINE Injectable 10 milliGRAM(s) IV Push every 4 hours PRN SBP>180  sodium chloride 0.9% lock flush 10 milliLiter(s) IV Push every 1 hour PRN Pre/post blood products, medications, blood draw, and to maintain line patency      NUTRITION/IVF: Glucerna / IVL     PHYSICAL EXAM:     Gen: No cyanosis or Pallor.    Eyes: PERRL ~ 3mm, EOMI     Neurological: GCS 2/1T/6, No focal deficit.     Neck: Supple. NT AT, FROM no pain.  No JVD. No meningeal signs    Pulmonary: NAD, CTA, = BL.      Cardiovascular: RRR, S1, S2, No Murmurs, rubs or gallops noted.    Gastrointestinal: ND, Soft, NT.    Extremities: NT, AT, no edema, erythema or palpable cord noted.  FROM, = 2+ pulses throughout.      LABS:  CBC Full  -  ( 01 May 2020 05:12 )  WBC Count : 5.90 K/uL  RBC Count : 2.85 M/uL  Hemoglobin : 8.4 g/dL  Hematocrit : 28.0 %  Platelet Count - Automated : 275 K/uL  Mean Cell Volume : 98.2 fl  Mean Cell Hemoglobin : 29.5 pg  Mean Cell Hemoglobin Concentration : 30.0 gm/dL  Auto Neutrophil # : 4.19 K/uL  Auto Lymphocyte # : 0.89 K/uL  Auto Monocyte # : 0.49 K/uL  Auto Eosinophil # : 0.02 K/uL  Auto Basophil # : 0.02 K/uL  Auto Neutrophil % : 71.1 %  Auto Lymphocyte % : 15.1 %  Auto Monocyte % : 8.3 %  Auto Eosinophil % : 0.3 %  Auto Basophil % : 0.3 %    05-01    143  |  101  |  31.0<H>  ----------------------------<  192<H>  3.0<L>   |  28.0  |  0.89    Ca    8.6      01 May 2020 05:12  Phos  3.1     05-01  Mg     1.8     05-01    TPro  5.9<L>  /  Alb  2.1<L>  /  TBili  0.3<L>  /  DBili  x   /  AST  22  /  ALT  24  /  AlkPhos  53  05-01    PTT - ( 01 May 2020 17:53 )  PTT:96.4 sec    RECENT CULTURES:  04-28 .Blood Blood XXXX XXXX   No growth at 48 hours    04-28 .Blood Blood XXXX XXXX   No growth at 48 hours    04-28 .Sputum Sputum TRAP Methicillin resistant Staphylococcus aureus  Escherichia coli  Citrobacter koseri   Few Squamous epithelial cells per low power field  Moderate polymorphonuclear leukocytes per low power field  Moderate Gram positive cocci in pairs per oil power field   Numerous Methicillin resistant Staphylococcus aureus  Moderate Escherichia coli  Moderate Citrobacter koseri  Normal Respiratory Ro absent    04-25 .Blood Blood-Peripheral XXXX XXXX   No growth at 5 days.    04-25 .Blood Blood-Peripheral XXXX XXXX   No growth at 5 days.        LIVER FUNCTIONS - ( 01 May 2020 05:12 )  Alb: 2.1 g/dL / Pro: 5.9 g/dL / ALK PHOS: 53 U/L / ALT: 24 U/L / AST: 22 U/L / GGT: x               CAPILLARY BLOOD GLUCOSE      RADIOLOGY & ADDITIONAL STUDIES:    ASSESSMENT/PLAN:  74yMale presenting with: COVID-19 - hypoxic respiratory failure, DM hyperglycemia, Chronic HTN, PNA - Ecoli, citerobacter, MRSA, Fungemia.    Neurological: CW Precedex.  Wean Precedex as able for improved mentation.     Pulmonary: Will FU AM ABG.  If PF > 160, will begin to wean PEEP slowly.  When able to get <10 would highly consider Trach.  Family verbally agreed to this over phone last night with Me. Maintain Pplat < 30 and Attempt low rate SIMV and PSV  as able.     Cardiovascular: CW Amio.  Appreciate Cards Note and recs. Maintain MAPs >65.     Gastrointestinal: Glucerna TF At goal    Endo: Lantus increased further.  ISS Made Q 4 from Q 6.   If can not maintain <200, will start Infusion.     Genitourinary: Trend Cr and U/O.    Heme: Hep drip.  PTT Full AC.  Transition to PO After acute phase.    ID: Cefepime and Zyvox x 7 for PNA.        Dispo: Critical care as above.             CRITICAL CARE TIME SPENT:  Critical care time spent: 60 minutes of critical care time spent providing medical care for patient's acute illness/conditions that impairs at least one vital organ system and/or poses a high risk of imminent or life threatening deterioration in the patient's condition. It includes time spent evaluating and treating the patient's acute illness as well as time spent reviewing labs, radiology, discussing goals of care with patient and/or patient's family, and discussing the case with a multidisciplinary team in an effort to prevent further life threatening deterioration or end organ damage. This time is independent of any procedures performed.

## 2020-05-03 NOTE — PROGRESS NOTE ADULT - SUBJECTIVE AND OBJECTIVE BOX
INTERVAL HPI/OVERNIGHT EVENTS:    Pt with continued improvement of mental status.  Sedation maintained with Precedex gtt.  Continues to tolerate PSV and PEEP wean with stable PF.  Tolerating tube feeds.  Glycemic control improving.       MEDICATIONS  (STANDING):  ALBUTerol    90 MICROgram(s) HFA Inhaler 6 Puff(s) Inhalation every 6 hours  aMIOdarone    Tablet 200 milliGRAM(s) Oral daily  amLODIPine   Tablet 5 milliGRAM(s) Oral daily  aspirin  chewable 81 milliGRAM(s) Oral daily  cefepime   IVPB 2000 milliGRAM(s) IV Intermittent every 8 hours  chlorhexidine 0.12% Liquid 15 milliLiter(s) Oral Mucosa two times a day  chlorhexidine 2% Cloths 1 Application(s) Topical daily  dexMEDEtomidine Infusion 0.3 MICROgram(s)/kG/Hr (9.42 mL/Hr) IV Continuous <Continuous>  dextrose 5%. 1000 milliLiter(s) (50 mL/Hr) IV Continuous <Continuous>  heparin  Infusion. 2100 Unit(s)/Hr (21 mL/Hr) IV Continuous <Continuous>  insulin glargine Injectable (LANTUS) 50 Unit(s) SubCutaneous every morning  insulin glargine Injectable (LANTUS) 50 Unit(s) SubCutaneous at bedtime  insulin lispro (HumaLOG) corrective regimen sliding scale   SubCutaneous every 4 hours  linezolid  IVPB      linezolid  IVPB 600 milliGRAM(s) IV Intermittent every 12 hours  pantoprazole   Suspension 40 milliGRAM(s) Oral daily  senna Syrup 10 milliLiter(s) Oral daily    MEDICATIONS  (PRN):  acetaminophen   Tablet .. 650 milliGRAM(s) Oral every 6 hours PRN Temp greater or equal to 38C (100.4F), Mild Pain (1 - 3)  heparin   Injectable 62707 Unit(s) IV Push every 6 hours PRN For aPTT less than 40  heparin   Injectable 5000 Unit(s) IV Push every 6 hours PRN For aPTT between 40 - 57  hydrALAZINE Injectable 10 milliGRAM(s) IV Push every 4 hours PRN SBP>180  sodium chloride 0.9% lock flush 10 milliLiter(s) IV Push every 1 hour PRN Pre/post blood products, medications, blood draw, and to maintain line patency      Drug Dosing Weight  Height (cm): 172 (03 Apr 2020 05:00)  Weight (kg): 125.6 (03 Apr 2020 05:00)  BMI (kg/m2): 42.5 (03 Apr 2020 05:00)  BSA (m2): 2.34 (03 Apr 2020 05:00)      PAST MEDICAL & SURGICAL HISTORY:  Diabetes mellitus  Essential hypertension  No significant past surgical history      ICU Vital Signs Last 24 Hrs  T(C): 37.1 (02 May 2020 23:51), Max: 37.1 (02 May 2020 23:51)  T(F): 98.8 (02 May 2020 23:51), Max: 98.8 (02 May 2020 23:51)  HR: 70 (03 May 2020 00:38) (54 - 78)  BP: --  BP(mean): --  ABP: 172/71 (03 May 2020 00:00) (141/59 - 185/58)  ABP(mean): 103 (03 May 2020 00:00) (84 - 103)  RR: 22 (03 May 2020 00:00) (20 - 28)  SpO2: 97% (03 May 2020 00:38) (85% - 98%)      ABG - ( 02 May 2020 03:59 )  pH, Arterial: 7.46  pH, Blood: x     /  pCO2: 44    /  pO2: 79    / HCO3: 31    / Base Excess: 6.9   /  SaO2: 97                  I&O's Detail    01 May 2020 07:01  -  02 May 2020 07:00  --------------------------------------------------------  IN:    dexmedetomidine Infusion: 415.2 mL    Enteral Tube Flush: 100 mL    Free Water: 400 mL    Glucerna 1.5: 504 mL    heparin  Infusion.: 432 mL    Nepro: 150 mL    Solution: 300 mL    Solution: 50 mL    Solution: 550 mL    Solution: 50 mL  Total IN: 2951.2 mL    OUT:    Indwelling Catheter - Urethral: 1725 mL  Total OUT: 1725 mL    Total NET: 1226.2 mL      02 May 2020 07:01  -  03 May 2020 02:50  --------------------------------------------------------  IN:    dexmedetomidine Infusion: 346 mL    Enteral Tube Flush: 100 mL    Free Water: 650 mL    Glucerna 1.5: 480 mL    heparin  Infusion.: 330 mL    Solution: 100 mL    Solution: 300 mL  Total IN: 2306 mL    OUT:    Indwelling Catheter - Urethral: 1435 mL  Total OUT: 1435 mL    Total NET: 871 mL          Mode: CPAP with PS  FiO2: 40  PEEP: 10  PS: 14  ITime: 0.7  MAP: 12      Physical Exam:    Neurological:  GCS 11T.  Intubated and sedated.  Non-focal.  Moving all extremities.  No appreciable motor deficits    HEENT: PERRLA, no drainage or redness.     Neck: Neck supple, No JVD    Respiratory:  Mech vented.  Trachea midline, equal chest rise.  Breath Sounds equal bilateral    Cardiovascular: Regular rate & rhythm, normal S1, S2    Gastrointestinal: Soft, non-tender, normal bowel sounds    Extremities:  Anasarca    Vascular: Equal and normal pulses: 2+ peripheral pulses throughout      LABS:  CBC Full  -  ( 02 May 2020 04:30 )  WBC Count : 5.16 K/uL  RBC Count : 2.97 M/uL  Hemoglobin : 8.9 g/dL  Hematocrit : 29.2 %  Platelet Count - Automated : 267 K/uL  Mean Cell Volume : 98.3 fl  Mean Cell Hemoglobin : 30.0 pg  Mean Cell Hemoglobin Concentration : 30.5 gm/dL  Auto Neutrophil # : 3.52 K/uL  Auto Lymphocyte # : 0.99 K/uL  Auto Monocyte # : 0.35 K/uL  Auto Eosinophil # : 0.02 K/uL  Auto Basophil # : 0.03 K/uL  Auto Neutrophil % : 68.2 %  Auto Lymphocyte % : 19.2 %  Auto Monocyte % : 6.8 %  Auto Eosinophil % : 0.4 %  Auto Basophil % : 0.6 %    05-02    141  |  101  |  26.0<H>  ----------------------------<  191<H>  4.0   |  26.0  |  0.88    Ca    8.4<L>      02 May 2020 04:30  Phos  3.1     05-02  Mg     2.1     05-02    TPro  5.9<L>  /  Alb  2.1<L>  /  TBili  0.3<L>  /  DBili  x   /  AST  22  /  ALT  23  /  AlkPhos  55  05-02    PT/INR - ( 02 May 2020 17:25 )   PT: 15.2 sec;   INR: 1.33 ratio         PTT - ( 02 May 2020 23:05 )  PTT:98.4 sec

## 2020-05-03 NOTE — PROGRESS NOTE ADULT - SUBJECTIVE AND OBJECTIVE BOX
[place] [facility]                                                        Office: [address] [address]                                                       Telephone: [phone]. Fax:[phone]                                                                             PROGRESS NOTE    Subjective:  Patient is COVID unit on Vent     Review of symptoms:       	  Vitals:  T(C): 37 (05-03-20 @ 11:15), Max: 37.2 (05-03-20 @ 02:00)  HR: 68 (05-03-20 @ 12:35) (54 - 79)  BP: --  RR: 26 (05-03-20 @ 12:00) (22 - 36)  SpO2: 97% (05-03-20 @ 12:35) (85% - 98%)  Wt(kg): --  I&O's Summary    02 May 2020 07:01  -  03 May 2020 07:00  --------------------------------------------------------  IN: 3131.2 mL / OUT: 1785 mL / NET: 1346.2 mL    03 May 2020 07:01  -  03 May 2020 15:49  --------------------------------------------------------  IN: 594.1 mL / OUT: 700 mL / NET: -105.9 mL          PHYSICAL EXAM:  Patient not examined due to COVID +,   Refer to exam by Critical care.  CURRENT MEDICATIONS:    MEDICATIONS  (STANDING):  ALBUTerol    90 MICROgram(s) HFA Inhaler 6 Puff(s) Inhalation every 6 hours  aMIOdarone    Tablet 200 milliGRAM(s) Oral daily  amLODIPine   Tablet 5 milliGRAM(s) Oral daily  aspirin  chewable 81 milliGRAM(s) Oral daily  cefepime   IVPB 2000 milliGRAM(s) IV Intermittent every 8 hours  chlorhexidine 0.12% Liquid 15 milliLiter(s) Oral Mucosa two times a day  chlorhexidine 2% Cloths 1 Application(s) Topical daily  dexMEDEtomidine Infusion 0.3 MICROgram(s)/kG/Hr (9.42 mL/Hr) IV Continuous <Continuous>  dextrose 5%. 1000 milliLiter(s) (50 mL/Hr) IV Continuous <Continuous>  heparin  Infusion. 2100 Unit(s)/Hr (21 mL/Hr) IV Continuous <Continuous>  hydrocortisone sodium succinate Injectable 50 milliGRAM(s) IV Push every 12 hours  insulin glargine Injectable (LANTUS) 50 Unit(s) SubCutaneous every morning  insulin glargine Injectable (LANTUS) 50 Unit(s) SubCutaneous at bedtime  insulin lispro (HumaLOG) corrective regimen sliding scale   SubCutaneous every 4 hours  linezolid  IVPB      linezolid  IVPB 600 milliGRAM(s) IV Intermittent every 12 hours  pantoprazole   Suspension 40 milliGRAM(s) Oral daily  senna Syrup 10 milliLiter(s) Oral daily      LABS:	 	                          8.7    6.92  )-----------( 249      ( 03 May 2020 04:49 )             28.8     05-03    141  |  102  |  22.0<H>  ----------------------------<  104<H>  4.3   |  28.0  |  0.80    Ca    8.0<L>      03 May 2020 04:49  Phos  3.3     05-03  Mg     1.9     05-03    TPro  5.9<L>  /  Alb  2.1<L>  /  TBili  0.3<L>  /  DBili  x   /  AST  22  /  ALT  23  /  AlkPhos  55  05-02    proBNP:   Lipid Profile:       LIVER FUNCTIONS - ( 02 May 2020 04:30 )  Alb: 2.1 g/dL / Pro: 5.9 g/dL / ALK PHOS: 55 U/L / ALT: 23 U/L / AST: 22 U/L / GGT: x             TELEMETRY: Reviewed  - No significant arrhythmias    ECG:

## 2020-05-03 NOTE — PROGRESS NOTE ADULT - SUBJECTIVE AND OBJECTIVE BOX
INTERVAL HPI/OVERNIGHT EVENTS/SUBJECTIVE: Waking up appropriately.  More interactive.  I Switched him to PSV Last night and he is tolerating very well still.  Nods head no to being SOB , CP or other Pain    ICU Vital Signs Last 24 Hrs  T(C): 37.1 (02 May 2020 23:51), Max: 37.1 (02 May 2020 23:51)  T(F): 98.8 (02 May 2020 23:51), Max: 98.8 (02 May 2020 23:51)  HR: 70 (03 May 2020 00:38) (54 - 78)  BP: --  BP(mean): --  ABP: 172/71 (03 May 2020 00:00) (141/59 - 185/58)  ABP(mean): 103 (03 May 2020 00:00) (84 - 103)  RR: 22 (03 May 2020 00:00) (20 - 28)  SpO2: 97% (03 May 2020 00:38) (85% - 98%)      I&O's Detail    01 May 2020 07:01  -  02 May 2020 07:00  --------------------------------------------------------  IN:    dexmedetomidine Infusion: 415.2 mL    Enteral Tube Flush: 100 mL    Free Water: 400 mL    Glucerna 1.5: 504 mL    heparin  Infusion.: 432 mL    Nepro: 150 mL    Solution: 300 mL    Solution: 50 mL    Solution: 550 mL    Solution: 50 mL  Total IN: 2951.2 mL    OUT:    Indwelling Catheter - Urethral: 1725 mL  Total OUT: 1725 mL    Total NET: 1226.2 mL      02 May 2020 07:01  -  03 May 2020 02:42  --------------------------------------------------------  IN:    dexmedetomidine Infusion: 311.4 mL    Enteral Tube Flush: 100 mL    Free Water: 650 mL    Glucerna 1.5: 432 mL    heparin  Infusion.: 300 mL    Solution: 100 mL    Solution: 300 mL  Total IN: 2193.4 mL    OUT:    Indwelling Catheter - Urethral: 1435 mL  Total OUT: 1435 mL    Total NET: 758.4 mL          Mode: CPAP with PS  FiO2: 40  PEEP: 10  PS: 14  ITime: 0.7  MAP: 12    ABG - ( 02 May 2020 03:59 )  pH, Arterial: 7.46  pH, Blood: x     /  pCO2: 44    /  pO2: 79    / HCO3: 31    / Base Excess: 6.9   /  SaO2: 97                  MEDICATIONS  (STANDING):  ALBUTerol    90 MICROgram(s) HFA Inhaler 6 Puff(s) Inhalation every 6 hours  aMIOdarone    Tablet 200 milliGRAM(s) Oral daily  amLODIPine   Tablet 5 milliGRAM(s) Oral daily  aspirin  chewable 81 milliGRAM(s) Oral daily  cefepime   IVPB 2000 milliGRAM(s) IV Intermittent every 8 hours  chlorhexidine 0.12% Liquid 15 milliLiter(s) Oral Mucosa two times a day  chlorhexidine 2% Cloths 1 Application(s) Topical daily  dexMEDEtomidine Infusion 0.3 MICROgram(s)/kG/Hr (9.42 mL/Hr) IV Continuous <Continuous>  dextrose 5%. 1000 milliLiter(s) (50 mL/Hr) IV Continuous <Continuous>  heparin  Infusion. 2100 Unit(s)/Hr (21 mL/Hr) IV Continuous <Continuous>  insulin glargine Injectable (LANTUS) 50 Unit(s) SubCutaneous every morning  insulin glargine Injectable (LANTUS) 50 Unit(s) SubCutaneous at bedtime  insulin lispro (HumaLOG) corrective regimen sliding scale   SubCutaneous every 4 hours  linezolid  IVPB      linezolid  IVPB 600 milliGRAM(s) IV Intermittent every 12 hours  pantoprazole   Suspension 40 milliGRAM(s) Oral daily  senna Syrup 10 milliLiter(s) Oral daily    MEDICATIONS  (PRN):  acetaminophen   Tablet .. 650 milliGRAM(s) Oral every 6 hours PRN Temp greater or equal to 38C (100.4F), Mild Pain (1 - 3)  heparin   Injectable 99050 Unit(s) IV Push every 6 hours PRN For aPTT less than 40  heparin   Injectable 5000 Unit(s) IV Push every 6 hours PRN For aPTT between 40 - 57  hydrALAZINE Injectable 10 milliGRAM(s) IV Push every 4 hours PRN SBP>180  sodium chloride 0.9% lock flush 10 milliLiter(s) IV Push every 1 hour PRN Pre/post blood products, medications, blood draw, and to maintain line patency      NUTRITION/IVF: Glucerna    PHYSICAL EXAM:     Gen: NAD, On vent. No cyanosis, Pallor.    Eyes: PERRL ~ 3mm, EOMI,     Neurological: GCS 3/1T/6, No focal deficit.     Neck: Supple. NT AT, FROM no pain.  No JVD. No meningeal signs    Pulmonary: NAD, CTA, = BL .      Cardiovascular: RRR, S1, S2, No Murmurs, rubs or gallops noted.    Gastrointestinal: ND, Soft, NT.    Extremities: NT, AT, no edema, erythema or palpable cord noted.  FROM, = 2+ pulses throughout.      LABS:  CBC Full  -  ( 02 May 2020 04:30 )  WBC Count : 5.16 K/uL  RBC Count : 2.97 M/uL  Hemoglobin : 8.9 g/dL  Hematocrit : 29.2 %  Platelet Count - Automated : 267 K/uL  Mean Cell Volume : 98.3 fl  Mean Cell Hemoglobin : 30.0 pg  Mean Cell Hemoglobin Concentration : 30.5 gm/dL  Auto Neutrophil # : 3.52 K/uL  Auto Lymphocyte # : 0.99 K/uL  Auto Monocyte # : 0.35 K/uL  Auto Eosinophil # : 0.02 K/uL  Auto Basophil # : 0.03 K/uL  Auto Neutrophil % : 68.2 %  Auto Lymphocyte % : 19.2 %  Auto Monocyte % : 6.8 %  Auto Eosinophil % : 0.4 %  Auto Basophil % : 0.6 %    05-02    141  |  101  |  26.0<H>  ----------------------------<  191<H>  4.0   |  26.0  |  0.88    Ca    8.4<L>      02 May 2020 04:30  Phos  3.1     05-02  Mg     2.1     05-02    TPro  5.9<L>  /  Alb  2.1<L>  /  TBili  0.3<L>  /  DBili  x   /  AST  22  /  ALT  23  /  AlkPhos  55  05-02    PT/INR - ( 02 May 2020 17:25 )   PT: 15.2 sec;   INR: 1.33 ratio         PTT - ( 02 May 2020 23:05 )  PTT:98.4 sec    RECENT CULTURES:  04-28 .Blood Blood XXXX XXXX   No growth at 48 hours    04-28 .Blood Blood XXXX XXXX   No growth at 48 hours    04-28 .Sputum Sputum TRAP Methicillin resistant Staphylococcus aureus  Escherichia coli  Citrobacter koseri   Few Squamous epithelial cells per low power field  Moderate polymorphonuclear leukocytes per low power field  Moderate Gram positive cocci in pairs per oil power field   Numerous Methicillin resistant Staphylococcus aureus  Moderate Escherichia coli  Moderate Citrobacter koseri  Normal Respiratory Ro absent        LIVER FUNCTIONS - ( 02 May 2020 04:30 )  Alb: 2.1 g/dL / Pro: 5.9 g/dL / ALK PHOS: 55 U/L / ALT: 23 U/L / AST: 22 U/L / GGT: x               CAPILLARY BLOOD GLUCOSE      RADIOLOGY & ADDITIONAL STUDIES:    ASSESSMENT/PLAN:  74yMale presenting with: Acute hypoxic resp failure, DM, HTN, PNA, Fungemia.    Neurological: Maintain current SOL 0to -1 on Precedex.      Pulmonary: Wean PS Today as tolerated to 8/8.  If pt can not progress towards extubation by Tuesday, family states they will consent to Trach.    Cardiovascular: Mainytining MAPs >65 on own.    Gastrointestinal: CW TF At goal unless pt meets criteria for extubation.     Genitourinary: Monitor Cr and U/O    Heme: CW Hep drip for COVID elevated risk of thrombosis.    ID: CW Course of Abx    Lines/ Tubes: Maintain all invasive lines and tubes    Dispo: Critical care as above        CRITICAL CARE TIME SPENT: Critical care time spent: 60 minutes of critical care time spent providing medical care for patient's acute illness/conditions that impairs at least one vital organ system and/or poses a high risk of imminent or life threatening deterioration in the patient's condition. It includes time spent evaluating and treating the patient's acute illness as well as time spent reviewing labs, radiology, discussing goals of care with patient and/or patient's family, and discussing the case with a multidisciplinary team in an effort to prevent further life threatening deterioration or end organ damage. This time is independent of any procedures performed.

## 2020-05-04 NOTE — CHART NOTE - NSCHARTNOTEFT_GEN_A_CORE
Source: Patient [ ]  Family [ ]   other [ x] EMR     Current Diet: Diet, NPO with Tube Feed:   Tube Feeding Modality: Orogastric  Glucerna 1.5 Jatinder  Total Volume for 24 Hours (mL): 1248  Continuous     Every 4 hours  Until Goal Tube Feed Rate (mL per Hour): 52  Tube Feed Duration (in Hours): 24  Tube Feed Start Time: 05:45  No Carb Prosource (1pkg = 15gms Protein)     Qty per Day:  1 (05-01-20 @ 10:47)    Current Weight:   (5/4) 308 lbs   (4/28) 304.8 lbs  (4/25) 310.4 lbs  (4/24) 299.1 lbs  (4/23) 305.1 lbs  (4/22) 308.6 lbs  (4/21) 301.3 lbs  (4/18) 301.1 lbs  (4/13) 290.3 lbs  (4/12) 294.9 lbs  (4/11) 300.9 lbs  (4/10) 303.3 lbs  (4/9) 292.4 lbs  (4/7) 272.4 lbs  (4/4) 276.9 lbs  (4/3) 276.9 lbs    % Weight Change: Unsure of accuracy of wt's secondary to inconsistency, will continue to monitor wt's for trends (Generalized 2 + edema noted, 3+ edema to B/L hands noted)     Pertinent Medications: MEDICATIONS  (STANDING):  ALBUTerol    90 MICROgram(s) HFA Inhaler 6 Puff(s) Inhalation every 6 hours  aMIOdarone    Tablet 200 milliGRAM(s) Oral daily  amLODIPine   Tablet 5 milliGRAM(s) Oral daily  aspirin  chewable 81 milliGRAM(s) Oral daily  cefepime   IVPB 2000 milliGRAM(s) IV Intermittent every 8 hours  chlorhexidine 0.12% Liquid 15 milliLiter(s) Oral Mucosa two times a day  chlorhexidine 2% Cloths 1 Application(s) Topical daily  dexMEDEtomidine Infusion 0.3 MICROgram(s)/kG/Hr (9.42 mL/Hr) IV Continuous <Continuous>  dextrose 5%. 1000 milliLiter(s) (50 mL/Hr) IV Continuous <Continuous>  heparin  Infusion. 2100 Unit(s)/Hr (21 mL/Hr) IV Continuous <Continuous>  hydrocortisone sodium succinate Injectable 50 milliGRAM(s) IV Push every 12 hours  insulin glargine Injectable (LANTUS) 50 Unit(s) SubCutaneous every morning  insulin glargine Injectable (LANTUS) 50 Unit(s) SubCutaneous at bedtime  insulin lispro (HumaLOG) corrective regimen sliding scale   SubCutaneous every 4 hours  linezolid  IVPB      linezolid  IVPB 600 milliGRAM(s) IV Intermittent every 12 hours  pantoprazole   Suspension 40 milliGRAM(s) Oral daily  senna Syrup 10 milliLiter(s) Oral daily    MEDICATIONS  (PRN):  acetaminophen   Tablet .. 650 milliGRAM(s) Oral every 6 hours PRN Temp greater or equal to 38C (100.4F), Mild Pain (1 - 3)  heparin   Injectable 00191 Unit(s) IV Push every 6 hours PRN For aPTT less than 40  heparin   Injectable 5000 Unit(s) IV Push every 6 hours PRN For aPTT between 40 - 57  hydrALAZINE Injectable 10 milliGRAM(s) IV Push every 4 hours PRN SBP>180  sodium chloride 0.9% lock flush 10 milliLiter(s) IV Push every 1 hour PRN Pre/post blood products, medications, blood draw, and to maintain line patency    Pertinent Labs: CBC Full  -  ( 04 May 2020 05:01 )  WBC Count : 6.52 K/uL  RBC Count : 2.95 M/uL  Hemoglobin : 8.7 g/dL  Hematocrit : 28.9 %  Platelet Count - Automated : 237 K/uL  Mean Cell Volume : 98.0 fl  Mean Cell Hemoglobin : 29.5 pg  Mean Cell Hemoglobin Concentration : 30.1 gm/dL  Auto Neutrophil # : 5.11 K/uL  Auto Lymphocyte # : 0.84 K/uL  Auto Monocyte # : 0.34 K/uL  Auto Eosinophil # : 0.00 K/uL  Auto Basophil # : 0.02 K/uL  Auto Neutrophil % : 78.4 %  Auto Lymphocyte % : 12.9 %  Auto Monocyte % : 5.2 %  Auto Eosinophil % : 0.0 %  Auto Basophil % : 0.3 %        Skin: Moisture associated dermatitis per documentation       Estimated Needs:   [x ] no change since previous assessment  [ ] recalculated:     Current Nutrition Diagnosis:  Pt remains at high nutrition risk secondary to malnutrition (moderate, acute) related to inability to meet sufficient energy requirements in setting of COVID + with hypoxic respiratory failure, Hospital course complicated by superimposed bacterial PNA and most recently, fungemia, remains intubated on vent support as evidenced by meeting <75% nutrient needs >7 days and +fluid accumulation. Pt remains intubated/sedated, receiving Glucerna 1.5 @ 52ml/hr (x20 hours) 1040ml/day; 1560kcal, 87gm protein, +prostat daily 100kcal, 15gm protein; meeting low end of estimated nutrition needs at this time, K+ and Phos now WNL. Aware possible extubation, and if unable to extubate possible need for Trach.  Recommendations below:     Recommendations:   1. Increase Glucerna 1.5 to goal rate of 60 ml/hr (x20 hrs) to provide 1200 ml, 1800 kcal, 99g protein, 759 ml free water, and >100% of RDIs for vitamins/minerals. Continue Prostat 30 ml once daily for an additional 100 kcal and 15g protein. Additional free water per MD discretion.  2) Rx: MVI and vit C 500mg daily.  3) Monitor tube feed tolerance.  4) Obtain daily weights as feasible to monitor trends.       Monitoring and Evaluation:   [ ] PO intake [x ] Tolerance to diet prescription [X] Weights  [X] Follow up per protocol [X] Labs:

## 2020-05-04 NOTE — PROGRESS NOTE ADULT - SUBJECTIVE AND OBJECTIVE BOX
INTERVAL HPI/OVERNIGHT EVENTS/SUBJECTIVE: Tolerating PS 10/10 well.  Started Steroids yesterday in prep for possible extubation. Last night weaned him to 8/8 on PSV.  Tolerating well.      ICU Vital Signs Last 24 Hrs  T(C): 36.8 (04 May 2020 00:07), Max: 37.2 (03 May 2020 04:47)  T(F): 98.2 (04 May 2020 00:07), Max: 99 (03 May 2020 04:47)  HR: 63 (04 May 2020 00:30) (61 - 79)  BP: --  BP(mean): --  ABP: 178/72 (04 May 2020 00:30) (162/64 - 188/75)  ABP(mean): 99 (04 May 2020 00:30) (89 - 106)  RR: 20 (04 May 2020 00:30) (20 - 27)  SpO2: 95% (04 May 2020 00:30) (95% - 98%)      I&O's Detail    02 May 2020 07:01  -  03 May 2020 07:00  --------------------------------------------------------  IN:    dexmedetomidine Infusion: 415.2 mL    Enteral Tube Flush: 150 mL    Free Water: 850 mL    Glucerna 1.5: 576 mL    heparin  Infusion.: 390 mL    Solution: 600 mL    Solution: 150 mL  Total IN: 3131.2 mL    OUT:    Indwelling Catheter - Urethral: 1785 mL  Total OUT: 1785 mL    Total NET: 1346.2 mL      03 May 2020 07:01  -  04 May 2020 02:40  --------------------------------------------------------  IN:    dexmedetomidine Infusion: 265.5 mL    Enteral Tube Flush: 400 mL    Glucerna 1.5: 264 mL    heparin  Infusion.: 225 mL    Solution: 50 mL    Solution: 300 mL  Total IN: 1504.5 mL    OUT:    Indwelling Catheter - Urethral: 1415 mL  Total OUT: 1415 mL    Total NET: 89.5 mL          Mode: CPAP with PS  FiO2: 40  PEEP: 8  PS: 8  MAP: 15    ABG - ( 03 May 2020 14:28 )  pH, Arterial: 7.48  pH, Blood: x     /  pCO2: 42    /  pO2: 95    / HCO3: 31    / Base Excess: 7.1   /  SaO2: 99                  MEDICATIONS  (STANDING):  ALBUTerol    90 MICROgram(s) HFA Inhaler 6 Puff(s) Inhalation every 6 hours  aMIOdarone    Tablet 200 milliGRAM(s) Oral daily  amLODIPine   Tablet 5 milliGRAM(s) Oral daily  aspirin  chewable 81 milliGRAM(s) Oral daily  cefepime   IVPB 2000 milliGRAM(s) IV Intermittent every 8 hours  chlorhexidine 0.12% Liquid 15 milliLiter(s) Oral Mucosa two times a day  chlorhexidine 2% Cloths 1 Application(s) Topical daily  dexMEDEtomidine Infusion 0.3 MICROgram(s)/kG/Hr (9.42 mL/Hr) IV Continuous <Continuous>  dextrose 5%. 1000 milliLiter(s) (50 mL/Hr) IV Continuous <Continuous>  heparin  Infusion. 2100 Unit(s)/Hr (21 mL/Hr) IV Continuous <Continuous>  hydrocortisone sodium succinate Injectable 50 milliGRAM(s) IV Push every 12 hours  insulin glargine Injectable (LANTUS) 50 Unit(s) SubCutaneous every morning  insulin glargine Injectable (LANTUS) 50 Unit(s) SubCutaneous at bedtime  insulin lispro (HumaLOG) corrective regimen sliding scale   SubCutaneous every 4 hours  linezolid  IVPB      linezolid  IVPB 600 milliGRAM(s) IV Intermittent every 12 hours  pantoprazole   Suspension 40 milliGRAM(s) Oral daily  senna Syrup 10 milliLiter(s) Oral daily    MEDICATIONS  (PRN):  acetaminophen   Tablet .. 650 milliGRAM(s) Oral every 6 hours PRN Temp greater or equal to 38C (100.4F), Mild Pain (1 - 3)  heparin   Injectable 47713 Unit(s) IV Push every 6 hours PRN For aPTT less than 40  heparin   Injectable 5000 Unit(s) IV Push every 6 hours PRN For aPTT between 40 - 57  hydrALAZINE Injectable 10 milliGRAM(s) IV Push every 4 hours PRN SBP>180  sodium chloride 0.9% lock flush 10 milliLiter(s) IV Push every 1 hour PRN Pre/post blood products, medications, blood draw, and to maintain line patency      NUTRITION/IVF: Glucerna @52    PHYSICAL EXAM:     Gen: NAD, Obese, No cyanosis, Pallor.    Eyes: PERRL ~ 3mm, EOMI,     Neurological: GCS 2/1T/6, No focal deficit. RASS -2     Pulmonary: NAD, CTA, = BL .      Cardiovascular: RRR, S1, S2, No Murmurs, rubs or gallops noted.    Gastrointestinal: ND, Soft, NT.    Extremities: NT, AT, no edema, erythema or palpable cord noted.  FROM, = 2+ pulses throughout.    LABS:  CBC Full  -  ( 03 May 2020 04:49 )  WBC Count : 6.92 K/uL  RBC Count : 2.96 M/uL  Hemoglobin : 8.7 g/dL  Hematocrit : 28.8 %  Platelet Count - Automated : 249 K/uL  Mean Cell Volume : 97.3 fl  Mean Cell Hemoglobin : 29.4 pg  Mean Cell Hemoglobin Concentration : 30.2 gm/dL  Auto Neutrophil # : 5.25 K/uL  Auto Lymphocyte # : 0.94 K/uL  Auto Monocyte # : 0.48 K/uL  Auto Eosinophil # : 0.01 K/uL  Auto Basophil # : 0.03 K/uL  Auto Neutrophil % : 76.0 %  Auto Lymphocyte % : 13.6 %  Auto Monocyte % : 6.9 %  Auto Eosinophil % : 0.1 %  Auto Basophil % : 0.4 %    05-03    141  |  102  |  22.0<H>  ----------------------------<  104<H>  4.3   |  28.0  |  0.80    Ca    8.0<L>      03 May 2020 04:49  Phos  3.3     05-03  Mg     1.9     05-03    TPro  5.9<L>  /  Alb  2.1<L>  /  TBili  0.3<L>  /  DBili  x   /  AST  22  /  ALT  23  /  AlkPhos  55  05-02    PT/INR - ( 02 May 2020 17:25 )   PT: 15.2 sec;   INR: 1.33 ratio         PTT - ( 03 May 2020 05:37 )  PTT:83.5 sec    RECENT CULTURES:  04-28 .Blood Blood XXXX XXXX   No growth at 5 days.    04-28 .Blood Blood XXXX XXXX   No growth at 5 days.    04-28 .Sputum Sputum TRAP Methicillin resistant Staphylococcus aureus  Escherichia coli  Citrobacter koseri   Few Squamous epithelial cells per low power field  Moderate polymorphonuclear leukocytes per low power field  Moderate Gram positive cocci in pairs per oil power field   Numerous Methicillin resistant Staphylococcus aureus  Moderate Escherichia coli  Moderate Citrobacter koseri  Normal Respiratory Ro absent        LIVER FUNCTIONS - ( 02 May 2020 04:30 )  Alb: 2.1 g/dL / Pro: 5.9 g/dL / ALK PHOS: 55 U/L / ALT: 23 U/L / AST: 22 U/L / GGT: x               CAPILLARY BLOOD GLUCOSE      RADIOLOGY & ADDITIONAL STUDIES:    ASSESSMENT/PLAN:  74yMale presenting with: Acute hypoxic resp failure COVID-19, Chronic DM, hyperglycemia, Chronic HTN, PNA Ecoli, Citerobacter, MRSA, Fungemia. Hypernatremia.    Neurological: Fair RASS at this time.  Would continue to wean Precedex to have somewhat improved RASS.    Pulmonary: Tolerating 8/8.  Would check RSBI, Cuff leak , NIF.  Already prepped with Steroids for extubation.  If RSBI <90, PF> 200 and good Mental status, would extubate.  If fails or can not be safely extubated over next 1-2 days would highly consider Trach.    Cardiovascular: CW Amio, Norvasc and Keep MAPs >65.    Gastrointestinal: CW Glucerna.  If shows readiness for liberation from vent then would place NGT to suction.    Genitourinary: FU Cr and U/O.  Good at this time.    Heme: CW Hep drip for COVID hypercoaguable state.    ID: Abx for PNA.  Iso for Covid.    Endo: Steroids 24 hr prior and 48 hr post extubation.     Skin: Frequent off loading.     Lines/ Tubes: Keep all invasive lines and tubes unless meets criteria for liberation.     Dispo: CW Critical care as above.      CRITICAL CARE TIME SPENT: Critical care time spent: 60 minutes of critical care time spent providing medical care for patient's acute illness/conditions that impairs at least one vital organ system and/or poses a high risk of imminent or life threatening deterioration in the patient's condition. It includes time spent evaluating and treating the patient's acute illness as well as time spent reviewing labs, radiology, discussing goals of care with patient and/or patient's family, and discussing the case with a multidisciplinary team in an effort to prevent further life threatening deterioration or end organ damage. This time is independent of any procedures performed.

## 2020-05-04 NOTE — PROGRESS NOTE ADULT - ATTENDING COMMENTS
The patient was seen and examined  Events noted  No new problems    Neurologic:  GCS=11T  Respiratory:  PSV 40%, PEEP+8; GGQ=822  Hemodynamic:  Normal  Renal:  Urine flow okay  GI:  TEN  Hematologic:  Hgb okay  ID:  No new issues    Plan:  Obtain screening blood culture--received a 2 week course of fluconazole for candidemia  Will reduce PS  Nutritional support  Anticoagulation

## 2020-05-05 NOTE — PROCEDURE NOTE - NSPROCNAME_GEN_A_CORE
Arterial Puncture/Cannulation
Central Line Insertion
Central Line Insertion
Chest Tube
Arterial Puncture/Cannulation

## 2020-05-05 NOTE — PROCEDURE NOTE - NSICDXPROCEDURE_GEN_ALL_CORE_FT
PROCEDURES:  Percutaneous insertion of arterial line 04-Apr-2020 04:19:46  Herman Phelps
none
PROCEDURES:  Chest tube insertion 05-May-2020 19:47:00  Herman Phelps  Percutaneous insertion of arterial line 04-Apr-2020 04:19:46  Herman Phelps

## 2020-05-05 NOTE — PROGRESS NOTE ADULT - SUBJECTIVE AND OBJECTIVE BOX
24h Events:    ICU Vital Signs Last 24 Hrs  T(C): 36.3 (05 May 2020 00:01), Max: 36.8 (04 May 2020 20:02)  T(F): 97.3 (05 May 2020 00:01), Max: 98.2 (04 May 2020 20:02)  HR: 84 (05 May 2020 02:45) (55 - 88)  BP: 183/81 (04 May 2020 20:40) (183/81 - 183/81)  BP(mean): 117 (04 May 2020 20:40) (117 - 117)  ABP: 159/60 (05 May 2020 00:01) (155/64 - 212/70)  ABP(mean): 87 (05 May 2020 00:01) (83 - 109)  RR: 24 (05 May 2020 00:01) (21 - 27)  SpO2: 95% (05 May 2020 02:45) (91% - 99%)      I&O's Detail    03 May 2020 07:01  -  04 May 2020 07:00  --------------------------------------------------------  IN:    dexmedetomidine Infusion: 369 mL    Enteral Tube Flush: 400 mL    Glucerna 1.5: 288 mL    heparin  Infusion.: 345 mL    Solution: 50 mL    Solution: 300 mL  Total IN: 1752 mL    OUT:    Indwelling Catheter - Urethral: 1765 mL  Total OUT: 1765 mL    Total NET: -13 mL      04 May 2020 07:01  -  05 May 2020 03:27  --------------------------------------------------------  IN:    dexmedetomidine Infusion: 268.2 mL    Free Water: 600 mL    Glucerna 1.5: 936 mL    heparin  Infusion.: 342 mL    Solution: 50 mL    Solution: 300 mL  Total IN: 2496.2 mL    OUT:    Indwelling Catheter - Urethral: 1065 mL  Total OUT: 1065 mL    Total NET: 1431.2 mL          ABG - ( 04 May 2020 03:46 )  pH, Arterial: 7.50  pH, Blood: x     /  pCO2: 40    /  pO2: 92    / HCO3: 31    / Base Excess: 7.4   /  SaO2: 98                  MEDICATIONS  (STANDING):  ALBUTerol    90 MICROgram(s) HFA Inhaler 6 Puff(s) Inhalation every 6 hours  aMIOdarone    Tablet 200 milliGRAM(s) Oral daily  amLODIPine   Tablet 5 milliGRAM(s) Oral daily  aspirin  chewable 81 milliGRAM(s) Oral daily  cefepime   IVPB 2000 milliGRAM(s) IV Intermittent every 8 hours  chlorhexidine 0.12% Liquid 15 milliLiter(s) Oral Mucosa two times a day  chlorhexidine 2% Cloths 1 Application(s) Topical daily  dexMEDEtomidine Infusion 0.3 MICROgram(s)/kG/Hr (9.42 mL/Hr) IV Continuous <Continuous>  dextrose 5%. 1000 milliLiter(s) (50 mL/Hr) IV Continuous <Continuous>  heparin  Infusion. 2100 Unit(s)/Hr (21 mL/Hr) IV Continuous <Continuous>  hydrocortisone sodium succinate Injectable 50 milliGRAM(s) IV Push every 12 hours  insulin glargine Injectable (LANTUS) 50 Unit(s) SubCutaneous every morning  insulin glargine Injectable (LANTUS) 50 Unit(s) SubCutaneous at bedtime  insulin lispro (HumaLOG) corrective regimen sliding scale   SubCutaneous every 4 hours  linezolid  IVPB      linezolid  IVPB 600 milliGRAM(s) IV Intermittent every 12 hours  pantoprazole   Suspension 40 milliGRAM(s) Oral daily  senna Syrup 10 milliLiter(s) Oral daily    MEDICATIONS  (PRN):  acetaminophen   Tablet .. 650 milliGRAM(s) Oral every 6 hours PRN Temp greater or equal to 38C (100.4F), Mild Pain (1 - 3)  heparin   Injectable 12818 Unit(s) IV Push every 6 hours PRN For aPTT less than 40  heparin   Injectable 5000 Unit(s) IV Push every 6 hours PRN For aPTT between 40 - 57  hydrALAZINE Injectable 10 milliGRAM(s) IV Push every 4 hours PRN SBP>180  sodium chloride 0.9% lock flush 10 milliLiter(s) IV Push every 1 hour PRN Pre/post blood products, medications, blood draw, and to maintain line patency        Physical Exam:    Neurological: sedated    Pulmonary: unlabored, trachea midline    Cardiovascular: nsr    Gastrointestinal: soft, non-tender, non-distended, no rebound / guarding    : brown in place     Skin: warm, dry, no diaphoresis, no pallor, cyanosis, or jaundice    LABS:  CBC Full  -  ( 04 May 2020 05:01 )  WBC Count : 6.52 K/uL  RBC Count : 2.95 M/uL  Hemoglobin : 8.7 g/dL  Hematocrit : 28.9 %  Platelet Count - Automated : 237 K/uL  Mean Cell Volume : 98.0 fl  Mean Cell Hemoglobin : 29.5 pg  Mean Cell Hemoglobin Concentration : 30.1 gm/dL  Auto Neutrophil # : 5.11 K/uL  Auto Lymphocyte # : 0.84 K/uL  Auto Monocyte # : 0.34 K/uL  Auto Eosinophil # : 0.00 K/uL  Auto Basophil # : 0.02 K/uL  Auto Neutrophil % : 78.4 %  Auto Lymphocyte % : 12.9 %  Auto Monocyte % : 5.2 %  Auto Eosinophil % : 0.0 %  Auto Basophil % : 0.3 %    05-04    137  |  99  |  21.0<H>  ----------------------------<  141<H>  4.4   |  26.0  |  0.77    Ca    8.0<L>      04 May 2020 05:01  Phos  3.3     05-04  Mg     2.0     05-04    TPro  5.8<L>  /  Alb  2.1<L>  /  TBili  0.3<L>  /  DBili  x   /  AST  23  /  ALT  21  /  AlkPhos  52  05-04    PTT - ( 04 May 2020 05:01 )  PTT:83.7 sec    RECENT CULTURES:  04-28 .Blood Blood XXXX XXXX   No growth at 5 days.    04-28 .Blood Blood XXXX XXXX   No growth at 5 days.    04-28 .Sputum Sputum TRAP Methicillin resistant Staphylococcus aureus  Escherichia coli  Citrobacter koseri   Few Squamous epithelial cells per low power field  Moderate polymorphonuclear leukocytes per low power field  Moderate Gram positive cocci in pairs per oil power field   Numerous Methicillin resistant Staphylococcus aureus  Moderate Escherichia coli  Moderate Citrobacter koseri  Normal Respiratory Ro absent        LIVER FUNCTIONS - ( 04 May 2020 05:01 )  Alb: 2.1 g/dL / Pro: 5.8 g/dL / ALK PHOS: 52 U/L / ALT: 21 U/L / AST: 23 U/L / GGT: x                   ASSESSMENT/PLAN:  74y Male covid +, hypoxic resp failure, ARDS    Neuro: analgosedation while intubated, delirium precautions, daily sedation holiday    CV: continue invasive monitoring with arterial line    Pulm: ARDS-    GI/Nutrition: NPO with tube feeds - hold feeds if prone    /Renal: brown for strict I&O, monitor kidney fxn    ID: COVID + supportive care     Endo: monitor blood glucose    Skin: repositioning for DTI prevention while in bed    Heme/DVT Prophylaxis: SCDs / heparin gtt    Dispo: ICU 24h Events: Trial of PSV 5 yesterday, however patient did not tolerate, tachypneic and tachycardic.  Transitioned back to 8/8.  Will continue to reassure patient and try again today.   Vital signs stable.  Electrolytes wnl, UOP adequate.  Remains afebrile without leukocytosis.      ICU Vital Signs Last 24 Hrs  T(C): 36.3 (05 May 2020 00:01), Max: 36.8 (04 May 2020 20:02)  T(F): 97.3 (05 May 2020 00:01), Max: 98.2 (04 May 2020 20:02)  HR: 84 (05 May 2020 02:45) (55 - 88)  BP: 183/81 (04 May 2020 20:40) (183/81 - 183/81)  BP(mean): 117 (04 May 2020 20:40) (117 - 117)  ABP: 159/60 (05 May 2020 00:01) (155/64 - 212/70)  ABP(mean): 87 (05 May 2020 00:01) (83 - 109)  RR: 24 (05 May 2020 00:01) (21 - 27)  SpO2: 95% (05 May 2020 02:45) (91% - 99%)      I&O's Detail    03 May 2020 07:01  -  04 May 2020 07:00  --------------------------------------------------------  IN:    dexmedetomidine Infusion: 369 mL    Enteral Tube Flush: 400 mL    Glucerna 1.5: 288 mL    heparin  Infusion.: 345 mL    Solution: 50 mL    Solution: 300 mL  Total IN: 1752 mL    OUT:    Indwelling Catheter - Urethral: 1765 mL  Total OUT: 1765 mL    Total NET: -13 mL      04 May 2020 07:01  -  05 May 2020 03:27  --------------------------------------------------------  IN:    dexmedetomidine Infusion: 268.2 mL    Free Water: 600 mL    Glucerna 1.5: 936 mL    heparin  Infusion.: 342 mL    Solution: 50 mL    Solution: 300 mL  Total IN: 2496.2 mL    OUT:    Indwelling Catheter - Urethral: 1065 mL  Total OUT: 1065 mL    Total NET: 1431.2 mL          ABG - ( 04 May 2020 03:46 )  pH, Arterial: 7.50  pH, Blood: x     /  pCO2: 40    /  pO2: 92    / HCO3: 31    / Base Excess: 7.4   /  SaO2: 98                  MEDICATIONS  (STANDING):  ALBUTerol    90 MICROgram(s) HFA Inhaler 6 Puff(s) Inhalation every 6 hours  aMIOdarone    Tablet 200 milliGRAM(s) Oral daily  amLODIPine   Tablet 5 milliGRAM(s) Oral daily  aspirin  chewable 81 milliGRAM(s) Oral daily  cefepime   IVPB 2000 milliGRAM(s) IV Intermittent every 8 hours  chlorhexidine 0.12% Liquid 15 milliLiter(s) Oral Mucosa two times a day  chlorhexidine 2% Cloths 1 Application(s) Topical daily  dexMEDEtomidine Infusion 0.3 MICROgram(s)/kG/Hr (9.42 mL/Hr) IV Continuous <Continuous>  dextrose 5%. 1000 milliLiter(s) (50 mL/Hr) IV Continuous <Continuous>  heparin  Infusion. 2100 Unit(s)/Hr (21 mL/Hr) IV Continuous <Continuous>  hydrocortisone sodium succinate Injectable 50 milliGRAM(s) IV Push every 12 hours  insulin glargine Injectable (LANTUS) 50 Unit(s) SubCutaneous every morning  insulin glargine Injectable (LANTUS) 50 Unit(s) SubCutaneous at bedtime  insulin lispro (HumaLOG) corrective regimen sliding scale   SubCutaneous every 4 hours  linezolid  IVPB      linezolid  IVPB 600 milliGRAM(s) IV Intermittent every 12 hours  pantoprazole   Suspension 40 milliGRAM(s) Oral daily  senna Syrup 10 milliLiter(s) Oral daily    MEDICATIONS  (PRN):  acetaminophen   Tablet .. 650 milliGRAM(s) Oral every 6 hours PRN Temp greater or equal to 38C (100.4F), Mild Pain (1 - 3)  heparin   Injectable 73986 Unit(s) IV Push every 6 hours PRN For aPTT less than 40  heparin   Injectable 5000 Unit(s) IV Push every 6 hours PRN For aPTT between 40 - 57  hydrALAZINE Injectable 10 milliGRAM(s) IV Push every 4 hours PRN SBP>180  sodium chloride 0.9% lock flush 10 milliLiter(s) IV Push every 1 hour PRN Pre/post blood products, medications, blood draw, and to maintain line patency        Physical Exam:    Neurological: sedated    Pulmonary: unlabored, trachea midline    Cardiovascular: nsr    Gastrointestinal: soft, non-tender, non-distended, no rebound / guarding    : brown in place     Skin: warm, dry, no diaphoresis, no pallor, cyanosis, or jaundice    LABS:  CBC Full  -  ( 04 May 2020 05:01 )  WBC Count : 6.52 K/uL  RBC Count : 2.95 M/uL  Hemoglobin : 8.7 g/dL  Hematocrit : 28.9 %  Platelet Count - Automated : 237 K/uL  Mean Cell Volume : 98.0 fl  Mean Cell Hemoglobin : 29.5 pg  Mean Cell Hemoglobin Concentration : 30.1 gm/dL  Auto Neutrophil # : 5.11 K/uL  Auto Lymphocyte # : 0.84 K/uL  Auto Monocyte # : 0.34 K/uL  Auto Eosinophil # : 0.00 K/uL  Auto Basophil # : 0.02 K/uL  Auto Neutrophil % : 78.4 %  Auto Lymphocyte % : 12.9 %  Auto Monocyte % : 5.2 %  Auto Eosinophil % : 0.0 %  Auto Basophil % : 0.3 %    05-04    137  |  99  |  21.0<H>  ----------------------------<  141<H>  4.4   |  26.0  |  0.77    Ca    8.0<L>      04 May 2020 05:01  Phos  3.3     05-04  Mg     2.0     05-04    TPro  5.8<L>  /  Alb  2.1<L>  /  TBili  0.3<L>  /  DBili  x   /  AST  23  /  ALT  21  /  AlkPhos  52  05-04    PTT - ( 04 May 2020 05:01 )  PTT:83.7 sec    RECENT CULTURES:  04-28 .Blood Blood XXXX XXXX   No growth at 5 days.    04-28 .Blood Blood XXXX XXXX   No growth at 5 days.    04-28 .Sputum Sputum TRAP Methicillin resistant Staphylococcus aureus  Escherichia coli  Citrobacter koseri   Few Squamous epithelial cells per low power field  Moderate polymorphonuclear leukocytes per low power field  Moderate Gram positive cocci in pairs per oil power field   Numerous Methicillin resistant Staphylococcus aureus  Moderate Escherichia coli  Moderate Citrobacter koseri  Normal Respiratory Ro absent        LIVER FUNCTIONS - ( 04 May 2020 05:01 )  Alb: 2.1 g/dL / Pro: 5.8 g/dL / ALK PHOS: 52 U/L / ALT: 21 U/L / AST: 23 U/L / GGT: x

## 2020-05-05 NOTE — PROCEDURE NOTE - NSPOSTPRCRAD_GEN_A_CORE
no pneumothorax/depth of insertion/central line located in the/post-procedure radiography performed
chest tube in correct position

## 2020-05-05 NOTE — PROCEDURE NOTE - NSSITEPREP_SKIN_A_CORE
chlorhexidine
chlorhexidine/Adherence to aseptic technique: hand hygiene prior to donning barriers (gown, gloves), don cap and mask, sterile drape over patient
chlorhexidine
chlorhexidine/Adherence to aseptic technique: hand hygiene prior to donning barriers (gown, gloves), don cap and mask, sterile drape over patient
chlorhexidine/Adherence to aseptic technique: hand hygiene prior to donning barriers (gown, gloves), don cap and mask, sterile drape over patient

## 2020-05-05 NOTE — PROCEDURE NOTE - NSPROCDETAILS_GEN_ALL_CORE
sterile dressing applied/ultrasound guidance/lumen(s) aspirated and flushed/guidewire recovered
location identified, draped/prepped, sterile technique used, needle inserted/introduced/connected to a pressurized flush line/Seldinger technique/ultrasound guidance/positive blood return obtained via catheter/sutured in place/all materials/supplies accounted for at end of procedure/hemostasis with direct pressure, dressing applied
sterile technique, catheter placed/lumen(s) aspirated and flushed/guidewire recovered/sterile dressing applied
dressing applied/sterile dressing applied
Seldinger technique/all materials/supplies accounted for at end of procedure/location identified, draped/prepped, sterile technique used, needle inserted/introduced/sutured in place/connected to a pressurized flush line/hemostasis with direct pressure, dressing applied/positive blood return obtained via catheter

## 2020-05-05 NOTE — CHART NOTE - NSCHARTNOTEFT_GEN_A_CORE
Patient was seen and examined during AM rounds  Vent alarming due to high Pressures (50-60)  Trouble shooting included aggressive lavage and suctioning changing of the ventilator, MDI treatment, increase in sedation and paralytic with no improvement  on Exam, patient became to desaturate to 70% on 100% fio2 and high PEEP  patient noted to have decrease breath sounds to left chest, while awaiting for XRAY, emergent chest tube place prophylactically, no air noted, however >1l serosanguinous fluid drained  chest xray showed complete opacification of left chest  anesthesia was called and tube exchanged to a size 8  tube noted to be completely clogged   repeat chest xray showed expansion left lung  patient now comfortable on AC volume control  family updated on status.

## 2020-05-05 NOTE — PROCEDURE NOTE - NSINDICATIONS_GEN_A_CORE
critical illness/emergency venous access
critical patient/arterial puncture to obtain ABG's
critical illness
pleural effusion
critical patient/monitoring purposes/arterial puncture to obtain ABG's

## 2020-05-06 NOTE — PROGRESS NOTE ADULT - ATTENDING COMMENTS
The patient was seen and examined  Events noted  No new problems    Neurologic:  Sedated with precedex, RASS=-3  Respiratory:  VC 60%, PEEP=12; DRR=984  Hemodynamic:  Normal  Renal:  Urine flow okay  GI:  TEN  Hematologic:  Hgb 6.9  ID:  Antibiotics stop today    Plan:  Will transfuse 1 unit PRBC  Will discuss tracheostomy with family  Nutritional support  Anticoagulation

## 2020-05-06 NOTE — PROGRESS NOTE ADULT - SUBJECTIVE AND OBJECTIVE BOX
24h Events: Events noted previously. Patient with small air leak of CT, no ptx on cxr.  Patient p/f 100 however plateaus 36.  Patient also with MAPs 60, lactate of 3 overnight, large amount of serosanginous fluid from CT- given 1L IVF with response in pressors.  UOP improved.  Requiring frequent suctioning, abx on board, cultures pending.     ICU Vital Signs Last 24 Hrs  T(C): 35.2 (06 May 2020 00:00), Max: 37.7 (05 May 2020 16:22)  T(F): 95.5 (06 May 2020 00:00), Max: 99.9 (05 May 2020 16:22)  HR: 62 (06 May 2020 00:00) (62 - 96)  BP: --  BP(mean): --  ABP: 104/49 (06 May 2020 00:00) (104/49 - 165/65)  ABP(mean): 64 (06 May 2020 00:00) (64 - 96)  RR: 27 (06 May 2020 00:00) (24 - 31)  SpO2: 98% (06 May 2020 00:00) (80% - 100%)      I&O's Detail    04 May 2020 07:01  -  05 May 2020 07:00  --------------------------------------------------------  IN:    dexmedetomidine Infusion: 381 mL    Free Water: 800 mL    Glucerna 1.5: 1248 mL    heparin  Infusion.: 447 mL    Solution: 150 mL    Solution: 600 mL  Total IN: 3626 mL    OUT:    Indwelling Catheter - Urethral: 1215 mL  Total OUT: 1215 mL    Total NET: 2411 mL      05 May 2020 07:01  -  06 May 2020 02:48  --------------------------------------------------------  IN:    dexmedetomidine Infusion: 254.2 mL    Enteral Tube Flush: 60 mL    Free Water: 200 mL    Glucerna 1.5: 312 mL    heparin  Infusion.: 226 mL    multiple electrolytes Injection Type 1 Bolus: 1000 mL    Solution: 450 mL  Total IN: 2502.2 mL    OUT:    Indwelling Catheter - Urethral: 810 mL  Total OUT: 810 mL    Total NET: 1692.2 mL          ABG - ( 05 May 2020 21:37 )  pH, Arterial: 7.44  pH, Blood: x     /  pCO2: 41    /  pO2: 69    / HCO3: 27    / Base Excess: 3.3   /  SaO2: 96                  MEDICATIONS  (STANDING):  ALBUTerol    90 MICROgram(s) HFA Inhaler 6 Puff(s) Inhalation every 6 hours  aMIOdarone    Tablet 200 milliGRAM(s) Oral daily  amLODIPine   Tablet 5 milliGRAM(s) Oral daily  aspirin  chewable 81 milliGRAM(s) Oral daily  cefepime   IVPB 2000 milliGRAM(s) IV Intermittent every 8 hours  chlorhexidine 0.12% Liquid 15 milliLiter(s) Oral Mucosa two times a day  chlorhexidine 2% Cloths 1 Application(s) Topical daily  dexMEDEtomidine Infusion 0.3 MICROgram(s)/kG/Hr (9.42 mL/Hr) IV Continuous <Continuous>  dextrose 5%. 1000 milliLiter(s) (50 mL/Hr) IV Continuous <Continuous>  heparin  Infusion. 2100 Unit(s)/Hr (21 mL/Hr) IV Continuous <Continuous>  hydrocortisone sodium succinate Injectable 50 milliGRAM(s) IV Push every 12 hours  insulin glargine Injectable (LANTUS) 60 Unit(s) SubCutaneous at bedtime  insulin glargine Injectable (LANTUS) 60 Unit(s) SubCutaneous every morning  insulin lispro (HumaLOG) corrective regimen sliding scale   SubCutaneous every 4 hours  linezolid  IVPB      linezolid  IVPB 600 milliGRAM(s) IV Intermittent every 12 hours  pantoprazole   Suspension 40 milliGRAM(s) Oral daily  senna Syrup 10 milliLiter(s) Oral daily    MEDICATIONS  (PRN):  acetaminophen   Tablet .. 650 milliGRAM(s) Oral every 6 hours PRN Temp greater or equal to 38C (100.4F), Mild Pain (1 - 3)  heparin   Injectable 57698 Unit(s) IV Push every 6 hours PRN For aPTT less than 40  heparin   Injectable 5000 Unit(s) IV Push every 6 hours PRN For aPTT between 40 - 57  hydrALAZINE Injectable 10 milliGRAM(s) IV Push every 4 hours PRN SBP>180  sodium chloride 0.9% lock flush 10 milliLiter(s) IV Push every 1 hour PRN Pre/post blood products, medications, blood draw, and to maintain line patency        Physical Exam:    Neurological: sedated    Pulmonary: unlabored, trachea midline, L ct small intermittent air leak     Cardiovascular: nsr    Gastrointestinal: soft, non-tender, non-distended, no rebound / guarding    : brown in place     Skin: warm, dry, no diaphoresis, no pallor, cyanosis, or jaundice    LABS:  CBC Full  -  ( 05 May 2020 05:43 )  WBC Count : 15.49 K/uL  RBC Count : 3.19 M/uL  Hemoglobin : 9.5 g/dL  Hematocrit : 32.4 %  Platelet Count - Automated : 277 K/uL  Mean Cell Volume : 101.6 fl  Mean Cell Hemoglobin : 29.8 pg  Mean Cell Hemoglobin Concentration : 29.3 gm/dL  Auto Neutrophil # : 13.41 K/uL  Auto Lymphocyte # : 0.78 K/uL  Auto Monocyte # : 0.76 K/uL  Auto Eosinophil # : 0.00 K/uL  Auto Basophil # : 0.04 K/uL  Auto Neutrophil % : 86.6 %  Auto Lymphocyte % : 5.0 %  Auto Monocyte % : 4.9 %  Auto Eosinophil % : 0.0 %  Auto Basophil % : 0.3 %    05-05    133<L>  |  95<L>  |  25.0<H>  ----------------------------<  223<H>  4.6   |  27.0  |  0.93    Ca    8.1<L>      05 May 2020 05:43  Phos  5.4     05-05  Mg     2.2     05-05    TPro  5.8<L>  /  Alb  2.1<L>  /  TBili  0.3<L>  /  DBili  x   /  AST  23  /  ALT  21  /  AlkPhos  52  05-04    PTT - ( 05 May 2020 21:20 )  PTT:88.4 sec    RECENT CULTURES:      LIVER FUNCTIONS - ( 04 May 2020 05:01 )  Alb: 2.1 g/dL / Pro: 5.8 g/dL / ALK PHOS: 52 U/L / ALT: 21 U/L / AST: 23 U/L / GGT: x

## 2020-05-06 NOTE — PROGRESS NOTE ADULT - SUBJECTIVE AND OBJECTIVE BOX
Patient with reported leak from ET tube with position related periodic desaturation    Concerns for balloon cuff leak     - Anesthesia contacted for ET tube exhange    - Chest x-Ray post procedure for evaluation

## 2020-05-06 NOTE — PROGRESS NOTE ADULT - SUBJECTIVE AND OBJECTIVE BOX
Called to evaluate patient already intubated with air leak and cuff not maintaining pressure.  Patient with good o2 saturation and vitals, but gurgling heard with each breath.  Decided to change tube since there might be tear in cuff.  Arrived with noel Parks and together, patient sedated, given paralytic, and  tube exchanged with Bougie and  ett 8.5 without incident.  Positive color change with co2 detector and bilateral equal breath sounds.  O2 saturation maintained greater than 95%.  Respiratory therapist at bedside.  Tube secured at 25 cm at lip.  Tolerated procedure well.

## 2020-05-06 NOTE — PROGRESS NOTE ADULT - ASSESSMENT
74y male COVID positive w/ hypoxic respiratory failure. Hospital course complicated by poorly controlled DM, fungemia, superimposed bacterial PNA.     Neuro: continue sedation w/ precedex, fentanyl PRN for pain, continue delirium precautions, daily sedation holiday    CV: norvasc & amiodarone for rate / rhythm control, no pressor requirements to maintain MAP > 65, continue invasive monitoring with arterial line    Pulm: S/p tube exchange.  Pt would be trach candidate but family undecided.  Continue mechanical ventilation, wean FiO2 / PEEP as tolerated    GI/Nutrition: NPO with tube feeds    /Renal: kevin for strict I&O, monitor kidney fxn    ID: fluconazole ending 5/1 for fungemia, merrem & zyvox started 4/29, awaiting finalization of sputum cultures / sensitivities    Endo: ISS.  AM Lantus ordered for elevated PM blood glucose.  PM remains at 50 due to controlled blood glucose in AM.      Skin: repositioning for DTI prevention while in bed    Heme/DVT Prophylaxis: SCDs / SQH for hypercoagulable state    Dispo:  SICU
74yMale presenting with: COVID 19. Remains intubated in SCIU. s/p cardioversion for afib.on amio gtt    Neuro: Encephalopathic, multifactorial.  Manage and optimize analgosedation.  Daily sedation holiday. Serial Neurologic assessments    CV: S/p cardioversion on amio gtt.  Continue invasive hemodynamic monitoring    Pulm: Mech vented.  Wean PEEP and FiO2 as tolerated.  Continue Pulmonary toilet.  Chest PT.  Prone if PF  <150    GI/Nutrition: Tube feeds. Bowel Regimen    /Renal: monitor UOP. Monitor BMP.  Replete Lytes as needed      HEME- DVT prophylaxis, SCDs    ID: Monitor fever curve, leukocytosis, procalcitonin     Endo:  Insulin gtt converted Lantus bid with ISS.  Monitor glucose    Dispo:  SICU
74yMale presenting with: COVID-19 - hypoxic respiratory failure, DM hyperglycemia, Chronic HTN, PNA - Ecoli, citerobacter, MRSA, Fungemia.    Neurological:  Mental status improving.  Wean Precedex as able for improved mentation.     Pulmonary: Able to transition to PSV and currently tolerating.  PEEP trending down.  FiO2 remains at 40%.  Will attempt to wean to extubate.  IF unable to wean PEEP or maintain RSBI < 100 on PSV, would highly consider Trach.  Family verbally agreed to this over phone last night with Me. Maintain Pplat < 30 and Attempt low rate SIMV and PSV  as able.     Cardiovascular: CW Amio.  Appreciate Cards Note and recs. Maintain MAPs >65.     Gastrointestinal: Glucerna TF At goal    Endo: Lantus increased further.  ISS Made Q 4 from Q 6 with improved glycemic control.  Continue to evaluate daily    Genitourinary: Trend Cr and U/O.    Heme: Hep drip.  PTT Full AC.  Transition to PO After acute phase.    ID: Cefepime and Zyvox x 7 for PNA.    Dispo: Critical care as above.
75 y/o with ROSA MARIA, DM, HTN, presents to ED with SOB and syncope, COVID 19, now intubated in SICU
A/P:  As per chart:  Patient is a 74-year-old male former smoker with PMHx of DM2, HTN and sleep apnea (noncompliant with CPAP and stated that has not used cpap in years ) who was brought to [facility] ED after being found in the bathroom covered in faeces by his friend who called EMS. Patient has no recollection of some of these events but remembers the police in his house and being brought to the hospital. He denies any cp, no dizziness, couch, fever, shortness of breath in the preceding days but states he has been having progressive weakness for a few months now. He denies any prior similar episodes, no sick contacts or recent travel.  As per ED physician, patient hypoxic down to high 80s on room air, with improvement to 94% on 4L NC.  As of 4/3/2020, Due to the nature of this patient's COVID-19 isolation status, no bedside physical exam done to limit spread of infection. Examination highlights were provided by bedside nurse. Objective data were reviewed in detail. Pt maintained NSR HR @ 66, no ectopy.  Will sign off.      5/1: Reconsulted for bradycardia.  Discussed with MARIA ESTHER Ellington.  Patient remains intubated on Precedex. During course patient had rapid AFib w/RVR (likely r/t covid disease process), has been on Amiodarone, and has been NSR.  For the past week his rate has become bradycardic, average mid 50s with low of 51.  BP is high normal with occasional hypertension w/SBP>160.    Bradycardia- No more episodes of bradycardia.      pAF  c/w amiodarone  currently on heparin gtt, transition to PO AC when able    COVID-19  PMT and ID management    Hypokalemia  replete to maintain K>4    Type 2 diabetes mellitus with hyperglycemia, with long-term current use of insulin.    Uncontrolled blood sugar  management per PMT    Essential hypertension  Monitor BP
A/P:  As per chart:  Patient is a 74-year-old male former smoker with PMHx of DM2, HTN and sleep apnea (noncompliant with CPAP and stated that has not used cpap in years ) who was brought to [facility] ED after being found in the bathroom covered in faeces by his friend who called EMS. Patient has no recollection of some of these events but remembers the police in his house and being brought to the hospital. He denies any cp, no dizziness, couch, fever, shortness of breath in the preceding days but states he has been having progressive weakness for a few months now. He denies any prior similar episodes, no sick contacts or recent travel.  As per ED physician, patient hypoxic down to high 80s on room air, with improvement to 94% on 4L NC.  As of 4/3/2020, Due to the nature of this patient's COVID-19 isolation status, no bedside physical exam done to limit spread of infection. Examination highlights were provided by bedside nurse. Objective data were reviewed in detail. Pt maintained NSR HR @ 66, no ectopy.  Will sign off.      5/1: Reconsulted for bradycardia.  Discussed with MARIA ESTHER Ellington.  Patient remains intubated on Precedex. During course patient had rapid AFib w/RVR (likely r/t covid disease process), has been on Amiodarone, and has been NSR.  For the past week his rate has become bradycardic, average mid 50s with low of 51.  BP is high normal with occasional hypertension w/SBP>160.    Bradycardia  Sinus; average mid 50s with low of 51  in setting of sedation in conjunction w/covid disease process  c/t monitor telemetry, no medication adjustments at this time    pAF  c/w amiodarone  currently on heparin gtt, transition to PO AC when able    COVID-19  PMT and ID management    Hypokalemia  replete to maintain K>4    Type 2 diabetes mellitus with hyperglycemia, with long-term current use of insulin.    Uncontrolled blood sugar  management per PMT    Essential hypertension  Monitor BP    Thank you for allowing me to participate in care of your patient.   Please call as needed.
ASSESSMENT/PLAN:  74yMale presenting with: Acute hypoxic resp failure COVID-19, Chronic DM, hyperglycemia, Chronic HTN, PNA Ecoli, Citerobacter, MRSA, Fungemia. Hypernatremia.    Neurological: Continue precedex for analgosedation    Pulmonary: ARDS secondary to COVID, large mucous plug, increasing oxygen requirements - presumed PNA started on abx.  If AM p/f continues to < 150 will prone.  Ptx- decompressed with chest tube .     Cardiovascular: CW Amio, Norvasc and Keep MAPs >65. Lactate 3.1 secondary to hypovolemia of chest tube outputs.     Gastrointestinal: CW Glucerna.     Genitourinary: Monitor kidney function, hypovolemic - given back 1L IVF     Heme: SCDs, Heparin gtt     ID:   Iso for Covid. Bacterial PNA - abx started 5/5    Endo: Hyperglycemic -ISS     Skin: Frequent off loading and repositioning     Lines/ Tubes: Keep all invasive lines and tubes unless meets criteria for liberation.     Dispo: ICU
ASSESSMENT/PLAN:  74yMale presenting with: COVID 19. Remains intubated in SCIU. s/p cardioversion for afib.on amio gtt    Neuro: Encephalopathic, multifactorial however improving.   Manage and optimize analgosedation.      CV: S/p cardioversion on amio gtt- convert to 200mg daily today. Otherwise remains hemodynamically well.       Pulm: COVID+- ARDS. Hypoxic resp failure.  P/f 200.   Wean PEEP and FiO2 as tolerated.       GI/Nutrition: Tube feeds. Bowel Regimen    /Renal: monitor UOP. Monitor BMP.  Replete Lytes as needed      HEME- DVT prophylaxis, SCDs    ID: COVID +     Endo:  Lantus bid with ISS.  Monitor glucose remains off insulin gtt    Dispo:  SICU
ASSESSMENT/PLAN: 74y male COVID + w/ hypoxic respiratory failure. Hospital course complicated by superimposed bacterial PNA and most recently, fungemia.     Neuro: continue analgesia w/ dilaudid gtt @ current rate, PRN Fentanyl, klonopin BID, delirium precautions, daily sedation holiday    CV: maintain MAPS > 65, PO amiodarone, continue invasive monitoring with arterial line    Pulm: P/F with slight improvement; wean FiO2 / PEEP as tolerated on SIMV    GI/Nutrition: NPO with tube feeds - hold feeds if prone. Continue IVF, free H20 increased to 300q4h for worsening hyperNa    /Renal: Benitez for strict I&O, monitor kidney fxn, Cr appears to be plateauing at approx 1.7-1.8.    ID: Switched to Micafungin, Continue Cefepime & Vancomycin for superimposed bacterial PNA to end 4/19    Endo: blood glucose downtrending, will decrease nighttime Lantus from 60u to 40u and continue remainder of diabetic regimen     Skin: repositioning for DTI prevention while in bed    Heme/DVT Prophylaxis: SCDs / heparin gtt for hypercoagulable state, goal PTT 58-99
ASSESSMENT/PLAN: 74y male COVID + w/ hypoxic respiratory failure. Hospital course complicated by superimposed bacterial PNA and most recently, fungemia.     Neuro: continue analgesia w/ dilaudid gtt, attempt tp decrease rate , PRN Klonopin BID, delirium precautions, daily sedation holiday    CV: maintain MAPS > 65, continue invasive monitoring with arterial line. soft MAP, andnoted drop in hgb, if continues to dispaly MAP <65 will give 1 U PRBC     Pulm: P/F improved; wean FiO2 / PEEP as tolerated on SIMV    GI/Nutrition: NPO with tube feeds - hold feeds if prone. Continue free H20 , Na improving     /Renal: Benitez for strict I&O, monitor kidney fxn, Cr appears to be plateauing at approx. 1.7-1.8.    ID: Continue Micafungin, Continue Cefepime & Vancomycin for superimposed bacterial PNA to end 4/19    Endo: blood glucose downtrending, Lantus 40u and continue remainder of diabetic regimen     Skin: repositioning for DTI prevention while in bed    Heme/DVT Prophylaxis: SCDs / heparin gtt for hypercoagulable state, goal PTT 58-99
As per chart:  Patient is a 74-year-old male former smoker with PMHx of DM2, HTN and sleep apnea (noncompliant with CPAP and stated that has not used cpap in years ) who was brought to [facility] ED after being found in the bathroom covered in faeces by his friend who called EMS. Patient has no recollection of some of these events but remembers the police in his house and being brought to the hospital. He denies any cp, no dizziness, couch, fever, shortness of breath in the preceding days but states he has been having progressive weakness for a few months now. He denies any prior similar episodes, no sick contacts or recent travel.  As per ED physician, patient hypoxic down to high 80s on room air, with improvement to 94% on 4L NC.  As of 4/3/2020, Due to the nature of this patient's COVID-19 isolation status, no bedside physical exam done to limit spread of infection. Examination highlights were provided by bedside nurse. Objective data were reviewed in detail. Pt maintained NSR HR @ 66, no ectopy.  Will sign off.        Syncope and collapse.  Likely secondary to hypoxia and/or vasovagal episode due to volume depletion from poor PO intake.  CT head negative  EKG with no acute changes, unchanged from on 9/30/2018   Observe on telemetry  Orthostatic BP changes     Hypoxia  Likely secondary to COVID-19.  Supplement O2 as needed  Possible COPD component as well.     COVID-19  ID consultation       MIKIE     Likely secondary to volume depletion  Avoid dehydration  IVF  Encourage PO intake.    Type 2 diabetes mellitus with hyperglycemia, with long-term current use of insulin.    Uncontrolled blood sugar    Essential hypertension  Monitor BP  Orthostatic BP checks when able
74y Male PMH of HTN, CM, ROSA MARIA, presented to the ER status post syncopal episode, found to be hypoxemic secondary to COVID 19 infection. Remains encephlopathic, tolerating current vent settings, no hemodynamically normal, tolerating tube feeds and fingersticks now controlled on lantus and sliding scale, MIKIE stable, uop appropriate
ASSESSMENT/PLAN:  74yMale presenting with: Acute hypoxic resp failure COVID-19, Chronic DM, hyperglycemia, Chronic HTN, PNA Ecoli, Citerobacter, MRSA, Fungemia. Hypernatremia.    Neurological: Continue to wean Precedex however, assists with anxiety to get prepared for extubation     Pulmonary: Tolerating 8/8.  Would check RSBI, Cuff leak , NIF.  Already prepped with Steroids for extubation.  If RSBI <90, PF> 200 and good Mental status, would extubate.  If fails or can not be safely extubated over next 1-2 days would highly consider Trach.    Cardiovascular: CW Amio, Norvasc and Keep MAPs >65.    Gastrointestinal: CW Glucerna.     Genitourinary: Monitor kidney function, would consider tov when extubated     Heme: SCDs, Heparin gtt     ID:   Iso for Covid.    Endo: Steroids 24 hr prior and 48 hr post extubation.     Skin: Frequent off loading and repositioning     Lines/ Tubes: Keep all invasive lines and tubes unless meets criteria for liberation.     Dispo: ICU

## 2020-05-07 NOTE — CHART NOTE - NSCHARTNOTEFT_GEN_A_CORE
I spoke with Son Claudio and gave updates.  All questions answered. 82M h/o COPD, HTN, h/o TIA, chronic LBP, hypothyroidism, BPH c/b urinary retention (previously with lyon) who was BIBEMS for fevers with rigors.  Patient was seen by  Dr Martinez 4d PTA and as he had dark urine and was started on Bactrim.  Patient only started to take abx the morning PTA.  At 8p evening PTA, patient stated to have F/C with Tmax 100.7, NBNB N/V x1 (clear fluid), and was lethargic.  As he was shaking so much, he called EMS and was brought to the hospital.   Endorses weakness, large amount of urine when he urinates, suprapubic TTP; denies flank pain, dysuria, urinary hesitancy/frequency, hematuria.  No prior h/o UTIs.  No dizziness, CP, SOB, cough, nasal sx, abdominal pain, C/D, myalgias/athralgias.    Of note, patient was seen recently by PMD Dr Samuel on 4/20 and was noted to have 2mo of poor appetite, anhedonia, and listlessness, was started on Celexa 10mg QD with plans to uptitrate to 20 after 4 weeks, and TSH, CEA, and  were check and WNL.    ED Course  VS Tmax 101.2 HR  BP /61-88 RR 22 SpO2 96%NC2L  WBC 11.2 ANC 10,200 (96.1%) Cr 0.88 (baseline 0.8) lactate 1.8  UA large LE, WBC >50 with clumps  CXR R basilar atelectasis  Given CTX 1g, NS 3L, Ofirmev.  BCx x2 and UCx sent.

## 2020-05-07 NOTE — PROGRESS NOTE ADULT - ATTENDING COMMENTS
The patient was seen and examined  Events noted  New onset change in mental status    Neurologic:  Sedated  Respiratory:  VC 70%, PEEP +12; PFR=95  Hemodynamic:  Normal  Renal:  Urine flow acceptable, Cr 1.25 (increased)  GI: TEN  Hematologic:  Hgb 7.6  ID:  WBC 9, afebrile    Plan:  The change in status is likely related to infection; cultures sent and antibiotics started  Nutritional support  Anticoagulation

## 2020-05-07 NOTE — PROGRESS NOTE ADULT - SUBJECTIVE AND OBJECTIVE BOX
24h Events: increased agitation overnight otherwidse no new events    ICU Vital Signs Last 24 Hrs  T(C): 36.6 (07 May 2020 03:34), Max: 37 (06 May 2020 08:00)  T(F): 97.8 (07 May 2020 03:34), Max: 98.6 (06 May 2020 08:00)  HR: 86 (07 May 2020 04:00) (61 - 93)  BP: --  BP(mean): --  ABP: 125/54 (07 May 2020 00:00) (111/58 - 190/74)  ABP(mean): 73 (07 May 2020 00:00) (71 - 107)  RR: 24 (07 May 2020 00:00) (24 - 33)  SpO2: 90% (07 May 2020 04:00) (88% - 100%)      I&O's Detail    05 May 2020 07:01  -  06 May 2020 07:00  --------------------------------------------------------  IN:    dexmedetomidine Infusion: 332.7 mL    Enteral Tube Flush: 60 mL    Free Water: 200 mL    Glucerna 1.5: 468 mL    heparin  Infusion.: 266 mL    multiple electrolytes Injection Type 1 Bolus: 1000 mL    Solution: 550 mL    Solution: 300 mL  Total IN: 3176.7 mL    OUT:    Chest Tube: 1200 mL    Indwelling Catheter - Urethral: 1085 mL  Total OUT: 2285 mL    Total NET: 891.7 mL      06 May 2020 07:01  -  07 May 2020 04:44  --------------------------------------------------------  IN:    dexmedetomidine Infusion: 175.7 mL    Free Water: 200 mL    Glucerna 1.5: 364 mL    heparin  Infusion.: 66 mL  Total IN: 805.7 mL    OUT:    Chest Tube: 350 mL    Indwelling Catheter - Urethral: 855 mL  Total OUT: 1205 mL    Total NET: -399.3 mL          ABG - ( 06 May 2020 18:28 )  pH, Arterial: 7.46  pH, Blood: x     /  pCO2: 41    /  pO2: 78    / HCO3: 29    / Base Excess: 5.1   /  SaO2: 97                  MEDICATIONS  (STANDING):  ALBUTerol    90 MICROgram(s) HFA Inhaler 6 Puff(s) Inhalation every 6 hours  aMIOdarone    Tablet 200 milliGRAM(s) Oral daily  amLODIPine   Tablet 5 milliGRAM(s) Oral daily  aspirin  chewable 81 milliGRAM(s) Oral daily  chlorhexidine 0.12% Liquid 15 milliLiter(s) Oral Mucosa two times a day  chlorhexidine 2% Cloths 1 Application(s) Topical daily  dexMEDEtomidine Infusion 0.3 MICROgram(s)/kG/Hr (9.42 mL/Hr) IV Continuous <Continuous>  dextrose 5%. 1000 milliLiter(s) (50 mL/Hr) IV Continuous <Continuous>  insulin glargine Injectable (LANTUS) 60 Unit(s) SubCutaneous at bedtime  insulin glargine Injectable (LANTUS) 60 Unit(s) SubCutaneous every morning  insulin lispro (HumaLOG) corrective regimen sliding scale   SubCutaneous every 4 hours  oxyCODONE    Solution 5 milliGRAM(s) Oral every 4 hours  pantoprazole   Suspension 40 milliGRAM(s) Oral daily  senna Syrup 10 milliLiter(s) Oral daily    MEDICATIONS  (PRN):  acetaminophen   Tablet .. 650 milliGRAM(s) Oral every 6 hours PRN Temp greater or equal to 38C (100.4F), Mild Pain (1 - 3)  hydrALAZINE Injectable 10 milliGRAM(s) IV Push every 4 hours PRN SBP>180  HYDROmorphone  Injectable 1 milliGRAM(s) IV Push every 3 hours PRN Moderate Pain (4 - 6)  sodium chloride 0.9% lock flush 10 milliLiter(s) IV Push every 1 hour PRN Pre/post blood products, medications, blood draw, and to maintain line patency        Physical Exam:    Neurological: precedex gtt for sedation / analgesia with PRN pushes of fentanyl and dilaudid    Pulmonary: mechanical ventilation; AC 24/450/12/70%    Cardiovascular: S1, S2, regular rate & rhythm    Gastrointestinal: soft, non-tender, non-distended, no rebound / guarding, difficult to assess with sedation on     : brown in place draining clear, yellow urine    Skin: warm, dry, no diaphoresis, no pallor, cyanosis, or jaundice    LABS:  CBC Full  -  ( 06 May 2020 20:41 )  WBC Count : 9.67 K/uL  RBC Count : 2.37 M/uL  Hemoglobin : 7.2 g/dL  Hematocrit : 23.3 %  Platelet Count - Automated : 140 K/uL  Mean Cell Volume : 98.3 fl  Mean Cell Hemoglobin : 30.4 pg  Mean Cell Hemoglobin Concentration : 30.9 gm/dL  Auto Neutrophil # : 7.77 K/uL  Auto Lymphocyte # : 1.06 K/uL  Auto Monocyte # : 0.73 K/uL  Auto Eosinophil # : 0.01 K/uL  Auto Basophil # : 0.00 K/uL  Auto Neutrophil % : 80.4 %  Auto Lymphocyte % : 11.0 %  Auto Monocyte % : 7.5 %  Auto Eosinophil % : 0.1 %  Auto Basophil % : 0.0 %    05-06    135  |  96<L>  |  35.0<H>  ----------------------------<  95  4.4   |  24.0  |  1.28    Ca    7.9<L>      06 May 2020 05:24  Phos  4.0     05-06  Mg     2.2     05-06    TPro  5.4<L>  /  Alb  2.3<L>  /  TBili  0.3<L>  /  DBili  x   /  AST  16  /  ALT  18  /  AlkPhos  44  05-06    PTT - ( 06 May 2020 14:17 )  PTT:57.8 sec    RECENT CULTURES:  05-04 .Blood Blood-Venous XXXX XXXX   No growth at 48 hours        LIVER FUNCTIONS - ( 06 May 2020 05:24 )  Alb: 2.3 g/dL / Pro: 5.4 g/dL / ALK PHOS: 44 U/L / ALT: 18 U/L / AST: 16 U/L / GGT: x                   ASSESSMENT/PLAN:  74M DM2, HTN and sleep apnea (noncompliant with CPAP and stated that has not used cpap in years ) who was brought to [facility] ED after being found in the bathroom covered in faeces by his friend, found to be hypoxic in ED that initially improved on nasal canula however needed to be intubated on HD#2    Neuro: sedation / analgesia with precedex delirium precautions, daily sedation holiday    CV: continue invasive monitoring with arterial line, no pressors    Pulm: wean FiO2 / PEEP as tolerated, PS trial in am    GI/Nutrition: NPO with tube feeds     /Renal: kevin for strict I&O, monitor kidney fxn    ID: no current need for abx    Endo: monitor blood glucose    Skin: repositioning for DTI prevention while in bed    Heme/DVT Prophylaxis: SCDs / SQH / lovenox ppx

## 2020-05-07 NOTE — CHART NOTE - NSCHARTNOTEFT_GEN_A_CORE
Called to bedside by nurse for change in pt's pupils.  Pt's left pupil noted to be 6mm round and briskly reactive, right pupil 4mm round and briskly reactive.  All sedation held.  Pt awake however delirious, not following commands.  Noted to cross midline with LUE, shoulder shrugs RUE however does not lift off of bed.  BL LE with minimal movement equally.     Pt has been off Heparin gtt since yesterday for increased bleeding from mouth and ETT.  Will obtain STAT Head CT.      Pt also noted to have acute desaturation.  ETT lavaged and suctioned with blood clots removed.  Recruitment maneuver performed with improvement of SpO2.  FiO2 increased to 100%/  Pt placed on Rocuronium and Dilaudid gtt for transport to CT.

## 2020-05-08 NOTE — CONSULT NOTE ADULT - SUBJECTIVE AND OBJECTIVE BOX
[facility]                                     Neu[facility] at [place]                                 Donato Cardoza, & Darío                                  [address]                                        [place], [place], [address]                                             [phone]    CC: right sided weakness  HPI:  The patient is a 74y Male who presented originally on 4/2/2020 as an unresponsive patient found in feces.  He subsequently tested positive for COVID-19.  Yesterday morning it was noted that he had right sided weakness.  Head CT was done and I was called today for neurological consultation.  The patient is intubated and not attending to his environment, therefore this is taken form the chart.    PAST MEDICAL & SURGICAL HISTORY:  Diabetes mellitus  Essential hypertension  No significant past surgical history      MEDICATIONS  (STANDING):  ALBUTerol    90 MICROgram(s) HFA Inhaler 6 Puff(s) Inhalation every 6 hours  aMIOdarone    Tablet 200 milliGRAM(s) Oral daily  amLODIPine   Tablet 5 milliGRAM(s) Oral daily  aspirin  chewable 81 milliGRAM(s) Oral daily  chlorhexidine 0.12% Liquid 15 milliLiter(s) Oral Mucosa two times a day  chlorhexidine 2% Cloths 1 Application(s) Topical daily  dexMEDEtomidine Infusion 0.3 MICROgram(s)/kG/Hr (9.42 mL/Hr) IV Continuous <Continuous>  dextrose 5%. 1000 milliLiter(s) (50 mL/Hr) IV Continuous <Continuous>  heparin   Injectable 7500 Unit(s) SubCutaneous every 8 hours  HYDROmorphone Infusion 1 mG/Hr (1 mL/Hr) IV Continuous <Continuous>  insulin glargine Injectable (LANTUS) 60 Unit(s) SubCutaneous at bedtime  insulin glargine Injectable (LANTUS) 60 Unit(s) SubCutaneous every morning  insulin lispro (HumaLOG) corrective regimen sliding scale   SubCutaneous every 4 hours  linezolid  IVPB 600 milliGRAM(s) IV Intermittent every 12 hours  linezolid  IVPB      pantoprazole   Suspension 40 milliGRAM(s) Oral daily  piperacillin/tazobactam IVPB.. 3.375 Gram(s) IV Intermittent every 8 hours  senna Syrup 10 milliLiter(s) Oral daily    MEDICATIONS  (PRN):  acetaminophen   Tablet .. 650 milliGRAM(s) Oral every 6 hours PRN Temp greater or equal to 38C (100.4F), Mild Pain (1 - 3)  hydrALAZINE Injectable 10 milliGRAM(s) IV Push every 4 hours PRN SBP>180  HYDROmorphone  Injectable 1 milliGRAM(s) IV Push every 3 hours PRN Moderate Pain (4 - 6)  sodium chloride 0.9% lock flush 10 milliLiter(s) IV Push every 1 hour PRN Pre/post blood products, medications, blood draw, and to maintain line patency      Allergies    lisinopril (Unknown)  losartan (Rash)    Intolerances        SOCIAL HISTORY:  unable to assess, but per H&P former tob, no EtOh/drugs    FAMILY HISTORY:  Unable to assess        ROS: 14 point ROS negative other than what is present in HPI or below    Vital Signs Last 24 Hrs  T(C): 36.6 (08 May 2020 12:00), Max: 37.8 (07 May 2020 15:44)  T(F): 97.8 (08 May 2020 12:00), Max: 100 (07 May 2020 15:44)  HR: 87 (08 May 2020 12:00) (61 - 91)  BP: --  BP(mean): --  RR: 25 (08 May 2020 12:00) (20 - 35)  SpO2: 89% (08 May 2020 12:00) (77% - 99%)    Detailed Neurologic Exam:    Mental status: The patient is awake, intubated and not attending to his environment.  Unable to assess orientation or language.  he shakes his head side to side, appears slightly agitated    Cranial nerves: Pupils react symmetrically to light. There is b/l blink  to threat. Extraocular motion is full with dolls eyes maneuvers. There is no ptosis. Facial sensation is not able to be assessed. Facial musculature is grossly symmetric, difficult to fully assess qwith ETT in place.  Unable to assess palate, shoulder and tongue.    Motor: There is normal bulk and tone.  There is no tremor.  moves left arm vigorously, no movement right hand, minimal movement in either leg    Sensation: Grimace to pinch in 4 extremities    Reflexes: muted throughout and plantar responses are flexor on left, extensor on right    Cerebellar: unable to assess  dysmetria on finger to nose testing.    Gait: unable to assess due to condition  LABS:                         6.6    10.20 )-----------( 122      ( 08 May 2020 04:38 )             21.8       05-08    134<L>  |  98  |  44.0<H>  ----------------------------<  143<H>  5.3   |  26.0  |  1.38<H>    Ca    7.8<L>      08 May 2020 04:38  Phos  4.3     05-08  Mg     2.4     05-08    TPro  5.4<L>  /  Alb  2.3<L>  /  TBili  0.3<L>  /  DBili  x   /  AST  19  /  ALT  19  /  AlkPhos  51  05-08      PTT - ( 07 May 2020 04:31 )  PTT:26.4 sec    RADIOLOGY & ADDITIONAL STUDIES (independently reviewed unless otherwise noted):  head CT shows diffuse atrophy and significant SVID, possible left BG lacunar infarct vs SVID, no blood, no mass

## 2020-05-08 NOTE — CONSULT NOTE ADULT - ASSESSMENT
74M  hx IDDM2, HTN, ROSA MARIA presented due to syncopal episode,  hypoxia secondary to COVID-19 infection. Patient is intubated in the ED emergently.
The patient is a 74y Male who is followed by neurology because of right sided weakness    Stroke  unknown time of onset, at least >24 hours ago  on ASA, but hgb trending down  may need to come off ASA    will check lipid panel, hypercoag w/u and repeat CRP, ferritin, d-dimer  may need statin depending upon LDL  will check carotid duplex    PT/OT when able to tolerate  swallow eval in future post extubation prior to starting PO    discussed with Hank MAYO from SICU    will follow with you    Gera Sewell MD PhD   [id number]
74M  hx IDDM2, HTN, ROSA MARIA presented due to syncopal episode,  hypoxia secondary to COVID-19 infection.    Syncope and collapse.  Likely secondary to hypoxia and/or vasovagal episode due to volume depletion from poor PO intake.  CT head negative  EKG with no acute changes, unchanged from on 9/30/2018   Observe on telemetry  Orthostatic BP changes    Hypoxia  Likely secondary to COVID-19.  Supplement O2 as needed  Possible COPD component as well.     COVID-19  ID consultation      MIKIE     Likely secondary to volume depletion  Avoid dehydration  IVF  Encourage PO intake.    Type 2 diabetes mellitus with hyperglycemia, with long-term current use of insulin.    Uncontrolled blood sugar    Essential hypertension.  Monitor BP  Orthostatic BP checks

## 2020-05-08 NOTE — PROVIDER CONTACT NOTE (CRITICAL VALUE NOTIFICATION) - TEST AND RESULT REPORTED:
k 6.1
+ Bl Cx aerobic bottle- positive for yeast
Fibrinogen 721
H/H= 6.6/21.8
HGB from ABG 6.3
PTT greater than 200
PTT- more than 200
Troponin 0.11
Troponin 0.22
aPTT = 124.1
na 160

## 2020-05-08 NOTE — PROGRESS NOTE ADULT - SUBJECTIVE AND OBJECTIVE BOX
24h Events: episode of ? stroke early this am, CT negative, increasing hypoxia throughout the day    ICU Vital Signs Last 24 Hrs  T(C): 36.7 (08 May 2020 00:32), Max: 37.8 (07 May 2020 15:44)  T(F): 98.1 (08 May 2020 00:32), Max: 100 (07 May 2020 15:44)  HR: 80 (08 May 2020 02:00) (60 - 100)  BP: --  BP(mean): --  ABP: 115/51 (08 May 2020 02:00) (93/50 - 200/67)  ABP(mean): 71 (08 May 2020 02:00) (63 - 104)  RR: 21 (08 May 2020 02:00) (20 - 35)  SpO2: 94% (08 May 2020 02:00) (77% - 99%)      I&O's Detail    06 May 2020 07:01  -  07 May 2020 07:00  --------------------------------------------------------  IN:    dexmedetomidine Infusion: 347.7 mL    Free Water: 400 mL    Glucerna 1.5: 468 mL    heparin  Infusion.: 66 mL    HYDROmorphone  Infusion: 2 mL    rocuronium Infusion: 24.2 mL  Total IN: 1307.9 mL    OUT:    Chest Tube: 710 mL    Indwelling Catheter - Urethral: 855 mL  Total OUT: 1565 mL    Total NET: -257.1 mL      07 May 2020 07:01  -  08 May 2020 03:06  --------------------------------------------------------  IN:    dexmedetomidine Infusion: 200.8 mL    Free Water: 200 mL    Glucerna 1.5: 416 mL    HYDROmorphone  Infusion: 7 mL    Solution: 100 mL    Solution: 300 mL  Total IN: 1223.8 mL    OUT:    Chest Tube: 400 mL    Indwelling Catheter - Urethral: 760 mL    Voided: 300 mL  Total OUT: 1460 mL    Total NET: -236.2 mL          ABG - ( 07 May 2020 04:41 )  pH, Arterial: 7.40  pH, Blood: x     /  pCO2: 47    /  pO2: 67    / HCO3: 28    / Base Excess: 3.8   /  SaO2: 94                  MEDICATIONS  (STANDING):  ALBUTerol    90 MICROgram(s) HFA Inhaler 6 Puff(s) Inhalation every 6 hours  aMIOdarone    Tablet 200 milliGRAM(s) Oral daily  amLODIPine   Tablet 5 milliGRAM(s) Oral daily  aspirin  chewable 81 milliGRAM(s) Oral daily  chlorhexidine 0.12% Liquid 15 milliLiter(s) Oral Mucosa two times a day  chlorhexidine 2% Cloths 1 Application(s) Topical daily  dexMEDEtomidine Infusion 0.3 MICROgram(s)/kG/Hr (9.42 mL/Hr) IV Continuous <Continuous>  dextrose 5%. 1000 milliLiter(s) (50 mL/Hr) IV Continuous <Continuous>  heparin   Injectable 7500 Unit(s) SubCutaneous every 8 hours  HYDROmorphone Infusion 1 mG/Hr (1 mL/Hr) IV Continuous <Continuous>  insulin glargine Injectable (LANTUS) 60 Unit(s) SubCutaneous at bedtime  insulin glargine Injectable (LANTUS) 60 Unit(s) SubCutaneous every morning  insulin lispro (HumaLOG) corrective regimen sliding scale   SubCutaneous every 4 hours  linezolid  IVPB 600 milliGRAM(s) IV Intermittent every 12 hours  linezolid  IVPB      pantoprazole   Suspension 40 milliGRAM(s) Oral daily  piperacillin/tazobactam IVPB.. 3.375 Gram(s) IV Intermittent every 8 hours  senna Syrup 10 milliLiter(s) Oral daily    MEDICATIONS  (PRN):  acetaminophen   Tablet .. 650 milliGRAM(s) Oral every 6 hours PRN Temp greater or equal to 38C (100.4F), Mild Pain (1 - 3)  hydrALAZINE Injectable 10 milliGRAM(s) IV Push every 4 hours PRN SBP>180  HYDROmorphone  Injectable 1 milliGRAM(s) IV Push every 3 hours PRN Moderate Pain (4 - 6)  sodium chloride 0.9% lock flush 10 milliLiter(s) IV Push every 1 hour PRN Pre/post blood products, medications, blood draw, and to maintain line patency        Physical Exam:    Neurological: precedex and dilaudid gtt for sedation / analgesia    Pulmonary: mechanical ventilation; AC 24/450/14/100%    Cardiovascular: S1, S2, regular rate & rhythm    Gastrointestinal: soft, non-distended, exam limited by sedation    : brown in place draining yellow urine    Skin: warm, dry, no diaphoresis, no pallor, cyanosis, or jaundice    LABS:  CBC Full  -  ( 07 May 2020 04:31 )  WBC Count : 9.00 K/uL  RBC Count : 2.43 M/uL  Hemoglobin : 7.6 g/dL  Hematocrit : 23.8 %  Platelet Count - Automated : 163 K/uL  Mean Cell Volume : 97.9 fl  Mean Cell Hemoglobin : 31.3 pg  Mean Cell Hemoglobin Concentration : 31.9 gm/dL  Auto Neutrophil # : 7.33 K/uL  Auto Lymphocyte # : 0.86 K/uL  Auto Monocyte # : 0.66 K/uL  Auto Eosinophil # : 0.01 K/uL  Auto Basophil # : 0.02 K/uL  Auto Neutrophil % : 81.5 %  Auto Lymphocyte % : 9.6 %  Auto Monocyte % : 7.3 %  Auto Eosinophil % : 0.1 %  Auto Basophil % : 0.2 %    05-07    135  |  99  |  38.0<H>  ----------------------------<  124<H>  4.3   |  26.0  |  1.25    Ca    8.1<L>      07 May 2020 04:31  Phos  4.0     05-07  Mg     2.3     05-07    TPro  5.8<L>  /  Alb  2.5<L>  /  TBili  0.4  /  DBili  x   /  AST  21  /  ALT  19  /  AlkPhos  48  05-07    PTT - ( 07 May 2020 04:31 )  PTT:26.4 sec    RECENT CULTURES:  05-04 .Blood Blood-Venous XXXX XXXX   No growth at 48 hours        LIVER FUNCTIONS - ( 07 May 2020 04:31 )  Alb: 2.5 g/dL / Pro: 5.8 g/dL / ALK PHOS: 48 U/L / ALT: 19 U/L / AST: 21 U/L / GGT: x                   ASSESSMENT/PLAN:  74M DM2, HTN and sleep apnea (noncompliant with CPAP and stated that has not used cpap in years ) who was brought to [facility] ED after being found in the bathroom covered in faeces by his friend, found to be hypoxic in ED that initially improved on nasal canula however needed to be intubated on HD#2    Neuro: sedation / analgesia with Precedex and Dilaudid delirium precautions, daily sedation holiday    CV: continue invasive monitoring with arterial line    Pulm: wean FiO2 / PEEP as tolerated    GI/Nutrition: NPO with tube feeds     /Renal: brown for strict I&O, monitor kidney fxn    ID: zyvox/zosyn started for suspected pneumonia, cultures pendinf    Endo: monitor blood glucose    Skin: repositioning for DTI prevention while in bed    Heme/DVT Prophylaxis: SCDs / SQH

## 2020-05-08 NOTE — PROVIDER CONTACT NOTE (CRITICAL VALUE NOTIFICATION) - NAME OF MD/NP/PA/DO NOTIFIED:
dr fong
ASHLY Mckinney
Anamika MAYO
Dr. Garcia/ EMILY
EMILY MAYO
Juan Antonio Delgado, PA
MARIA ESTHER Schmitz)/EMILY
Nat MAYO
Sousa
Yao, [facility] Center[facility]ABHJOT MAYO
fermin tomas np

## 2020-05-08 NOTE — PROGRESS NOTE ADULT - ATTENDING COMMENTS
73 yo M with PMH of DM, HTN, presents with ARDS due to COVID 19.  Intubated, vented, and sedated. open eyes, decrease movement of R side, follow command on left, GCS 10T. PF ratio 94. CT scan head is negative  L CT with small intermittent leak, Hgb 6.6 and 1 uPRBC transfusion  Continue cardiac Monitoring  Continue wean vent as tolerated.   Continue sedation to enhance vent wean.  Continue to control BG  with lantus and high ISS   Continue zyvox and Zosyn for pneumonia  VTE ppx SCD/heparin SQ  Continue ICU care for cardiopulmonary support         code [id number]       CCT 50 min . 73 yo M with PMH of DM, HTN, presents with ARDS due to COVID 19.  Intubated, vented, and sedated. open eyes, decrease movement of R side, follow command on left, GCS 10T. PF ratio 94. CT scan head is negative  L CT with small intermittent leak, Hgb 6.6 and 1 uPRBC transfusion  Obtain neurology consult for hemiparesis.  Continue cardiac Monitoring  Continue wean vent as tolerated.   Continue sedation to enhance vent wean.  Continue to control BG  with lantus and high ISS   Continue zyvox and Zosyn for pneumonia  VTE ppx SCD/heparin SQ  Continue ICU care for cardiopulmonary support         code [id number]       CCT 50 min . 75 yo M with PMH of DM, HTN, presents with ARDS due to COVID 19.  Intubated, vented, and sedated. open eyes, decrease movement of R side, follow command on left, GCS 10T. PF ratio 94. CT scan head is negative  L CT with small intermittent leak, Hgb 6.6 and 1 uPRBC transfusion  Obtain neurology consult for hemiparesis.  Continue cardiac Monitoring   Continue wean vent as tolerated and to labile pulmonary to tolerate prone  Continue sedation to enhance vent wean.  Continue to control BG  with lantus and high ISS   Continue zyvox and Zosyn for pneumonia  VTE ppx SCD/heparin SQ  Continue ICU care for cardiopulmonary support         code [id number]       CCT 50 min .

## 2020-05-09 NOTE — PROGRESS NOTE ADULT - ATTENDING COMMENTS
pt has continued to worsen  has developed H type covid 19 ards with pf 62. dw the family and they agree for comfort care today.

## 2020-05-09 NOTE — CHART NOTE - NSCHARTNOTEFT_GEN_A_CORE
After further discussions between Son Claudio and other family members they have decided to pursue comfort measures only at this time. I assured them that we will make their father comfortable as he begins his dying process. I was able to facilitate a facetime video for the patient's son Devendra at their request and the family has stated that no one else will be coming for end of life visiting. Appropriate medications have been ordered and others have been discontinued. After further discussions between Son Claudio and other family members they have decided to pursue comfort measures only at this time. I assured them that we will make their father comfortable as he begins his dying process. I was able to facilitate a facetime video for the patient's son Devendra at their request and the family has stated that no one else will be coming for end of life visiting. Appropriate medications have been ordered and others have been discontinued. ICU attending aware.

## 2020-05-09 NOTE — PROGRESS NOTE ADULT - SUBJECTIVE AND OBJECTIVE BOX
24h Events: Worsening renal function, drop in HGB to 6.6 requiring transfusion, Neuro consult for persistent R sided weakness    ICU Vital Signs Last 24 Hrs  T(C): 37.3 (08 May 2020 23:26), Max: 37.3 (08 May 2020 23:26)  T(F): 99.2 (08 May 2020 23:26), Max: 99.2 (08 May 2020 23:26)  HR: 60 (09 May 2020 00:08) (60 - 87)  BP: --  BP(mean): --  ABP: 97/50 (09 May 2020 00:08) (93/47 - 168/64)  ABP(mean): 64 (09 May 2020 00:08) (61 - 95)  RR: 24 (09 May 2020 00:08) (23 - 25)  SpO2: 98% (09 May 2020 00:08) (88% - 99%)      I&O's Detail    07 May 2020 07:01  -  08 May 2020 07:00  --------------------------------------------------------  IN:    dexmedetomidine Infusion: 276.1 mL    Free Water: 200 mL    Glucerna 1.5: 572 mL    HYDROmorphone  Infusion: 10 mL    Solution: 100 mL    Solution: 300 mL  Total IN: 1458.1 mL    OUT:    Chest Tube: 435 mL    Indwelling Catheter - Urethral: 935 mL    Voided: 300 mL  Total OUT: 1670 mL    Total NET: -211.9 mL      08 May 2020 07:01  -  09 May 2020 02:19  --------------------------------------------------------  IN:    dexmedetomidine Infusion: 198.1 mL    Glucerna 1.5: 364 mL    HYDROmorphone  Infusion: 7 mL    Solution: 100 mL  Total IN: 669.1 mL    OUT:    Indwelling Catheter - Urethral: 655 mL  Total OUT: 655 mL    Total NET: 14.1 mL          ABG - ( 08 May 2020 03:59 )  pH, Arterial: 7.34  pH, Blood: x     /  pCO2: 56    /  pO2: 94    / HCO3: 28    / Base Excess: 3.6   /  SaO2: 98                  MEDICATIONS  (STANDING):  ALBUTerol    90 MICROgram(s) HFA Inhaler 6 Puff(s) Inhalation every 6 hours  aMIOdarone    Tablet 200 milliGRAM(s) Oral daily  amLODIPine   Tablet 5 milliGRAM(s) Oral daily  aspirin  chewable 81 milliGRAM(s) Oral daily  chlorhexidine 0.12% Liquid 15 milliLiter(s) Oral Mucosa two times a day  chlorhexidine 2% Cloths 1 Application(s) Topical daily  dexMEDEtomidine Infusion 0.3 MICROgram(s)/kG/Hr (9.42 mL/Hr) IV Continuous <Continuous>  dextrose 5%. 1000 milliLiter(s) (50 mL/Hr) IV Continuous <Continuous>  dextrose 50% Injectable 12.5 Gram(s) IV Push once  dextrose 50% Injectable 25 Gram(s) IV Push once  dextrose 50% Injectable 25 Gram(s) IV Push once  heparin   Injectable 7500 Unit(s) SubCutaneous every 8 hours  HYDROmorphone Infusion 1 mG/Hr (1 mL/Hr) IV Continuous <Continuous>  insulin glargine Injectable (LANTUS) 60 Unit(s) SubCutaneous at bedtime  insulin glargine Injectable (LANTUS) 60 Unit(s) SubCutaneous every morning  insulin lispro (HumaLOG) corrective regimen sliding scale   SubCutaneous every 6 hours  linezolid  IVPB 600 milliGRAM(s) IV Intermittent every 12 hours  linezolid  IVPB      pantoprazole   Suspension 40 milliGRAM(s) Oral daily  piperacillin/tazobactam IVPB.. 3.375 Gram(s) IV Intermittent every 8 hours  senna Syrup 10 milliLiter(s) Oral daily    MEDICATIONS  (PRN):  acetaminophen   Tablet .. 650 milliGRAM(s) Oral every 6 hours PRN Temp greater or equal to 38C (100.4F), Mild Pain (1 - 3)  dextrose 40% Gel 15 Gram(s) Oral once PRN Blood Glucose LESS THAN 70 milliGRAM(s)/deciliter  glucagon  Injectable 1 milliGRAM(s) IntraMuscular once PRN Glucose LESS THAN 70 milligrams/deciliter  hydrALAZINE Injectable 10 milliGRAM(s) IV Push every 4 hours PRN SBP>180  HYDROmorphone  Injectable 1 milliGRAM(s) IV Push every 3 hours PRN Moderate Pain (4 - 6)  sodium chloride 0.9% lock flush 10 milliLiter(s) IV Push every 1 hour PRN Pre/post blood products, medications, blood draw, and to maintain line patency        Physical Exam:    Neurological: Dilaudid/Precedex gtt for sedation / analgesia    Pulmonary: mechanical ventilation; AC 24/450/14/100%, L chest tube with serous drainage    Cardiovascular: S1, S2, regular rate & rhythm    Gastrointestinal: soft, non-distended, exam limited by sedation    : brown in place draining yellow urine    Skin: warm, dry, no diaphoresis, no pallor, cyanosis, or jaundice    LABS:  CBC Full  -  ( 08 May 2020 04:38 )  WBC Count : 10.20 K/uL  RBC Count : 2.15 M/uL  Hemoglobin : 6.6 g/dL  Hematocrit : 21.8 %  Platelet Count - Automated : 122 K/uL  Mean Cell Volume : 101.4 fl  Mean Cell Hemoglobin : 30.7 pg  Mean Cell Hemoglobin Concentration : 30.3 gm/dL  Auto Neutrophil # : 8.72 K/uL  Auto Lymphocyte # : 0.66 K/uL  Auto Monocyte # : 0.64 K/uL  Auto Eosinophil # : 0.01 K/uL  Auto Basophil # : 0.02 K/uL  Auto Neutrophil % : 85.4 %  Auto Lymphocyte % : 6.5 %  Auto Monocyte % : 6.3 %  Auto Eosinophil % : 0.1 %  Auto Basophil % : 0.2 %    05-08    134<L>  |  98  |  44.0<H>  ----------------------------<  143<H>  5.3   |  26.0  |  1.38<H>    Ca    7.8<L>      08 May 2020 04:38  Phos  4.3     05-08  Mg     2.4     05-08    TPro  5.4<L>  /  Alb  2.3<L>  /  TBili  0.3<L>  /  DBili  x   /  AST  19  /  ALT  19  /  AlkPhos  51  05-08    PTT - ( 07 May 2020 04:31 )  PTT:26.4 sec    RECENT CULTURES:  05-07 .Sputum Sputum XXXX   Few polymorphonuclear leukocytes per low power field  Rare Squamous epithelial cells per low power field  Rare Yeast like cells per oil power field   Moderate Staphylococcus aureus    05-04 .Blood Blood-Venous XXXX XXXX   No growth at 48 hours        LIVER FUNCTIONS - ( 08 May 2020 04:38 )  Alb: 2.3 g/dL / Pro: 5.4 g/dL / ALK PHOS: 51 U/L / ALT: 19 U/L / AST: 19 U/L / GGT: x                   ASSESSMENT/PLAN:  74M DM2, HTN and sleep apnea (noncompliant with CPAP and stated that has not used cpap in years ) who was brought [facility] ED after being found in the bathroom covered in faeces by his friend, found to be hypoxic in ED that initially improved on nasal canula however needed to be intubated on HD#2    Neuro: sedation / analgesia with Dilaudid/Precedex gtt , delirium precautions, daily sedation holiday    CV: continue invasive monitoring with arterial line    Pulm: wean FiO2 / PEEP as tolerated, monitor chest tube output    GI/Nutrition: NPO with tube feeds     /Renal: brown for strict I&O, kidney fxn rising slowly, lytes stable, continue to monitor    ID: zyvox /zosyn Day#3, sputum with Staph Aureus, await sensitivity, afebrile, no leukocytosis    Endo: monitor blood glucose, SSC    Skin: repositioning for DTI prevention while in bed    Heme/DVT Prophylaxis: Hep SQ

## 2020-05-09 NOTE — DISCHARGE NOTE FOR THE EXPIRED PATIENT - HOSPITAL COURSE
This was a 74M that was admitted with COVID pneumonia on 20, his hypoxic respiratory failure progressed and he was intubated. His course was complicated by fungemia and multiple bacterial pneumonias. There were several times through his admission that he made positive strides towards extubation and/or tracheostomy. However with in the last several days he developed new severe hypoxemia that did not respond to antibiotic therapy, lateralizing neurologic deficits to the right upper extremity, new acute kidney injury and anemia. After several discussions with the patient's family we determined that the aggressive medical therapies that we were providing were not congruent with his prehospital wishes, he would not want to be dependant on machines and would not accept placement in a long-term care facility. We agreed that comfort care and symptom management would be appropriate. He  shortly after ventilatory support was weaned. ICU attending aware, next of kin aware, OPO not called due to COVID-19 +.

## 2020-05-11 LAB
AT III ACT/NOR PPP CHRO: 87 % — SIGNIFICANT CHANGE UP (ref 85–135)
B2 GLYCOPROT1 AB SER QL: NEGATIVE — SIGNIFICANT CHANGE UP
CARDIOLIPIN AB SER-ACNC: POSITIVE
DRVVT SCREEN TO CONFIRM RATIO: SIGNIFICANT CHANGE UP
LA NT DPL PPP QL: 39.7 SEC — SIGNIFICANT CHANGE UP
PROT C ACT/NOR PPP: 98 % — SIGNIFICANT CHANGE UP (ref 74–150)

## 2020-05-12 LAB
APCR PPP: 2.62 RATIO — SIGNIFICANT CHANGE UP
CARDIOLIPIN IGM SER-MCNC: 8.2 GPL — SIGNIFICANT CHANGE UP (ref 0–12.5)
CARDIOLIPIN IGM SER-MCNC: 8.6 MPL — SIGNIFICANT CHANGE UP (ref 0–12.5)
DEPRECATED CARDIOLIPIN IGA SER: <5 APL — SIGNIFICANT CHANGE UP (ref 0–12.5)

## 2020-05-15 LAB — PROT S FREE PPP-ACNC: 60 % NORMAL — LOW (ref 70–150)

## 2020-06-02 PROCEDURE — 93005 ELECTROCARDIOGRAM TRACING: CPT

## 2020-06-02 PROCEDURE — 85027 COMPLETE CBC AUTOMATED: CPT

## 2020-06-02 PROCEDURE — 85613 RUSSELL VIPER VENOM DILUTED: CPT

## 2020-06-02 PROCEDURE — 99285 EMERGENCY DEPT VISIT HI MDM: CPT | Mod: 25

## 2020-06-02 PROCEDURE — 80053 COMPREHEN METABOLIC PANEL: CPT

## 2020-06-02 PROCEDURE — 31500 INSERT EMERGENCY AIRWAY: CPT

## 2020-06-02 PROCEDURE — 86923 COMPATIBILITY TEST ELECTRIC: CPT

## 2020-06-02 PROCEDURE — 86146 BETA-2 GLYCOPROTEIN ANTIBODY: CPT

## 2020-06-02 PROCEDURE — 94640 AIRWAY INHALATION TREATMENT: CPT

## 2020-06-02 PROCEDURE — 83605 ASSAY OF LACTIC ACID: CPT

## 2020-06-02 PROCEDURE — 85300 ANTITHROMBIN III ACTIVITY: CPT

## 2020-06-02 PROCEDURE — 87186 SC STD MICRODIL/AGAR DIL: CPT

## 2020-06-02 PROCEDURE — 83615 LACTATE (LD) (LDH) ENZYME: CPT

## 2020-06-02 PROCEDURE — 96374 THER/PROPH/DIAG INJ IV PUSH: CPT | Mod: XU

## 2020-06-02 PROCEDURE — 85730 THROMBOPLASTIN TIME PARTIAL: CPT

## 2020-06-02 PROCEDURE — 80061 LIPID PANEL: CPT

## 2020-06-02 PROCEDURE — 84484 ASSAY OF TROPONIN QUANT: CPT

## 2020-06-02 PROCEDURE — 93306 TTE W/DOPPLER COMPLETE: CPT

## 2020-06-02 PROCEDURE — 85379 FIBRIN DEGRADATION QUANT: CPT

## 2020-06-02 PROCEDURE — 86140 C-REACTIVE PROTEIN: CPT

## 2020-06-02 PROCEDURE — 81001 URINALYSIS AUTO W/SCOPE: CPT

## 2020-06-02 PROCEDURE — 86900 BLOOD TYPING SEROLOGIC ABO: CPT

## 2020-06-02 PROCEDURE — 82962 GLUCOSE BLOOD TEST: CPT

## 2020-06-02 PROCEDURE — 82435 ASSAY OF BLOOD CHLORIDE: CPT

## 2020-06-02 PROCEDURE — 80202 ASSAY OF VANCOMYCIN: CPT

## 2020-06-02 PROCEDURE — 86901 BLOOD TYPING SEROLOGIC RH(D): CPT

## 2020-06-02 PROCEDURE — 85014 HEMATOCRIT: CPT

## 2020-06-02 PROCEDURE — 83036 HEMOGLOBIN GLYCOSYLATED A1C: CPT

## 2020-06-02 PROCEDURE — 94760 N-INVAS EAR/PLS OXIMETRY 1: CPT

## 2020-06-02 PROCEDURE — 36430 TRANSFUSION BLD/BLD COMPNT: CPT

## 2020-06-02 PROCEDURE — 84100 ASSAY OF PHOSPHORUS: CPT

## 2020-06-02 PROCEDURE — 86147 CARDIOLIPIN ANTIBODY EA IG: CPT

## 2020-06-02 PROCEDURE — 85384 FIBRINOGEN ACTIVITY: CPT

## 2020-06-02 PROCEDURE — 80048 BASIC METABOLIC PNL TOTAL CA: CPT

## 2020-06-02 PROCEDURE — 93880 EXTRACRANIAL BILAT STUDY: CPT

## 2020-06-02 PROCEDURE — 82550 ASSAY OF CK (CPK): CPT

## 2020-06-02 PROCEDURE — 36415 COLL VENOUS BLD VENIPUNCTURE: CPT

## 2020-06-02 PROCEDURE — 87150 DNA/RNA AMPLIFIED PROBE: CPT

## 2020-06-02 PROCEDURE — 83880 ASSAY OF NATRIURETIC PEPTIDE: CPT

## 2020-06-02 PROCEDURE — 94002 VENT MGMT INPAT INIT DAY: CPT

## 2020-06-02 PROCEDURE — 82330 ASSAY OF CALCIUM: CPT

## 2020-06-02 PROCEDURE — 36555 INSERT NON-TUNNEL CV CATH: CPT

## 2020-06-02 PROCEDURE — 87070 CULTURE OTHR SPECIMN AEROBIC: CPT

## 2020-06-02 PROCEDURE — 84132 ASSAY OF SERUM POTASSIUM: CPT

## 2020-06-02 PROCEDURE — 82803 BLOOD GASES ANY COMBINATION: CPT

## 2020-06-02 PROCEDURE — 87040 BLOOD CULTURE FOR BACTERIA: CPT

## 2020-06-02 PROCEDURE — 84295 ASSAY OF SERUM SODIUM: CPT

## 2020-06-02 PROCEDURE — P9016: CPT

## 2020-06-02 PROCEDURE — 85610 PROTHROMBIN TIME: CPT

## 2020-06-02 PROCEDURE — 82947 ASSAY GLUCOSE BLOOD QUANT: CPT

## 2020-06-02 PROCEDURE — 70450 CT HEAD/BRAIN W/O DYE: CPT

## 2020-06-02 PROCEDURE — 83090 ASSAY OF HOMOCYSTEINE: CPT

## 2020-06-02 PROCEDURE — 74176 CT ABD & PELVIS W/O CONTRAST: CPT

## 2020-06-02 PROCEDURE — 94003 VENT MGMT INPAT SUBQ DAY: CPT

## 2020-06-02 PROCEDURE — 87635 SARS-COV-2 COVID-19 AMP PRB: CPT

## 2020-06-02 PROCEDURE — 93308 TTE F-UP OR LMTD: CPT

## 2020-06-02 PROCEDURE — 82553 CREATINE MB FRACTION: CPT

## 2020-06-02 PROCEDURE — 84443 ASSAY THYROID STIM HORMONE: CPT

## 2020-06-02 PROCEDURE — 84145 PROCALCITONIN (PCT): CPT

## 2020-06-02 PROCEDURE — 82728 ASSAY OF FERRITIN: CPT

## 2020-06-02 PROCEDURE — 85306 CLOT INHIBIT PROT S FREE: CPT

## 2020-06-02 PROCEDURE — 85303 CLOT INHIBIT PROT C ACTIVITY: CPT

## 2020-06-02 PROCEDURE — 85307 ASSAY ACTIVATED PROTEIN C: CPT

## 2020-06-02 PROCEDURE — 83735 ASSAY OF MAGNESIUM: CPT

## 2020-06-02 PROCEDURE — C1751: CPT

## 2020-06-02 PROCEDURE — 71045 X-RAY EXAM CHEST 1 VIEW: CPT

## 2020-06-02 PROCEDURE — 86850 RBC ANTIBODY SCREEN: CPT

## 2020-06-02 PROCEDURE — 71250 CT THORAX DX C-: CPT

## 2020-06-02 PROCEDURE — 85652 RBC SED RATE AUTOMATED: CPT

## 2020-06-02 PROCEDURE — 84478 ASSAY OF TRIGLYCERIDES: CPT
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
## 2021-07-23 NOTE — CHART NOTE - NSCHARTNOTEFT_GEN_A_CORE
MARIA ESTHER Guardado spoke with patient's family earlier today regarding current status - all questions answered. Patient notified of message above from Dr. Mijares.  Also called [facility] for referral. Talked to Naye [phone]. Records to be faxed to [phone] per medical records.

## 2021-12-30 NOTE — ED PROCEDURE NOTE - EBL
Bedside and Verbal shift change report given to [address] [place] [address] [address]  (oncoming nurse) by Ellen Metcalf RN  (offgoing nurse). Report included the following information SBAR. minimal

## 2022-11-09 NOTE — ED PROVIDER NOTE - NS ED MD DISPO SPECIAL CONSIDERATION1
Detail Level: Detailed Detail Level: Zone Possible COVID-19/CPAP/Fall Precaution/Continuous Pulse Oximetry/Geriatrics/Frailty

## 2023-01-30 NOTE — PROVIDER CONTACT NOTE (CRITICAL VALUE NOTIFICATION) - DATE AND TIME:
26-Apr-2020 13:29 26-Apr-2020 13:43 Mid-Level Procedure Text (A): After obtaining clear surgical margins the patient was sent to a mid-level provider for surgical repair.  The patient understands they will receive post-surgical care and follow-up from the mid-level provider.

## 2023-02-03 NOTE — ED ADULT NURSE NOTE - NSFALLRSKASSESSTYPE_ED_ALL_ED
[address]  [address]      WHAUQ:[phone]     [] Certification  [] Recertification     []  Plan of Care  [] Progress Note [x] Discharge                            Referring Provider: Karyle Quest, MD            From:  Kiara Owens, PT     Patient: Edilia Pizarro (48 y.o. male) : 1968 Date: 02/3/2023   Medical Diagnosis: Strain of unspecified muscle, fascia and tendon at shoulder and upper arm level, right arm, initial encounter [B66.656F]       Treatment Diagnosis: R Cervical/scapular complex pain with radicular symptoms along the ulnar pathway. Progress Report Period from:  2023  to 2023     Visits to Date: 4 No Show: 0 Cancelled Appts: 0     OBJECTIVE:   Short Term Goals - Time Frame for Short Term Goals: 2 weeks    Goals Current/Discharge status  Status   Short Term Goal 1: The pt will demonstrate improved postural awareness requiring <25% VC's with exercises  General Observations  Description: Improved cervical alignment with neutral posturing. STG Goal 1 Status[de-identified] Met   Short Term Goal 2: Decrease Cervical/R UE radicular pain 50% to assist with improved functional gains/writing  Pain Screening  Patient Currently in Pain: Yes  Pain Assessment: 0-10  Pain Level: 1    Pain Location:  Neck  Pain Orientation: Right  Pain Radiating Towards: Pain Descriptors: sore STG Goal 2 Status[de-identified] MET   Long Term Goals - Time Frame for Long Term Goals : 4-6 weeks  Goals Current/ Discharge status Met   Long Term Goal 1: Indep HEP for symptom management HEP initiated  for symptom management. Progression provided for comprehensive program development.  LTG Goal 1 Status[de-identified] Met   Long Term Goal 2: Pt demo improved overall function by reporting greater than 75% per functional survey score 4/50= 92% functional       LTG Goal 2 Status[de-identified] met   Long Term Goal 3: Pain-free Cervical/scapular AROM to WNL allowing an increase in ADL tolerance. AROM Cervical Spine   Cervical spine general AROM: Cervical Flex 40, Ext 50, SB B 40 deg, Rotation 50 B   LTG Goal 3 Status[de-identified] Met   Long Term Goal 4: Improve scapular/shoulder strength 4+/5 to allow patient to return to overhead reach and lift. L UE Strength Comment: 4+/5 throughout     R UE Strength Comment: 4+/5 throughout    LTG Goal 4 Status[de-identified] New      Body Structures, Functions, Activity Limitations Requiring Skilled Therapeutic Intervention: Decreased ROM, Decreased strength, Decreased tolerance to work activity, Decreased posture, Increased pain  Assessment: Pt has met all goals for shoulder and benefited from skilled PT. Pt exhibits weakness in hand with decrease  and further testing may be required. Therapy Prognosis: Good      PLAN: [x] DC PT with HEP  Frequency/Duration:                                        Patient Status:[] Continue/ Initiate plan of Care                           [x] Discharge PT. Recommend pt continue with HEP. [] Additional visits requested, Please re-certify for additional visits:                                                    Signature: Electronically signed by Juana Carlos PT on 2/3/2023 at 4:29 PM          If you have any questions or concerns, please don't hesitate to call.   Thank you for your referral. Initial (On Arrival)

## 2023-09-12 NOTE — H&P ADULT - PROBLEM SELECTOR PROBLEM 6
Detailed message left for pt on self-identified voicemail regarding response from coding. Told to call clinic back if questions or concerns.        Essential hypertension Type 2 diabetes mellitus with hyperglycemia, with long-term current use of insulin

## 2023-10-13 NOTE — PROCEDURE NOTE - NSCHLORHEXIDINEBATH_GEN_A_CORE
2% wipes/To be discontinued when line removed

## 2024-04-05 NOTE — ED ADULT TRIAGE NOTE - NS ED NURSE DIRECT TO ROOM YN
Yes
